# Patient Record
Sex: FEMALE | Race: WHITE | Employment: OTHER | URBAN - METROPOLITAN AREA
[De-identification: names, ages, dates, MRNs, and addresses within clinical notes are randomized per-mention and may not be internally consistent; named-entity substitution may affect disease eponyms.]

---

## 2021-08-30 ENCOUNTER — APPOINTMENT (OUTPATIENT)
Dept: GENERAL RADIOLOGY | Age: 81
End: 2021-08-30
Attending: EMERGENCY MEDICINE
Payer: MEDICARE

## 2021-08-30 ENCOUNTER — HOSPITAL ENCOUNTER (EMERGENCY)
Age: 81
Discharge: HOME | End: 2021-08-30
Attending: EMERGENCY MEDICINE
Payer: MEDICARE

## 2021-08-30 VITALS
TEMPERATURE: 98.3 F | HEIGHT: 63 IN | OXYGEN SATURATION: 94 % | DIASTOLIC BLOOD PRESSURE: 64 MMHG | RESPIRATION RATE: 18 BRPM | WEIGHT: 250 LBS | SYSTOLIC BLOOD PRESSURE: 145 MMHG | BODY MASS INDEX: 44.3 KG/M2 | HEART RATE: 70 BPM

## 2021-08-30 DIAGNOSIS — U07.1 COVID-19: Primary | ICD-10-CM

## 2021-08-30 LAB
ALBUMIN SERPL-MCNC: 3.3 G/DL (ref 3.2–4.6)
ALBUMIN/GLOB SERPL: 1 {RATIO} (ref 1.2–3.5)
ALP SERPL-CCNC: 132 U/L (ref 50–136)
ALT SERPL-CCNC: 21 U/L (ref 12–65)
ANION GAP SERPL CALC-SCNC: 6 MMOL/L (ref 7–16)
AST SERPL-CCNC: 15 U/L (ref 15–37)
BASOPHILS # BLD: 0 K/UL (ref 0–0.2)
BASOPHILS NFR BLD: 1 % (ref 0–2)
BILIRUB SERPL-MCNC: 0.2 MG/DL (ref 0.2–1.1)
BUN SERPL-MCNC: 17 MG/DL (ref 8–23)
CALCIUM SERPL-MCNC: 8.8 MG/DL (ref 8.3–10.4)
CHLORIDE SERPL-SCNC: 109 MMOL/L (ref 98–107)
CO2 SERPL-SCNC: 27 MMOL/L (ref 21–32)
CREAT SERPL-MCNC: 0.65 MG/DL (ref 0.6–1)
DIFFERENTIAL METHOD BLD: ABNORMAL
EOSINOPHIL # BLD: 0.3 K/UL (ref 0–0.8)
EOSINOPHIL NFR BLD: 5 % (ref 0.5–7.8)
ERYTHROCYTE [DISTWIDTH] IN BLOOD BY AUTOMATED COUNT: 15.9 % (ref 11.9–14.6)
GLOBULIN SER CALC-MCNC: 3.3 G/DL (ref 2.3–3.5)
GLUCOSE SERPL-MCNC: 131 MG/DL (ref 65–100)
HCT VFR BLD AUTO: 35.2 % (ref 35.8–46.3)
HGB BLD-MCNC: 10.8 G/DL (ref 11.7–15.4)
IMM GRANULOCYTES # BLD AUTO: 0 K/UL (ref 0–0.5)
IMM GRANULOCYTES NFR BLD AUTO: 0 % (ref 0–5)
LYMPHOCYTES # BLD: 1.6 K/UL (ref 0.5–4.6)
LYMPHOCYTES NFR BLD: 30 % (ref 13–44)
MCH RBC QN AUTO: 25 PG (ref 26.1–32.9)
MCHC RBC AUTO-ENTMCNC: 30.7 G/DL (ref 31.4–35)
MCV RBC AUTO: 81.5 FL (ref 79.6–97.8)
MONOCYTES # BLD: 0.6 K/UL (ref 0.1–1.3)
MONOCYTES NFR BLD: 11 % (ref 4–12)
NEUTS SEG # BLD: 2.8 K/UL (ref 1.7–8.2)
NEUTS SEG NFR BLD: 52 % (ref 43–78)
NRBC # BLD: 0 K/UL (ref 0–0.2)
PLATELET # BLD AUTO: 233 K/UL (ref 150–450)
PMV BLD AUTO: 11.2 FL (ref 9.4–12.3)
POTASSIUM SERPL-SCNC: 3.4 MMOL/L (ref 3.5–5.1)
PROT SERPL-MCNC: 6.6 G/DL (ref 6.3–8.2)
RBC # BLD AUTO: 4.32 M/UL (ref 4.05–5.2)
SODIUM SERPL-SCNC: 142 MMOL/L (ref 136–145)
WBC # BLD AUTO: 5.3 K/UL (ref 4.3–11.1)

## 2021-08-30 PROCEDURE — 74011000636 HC RX REV CODE- 636: Performed by: EMERGENCY MEDICINE

## 2021-08-30 PROCEDURE — 74011250636 HC RX REV CODE- 250/636: Performed by: EMERGENCY MEDICINE

## 2021-08-30 PROCEDURE — 80053 COMPREHEN METABOLIC PANEL: CPT

## 2021-08-30 PROCEDURE — 99283 EMERGENCY DEPT VISIT LOW MDM: CPT

## 2021-08-30 PROCEDURE — 74011000258 HC RX REV CODE- 258: Performed by: EMERGENCY MEDICINE

## 2021-08-30 PROCEDURE — 85025 COMPLETE CBC W/AUTO DIFF WBC: CPT

## 2021-08-30 PROCEDURE — 71046 X-RAY EXAM CHEST 2 VIEWS: CPT

## 2021-08-30 PROCEDURE — M0243 CASIRIVI AND IMDEVI INFUSION: HCPCS

## 2021-08-30 PROCEDURE — 96374 THER/PROPH/DIAG INJ IV PUSH: CPT

## 2021-08-30 RX ORDER — DEXAMETHASONE SODIUM PHOSPHATE 100 MG/10ML
10 INJECTION INTRAMUSCULAR; INTRAVENOUS
Status: COMPLETED | OUTPATIENT
Start: 2021-08-30 | End: 2021-08-30

## 2021-08-30 RX ADMIN — DEXAMETHASONE SODIUM PHOSPHATE 10 MG: 10 INJECTION INTRAMUSCULAR; INTRAVENOUS at 19:19

## 2021-08-30 RX ADMIN — CASIRIVIMAB AND IMDEVIMAB: 600; 600 INJECTION, SOLUTION, CONCENTRATE INTRAVENOUS at 17:26

## 2021-08-30 NOTE — ED NOTES
I have reviewed discharge instructions with the patient. The patient verbalized understanding. Patient left ED via Discharge Method: ambulatory to Home with self. Opportunity for questions and clarification provided. Patient given 0 scripts. To continue your aftercare when you leave the hospital, you may receive an automated call from our care team to check in on how you are doing. This is a free service and part of our promise to provide the best care and service to meet your aftercare needs.  If you have questions, or wish to unsubscribe from this service please call [phone]. Thank you for Choosing our [facility] Emergency Department.

## 2021-08-30 NOTE — ED TRIAGE NOTES
Pt arrives via walker to triage. Pt reports she is covid positive. Pt is here to receive monoclonal antibodies. Pt reports she is vaccinated, moderna. NAD. Masked.

## 2021-11-26 NOTE — ED PROVIDER NOTES
Mask was worn during the entire patient examination. Efren Sy is a 80 y.o. female who presents to the ED with a chief complaint of Covid positive. She tested this morning. She did receive both doses of the Moderna vaccine. Patient states she just started feeling bad and has had some cough and fatigue. She saw her doctor this morning who wanted her to get monoclonal antibodies given her age I feel this is appropriate as well. Patient looks well otherwise states she still hydrating and has had no fever or chills. She does have a history of COPD. No past medical history on file. No past surgical history on file. No family history on file. Social History     Socioeconomic History    Marital status:      Spouse name: Not on file    Number of children: Not on file    Years of education: Not on file    Highest education level: Not on file   Occupational History    Not on file   Tobacco Use    Smoking status: Not on file   Substance and Sexual Activity    Alcohol use: Not on file    Drug use: Not on file    Sexual activity: Not on file   Other Topics Concern    Not on file   Social History Narrative    Not on file     Social Determinants of Health     Financial Resource Strain:     Difficulty of Paying Living Expenses:    Food Insecurity:     Worried About Running Out of Food in the Last Year:     [address] in the Last Year:    Transportation Needs:     Lack of Transportation (Medical):      Lack of Transportation (Non-Medical):    Physical Activity:     Days of Exercise per Week:     Minutes of Exercise per Session:    Stress:     Feeling of Stress :    Social Connections:     Frequency of Communication with Friends and Family:     Frequency of Social Gatherings with Friends and Family:     Attends Yazdanism Services:     Active Member of Clubs or Organizations:     Attends Club or Organization Meetings:     Marital Status:    Intimate Partner Violence:     Fear of Current or Ex-Partner:     Emotionally Abused:     Physically Abused:     Sexually Abused: ALLERGIES: Benzalkonium    Review of Systems   Constitutional: Positive for fatigue. Negative for activity change, chills and diaphoresis. Respiratory: Positive for cough. Negative for apnea, chest tightness, shortness of breath, wheezing and stridor. Cardiovascular: Negative for chest pain and palpitations. Gastrointestinal: Negative for abdominal distention, abdominal pain, diarrhea, nausea and vomiting. Musculoskeletal: Negative for arthralgias, neck pain and neck stiffness. Skin: Negative for color change, pallor and rash. All other systems reviewed and are negative. Vitals:    08/30/21 1402   BP: (!) 162/61   Pulse: 63   Resp: 16   Temp: 98.3 °F (36.8 °C)   SpO2: 92%   Weight: 113.4 kg (250 lb)   Height: 5' 3\" (1.6 m)            Physical Exam  Vitals and nursing note reviewed. Constitutional:       General: She is not in acute distress. Appearance: Normal appearance. She is well-developed. She is not ill-appearing, toxic-appearing or diaphoretic. HENT:      Head: Normocephalic and atraumatic. Nose: Congestion present. Eyes:      General: No scleral icterus. Conjunctiva/sclera: Conjunctivae normal.   Cardiovascular:      Rate and Rhythm: Normal rate and regular rhythm. Pulmonary:      Effort: Pulmonary effort is normal. No respiratory distress. Breath sounds: No stridor. No wheezing, rhonchi or rales. Chest:      Chest wall: No tenderness. Abdominal:      General: Abdomen is flat. Tenderness: There is no abdominal tenderness. There is no guarding or rebound. Hernia: No hernia is present. Musculoskeletal:         General: No swelling, tenderness, deformity or signs of injury. Normal range of motion. Cervical back: Normal range of motion. No rigidity or tenderness. Skin:     General: Skin is warm.       Capillary Refill: Capillary refill takes less than 2 seconds. Coloration: Skin is not jaundiced or pale. Findings: No bruising, erythema or rash. Neurological:      General: No focal deficit present. Mental Status: She is alert and oriented to person, place, and time. Mental status is at baseline. Psychiatric:         Mood and Affect: Mood normal.         Behavior: Behavior normal.         Thought Content: Thought content normal.          MDM  Number of Diagnoses or Management Options  Diagnosis management comments: Patient has normal vitals her chest x-ray is normal labs appear okay she does have positive COVID-19 test from this morning and is 80years old. So we will give her the Regeneron thankfully she is vaccinated. I spoke with her and daughter on the phone and she is comfortable with plan we will teach her how to monitor her home oxygen level with pulse oximetry. Fatmata Martinez MD; 8/30/2021 @4:29 PM Voice dictation software was used during the making of this note. This software is not perfect and grammatical and other typographical errors may be present.   This note has not been proofread for errors.  ===================================================================          Amount and/or Complexity of Data Reviewed  Clinical lab tests: ordered and reviewed (Results for orders placed or performed during the hospital encounter of 08/30/21  -CBC WITH AUTOMATED DIFF       Result                      Value             Ref Range           WBC                         5.3               4.3 - 11.1 K*       RBC                         4.32              4.05 - 5.2 M*       HGB                         10.8 (L)          11.7 - 15.4 *       HCT                         35.2 (L)          35.8 - 46.3 %       MCV                         81.5              79.6 - 97.8 *       MCH                         25.0 (L)          26.1 - 32.9 *       MCHC                        30.7 (L)          31.4 - 35.0 *       RDW 15.9 (H)          11.9 - 14.6 %       PLATELET                    233               150 - 450 K/*       MPV                         11.2              9.4 - 12.3 FL       ABSOLUTE NRBC               0.00              0.0 - 0.2 K/*       DF                          AUTOMATED                             NEUTROPHILS                 52                43 - 78 %           LYMPHOCYTES                 30                13 - 44 %           MONOCYTES                   11                4.0 - 12.0 %        EOSINOPHILS                 5                 0.5 - 7.8 %         BASOPHILS                   1                 0.0 - 2.0 %         IMMATURE GRANULOCYTES       0                 0.0 - 5.0 %         ABS. NEUTROPHILS            2.8               1.7 - 8.2 K/*       ABS. LYMPHOCYTES            1.6               0.5 - 4.6 K/*       ABS. MONOCYTES              0.6               0.1 - 1.3 K/*       ABS. EOSINOPHILS            0.3               0.0 - 0.8 K/*       ABS. BASOPHILS              0.0               0.0 - 0.2 K/*       ABS. IMM.  GRANS.            0.0               0.0 - 0.5 K/*  -METABOLIC PANEL, COMPREHENSIVE       Result                      Value             Ref Range           Sodium                      142               136 - 145 mm*       Potassium                   3.4 (L)           3.5 - 5.1 mm*       Chloride                    109 (H)           98 - 107 mmo*       CO2                         27                21 - 32 mmol*       Anion gap                   6 (L)             7 - 16 mmol/L       Glucose                     131 (H)           65 - 100 mg/*       BUN                         17                8 - 23 MG/DL        Creatinine                  0.65              0.6 - 1.0 MG*       GFR est AA                  >60               >60 ml/min/1*       GFR est non-AA              >60               >60 ml/min/1*       Calcium                     8.8               8.3 - 10.4 M*       Bilirubin, total            0.2               0.2 - 1.1 MG*       ALT (SGPT)                  21                12 - 65 U/L         AST (SGOT)                  15                15 - 37 U/L         Alk. phosphatase            132               50 - 136 U/L        Protein, total              6.6               6.3 - 8.2 g/*       Albumin                     3.3               3.2 - 4.6 g/*       Globulin                    3.3               2.3 - 3.5 g/*       A-G Ratio                   1.0 (L)           1.2 - 3.5     )  Tests in the radiology section of CPT®: ordered and reviewed (XR CHEST PA LAT    Result Date: 8/30/2021  PA AND LATERAL CHEST X-RAY. Clinical Indication: Cough, shortness of breath, Covid 19 positive Comparison: No prior Findings: 2 views of the chest submitted demonstrate cardiomegaly. There is no pleural effusion. The lungs are expanded and clear. There is no pneumothorax. Degenerative arthrosis noted at the right shoulder.      Cardiomegaly, otherwise no acute abnormality.   )           Procedures normal...

## 2022-03-08 ENCOUNTER — APPOINTMENT (OUTPATIENT)
Dept: GENERAL RADIOLOGY | Age: 82
DRG: 291 | End: 2022-03-08
Attending: EMERGENCY MEDICINE
Payer: MEDICARE

## 2022-03-08 ENCOUNTER — HOSPITAL ENCOUNTER (INPATIENT)
Age: 82
LOS: 2 days | Discharge: HOME | DRG: 291 | End: 2022-03-10
Attending: EMERGENCY MEDICINE | Admitting: FAMILY MEDICINE
Payer: MEDICARE

## 2022-03-08 DIAGNOSIS — R53.83 FATIGUE, UNSPECIFIED TYPE: Primary | ICD-10-CM

## 2022-03-08 DIAGNOSIS — I50.32 CHRONIC DIASTOLIC CONGESTIVE HEART FAILURE (HCC): Chronic | ICD-10-CM

## 2022-03-08 DIAGNOSIS — N17.9 ACUTE KIDNEY INJURY (HCC): ICD-10-CM

## 2022-03-08 DIAGNOSIS — I10 ESSENTIAL HYPERTENSION: Chronic | ICD-10-CM

## 2022-03-08 DIAGNOSIS — N17.9 AKI (ACUTE KIDNEY INJURY) (HCC): ICD-10-CM

## 2022-03-08 DIAGNOSIS — R06.02 SOB (SHORTNESS OF BREATH): ICD-10-CM

## 2022-03-08 DIAGNOSIS — E87.6 HYPOKALEMIA: ICD-10-CM

## 2022-03-08 DIAGNOSIS — I89.0 LYMPHEDEMA OF BOTH LOWER EXTREMITIES: ICD-10-CM

## 2022-03-08 LAB
ALBUMIN SERPL-MCNC: 3.7 G/DL (ref 3.2–4.6)
ALBUMIN/GLOB SERPL: 0.9 {RATIO} (ref 1.2–3.5)
ALP SERPL-CCNC: 111 U/L (ref 50–130)
ALT SERPL-CCNC: 29 U/L (ref 12–65)
ANION GAP SERPL CALC-SCNC: 9 MMOL/L (ref 7–16)
AST SERPL-CCNC: 37 U/L (ref 15–37)
BASOPHILS # BLD: 0.1 K/UL (ref 0–0.2)
BASOPHILS NFR BLD: 1 % (ref 0–2)
BILIRUB SERPL-MCNC: 0.4 MG/DL (ref 0.2–1.1)
BNP SERPL-MCNC: 915 PG/ML
BUN SERPL-MCNC: 51 MG/DL (ref 8–23)
CALCIUM SERPL-MCNC: 10 MG/DL (ref 8.3–10.4)
CHLORIDE SERPL-SCNC: 91 MMOL/L (ref 98–107)
CO2 SERPL-SCNC: 34 MMOL/L (ref 21–32)
COVID-19 RAPID TEST, COVR: NOT DETECTED
CREAT SERPL-MCNC: 1.17 MG/DL (ref 0.6–1)
DIFFERENTIAL METHOD BLD: ABNORMAL
EOSINOPHIL # BLD: 0.7 K/UL (ref 0–0.8)
EOSINOPHIL NFR BLD: 8 % (ref 0.5–7.8)
ERYTHROCYTE [DISTWIDTH] IN BLOOD BY AUTOMATED COUNT: 17.3 % (ref 11.9–14.6)
GLOBULIN SER CALC-MCNC: 4.1 G/DL (ref 2.3–3.5)
GLUCOSE SERPL-MCNC: 132 MG/DL (ref 65–100)
HCT VFR BLD AUTO: 37.6 % (ref 35.8–46.3)
HGB BLD-MCNC: 11.7 G/DL (ref 11.7–15.4)
IMM GRANULOCYTES # BLD AUTO: 0 K/UL (ref 0–0.5)
IMM GRANULOCYTES NFR BLD AUTO: 0 % (ref 0–5)
LYMPHOCYTES # BLD: 2.9 K/UL (ref 0.5–4.6)
LYMPHOCYTES NFR BLD: 33 % (ref 13–44)
MAGNESIUM SERPL-MCNC: 2.7 MG/DL (ref 1.8–2.4)
MCH RBC QN AUTO: 24.2 PG (ref 26.1–32.9)
MCHC RBC AUTO-ENTMCNC: 31.1 G/DL (ref 31.4–35)
MCV RBC AUTO: 77.8 FL (ref 79.6–97.8)
MONOCYTES # BLD: 1.1 K/UL (ref 0.1–1.3)
MONOCYTES NFR BLD: 12 % (ref 4–12)
NEUTS SEG # BLD: 4 K/UL (ref 1.7–8.2)
NEUTS SEG NFR BLD: 46 % (ref 43–78)
NRBC # BLD: 0 K/UL (ref 0–0.2)
PLATELET # BLD AUTO: 383 K/UL (ref 150–450)
PMV BLD AUTO: 11 FL (ref 9.4–12.3)
POTASSIUM SERPL-SCNC: 2.8 MMOL/L (ref 3.5–5.1)
PROT SERPL-MCNC: 7.8 G/DL (ref 6.3–8.2)
RBC # BLD AUTO: 4.83 M/UL (ref 4.05–5.2)
SODIUM SERPL-SCNC: 134 MMOL/L (ref 136–145)
SOURCE, COVRS: NORMAL
WBC # BLD AUTO: 8.9 K/UL (ref 4.3–11.1)

## 2022-03-08 PROCEDURE — 74011250636 HC RX REV CODE- 250/636: Performed by: EMERGENCY MEDICINE

## 2022-03-08 PROCEDURE — 65270000029 HC RM PRIVATE

## 2022-03-08 PROCEDURE — 77010033678 HC OXYGEN DAILY

## 2022-03-08 PROCEDURE — 99285 EMERGENCY DEPT VISIT HI MDM: CPT

## 2022-03-08 PROCEDURE — 74011250637 HC RX REV CODE- 250/637: Performed by: EMERGENCY MEDICINE

## 2022-03-08 PROCEDURE — 96374 THER/PROPH/DIAG INJ IV PUSH: CPT

## 2022-03-08 PROCEDURE — 74011250637 HC RX REV CODE- 250/637: Performed by: FAMILY MEDICINE

## 2022-03-08 PROCEDURE — 85025 COMPLETE CBC W/AUTO DIFF WBC: CPT

## 2022-03-08 PROCEDURE — 94762 N-INVAS EAR/PLS OXIMTRY CONT: CPT

## 2022-03-08 PROCEDURE — 74011000250 HC RX REV CODE- 250: Performed by: EMERGENCY MEDICINE

## 2022-03-08 PROCEDURE — 80053 COMPREHEN METABOLIC PANEL: CPT

## 2022-03-08 PROCEDURE — 87635 SARS-COV-2 COVID-19 AMP PRB: CPT

## 2022-03-08 PROCEDURE — 74011250636 HC RX REV CODE- 250/636: Performed by: FAMILY MEDICINE

## 2022-03-08 PROCEDURE — 71045 X-RAY EXAM CHEST 1 VIEW: CPT

## 2022-03-08 PROCEDURE — 83735 ASSAY OF MAGNESIUM: CPT

## 2022-03-08 PROCEDURE — 93005 ELECTROCARDIOGRAM TRACING: CPT | Performed by: EMERGENCY MEDICINE

## 2022-03-08 PROCEDURE — 83880 ASSAY OF NATRIURETIC PEPTIDE: CPT

## 2022-03-08 RX ORDER — POLYETHYLENE GLYCOL 3350 17 G/17G
17 POWDER, FOR SOLUTION ORAL AS NEEDED
COMMUNITY

## 2022-03-08 RX ORDER — SERTRALINE HYDROCHLORIDE 50 MG/1
TABLET, FILM COATED ORAL AS NEEDED
COMMUNITY

## 2022-03-08 RX ORDER — RABEPRAZOLE SODIUM 20 MG/1
20 TABLET, DELAYED RELEASE ORAL DAILY
COMMUNITY

## 2022-03-08 RX ORDER — FLUTICASONE PROPIONATE 50 MCG
2 SPRAY, SUSPENSION (ML) NASAL AS NEEDED
COMMUNITY

## 2022-03-08 RX ORDER — HYDROCODONE BITARTRATE AND ACETAMINOPHEN 10; 325 MG/1; MG/1
1 TABLET ORAL
COMMUNITY
End: 2022-03-15

## 2022-03-08 RX ORDER — POLYETHYLENE GLYCOL 3350 17 G/17G
17 POWDER, FOR SOLUTION ORAL DAILY
Status: DISCONTINUED | OUTPATIENT
Start: 2022-03-09 | End: 2022-03-10 | Stop reason: HOSPADM

## 2022-03-08 RX ORDER — PRAVASTATIN SODIUM 40 MG/1
40 TABLET ORAL
COMMUNITY

## 2022-03-08 RX ORDER — ENOXAPARIN SODIUM 100 MG/ML
40 INJECTION SUBCUTANEOUS EVERY 12 HOURS
Status: DISCONTINUED | OUTPATIENT
Start: 2022-03-08 | End: 2022-03-10 | Stop reason: HOSPADM

## 2022-03-08 RX ORDER — GUAIFENESIN 100 MG/5ML
81 LIQUID (ML) ORAL DAILY
COMMUNITY

## 2022-03-08 RX ORDER — FUROSEMIDE 10 MG/ML
80 INJECTION INTRAMUSCULAR; INTRAVENOUS
Status: COMPLETED | OUTPATIENT
Start: 2022-03-08 | End: 2022-03-08

## 2022-03-08 RX ORDER — POTASSIUM CHLORIDE 20 MEQ/1
40 TABLET, EXTENDED RELEASE ORAL
Status: COMPLETED | OUTPATIENT
Start: 2022-03-08 | End: 2022-03-08

## 2022-03-08 RX ORDER — CELECOXIB 200 MG/1
CAPSULE ORAL 2 TIMES DAILY
COMMUNITY

## 2022-03-08 RX ORDER — ONDANSETRON 2 MG/ML
4 INJECTION INTRAMUSCULAR; INTRAVENOUS
Status: DISCONTINUED | OUTPATIENT
Start: 2022-03-08 | End: 2022-03-10 | Stop reason: HOSPADM

## 2022-03-08 RX ORDER — AMLODIPINE BESYLATE AND ATORVASTATIN CALCIUM 10; 10 MG/1; MG/1
1 TABLET, FILM COATED ORAL DAILY
COMMUNITY
End: 2022-03-10

## 2022-03-08 RX ORDER — SODIUM CHLORIDE 0.9 % (FLUSH) 0.9 %
5-10 SYRINGE (ML) INJECTION EVERY 8 HOURS
Status: DISCONTINUED | OUTPATIENT
Start: 2022-03-08 | End: 2022-03-10 | Stop reason: HOSPADM

## 2022-03-08 RX ORDER — GUAIFENESIN 600 MG/1
600 TABLET, EXTENDED RELEASE ORAL 2 TIMES DAILY
COMMUNITY

## 2022-03-08 RX ORDER — FLUTICASONE PROPIONATE AND SALMETEROL XINAFOATE 115; 21 UG/1; UG/1
2 AEROSOL, METERED RESPIRATORY (INHALATION) 2 TIMES DAILY
COMMUNITY

## 2022-03-08 RX ORDER — CALCIUM CARBONATE 200(500)MG
1 TABLET,CHEWABLE ORAL DAILY
COMMUNITY

## 2022-03-08 RX ORDER — FUROSEMIDE 40 MG/1
TABLET ORAL DAILY
COMMUNITY
End: 2022-03-10

## 2022-03-08 RX ORDER — GUAIFENESIN/DEXTROMETHORPHAN 100-10MG/5
5 SYRUP ORAL
COMMUNITY
End: 2022-03-15

## 2022-03-08 RX ORDER — TRIAMCINOLONE ACETONIDE 1 MG/G
OINTMENT TOPICAL AS NEEDED
COMMUNITY

## 2022-03-08 RX ORDER — SODIUM CHLORIDE 0.9 % (FLUSH) 0.9 %
5-10 SYRINGE (ML) INJECTION AS NEEDED
Status: DISCONTINUED | OUTPATIENT
Start: 2022-03-08 | End: 2022-03-10 | Stop reason: HOSPADM

## 2022-03-08 RX ORDER — ACETAMINOPHEN 325 MG/1
650 TABLET ORAL
Status: DISCONTINUED | OUTPATIENT
Start: 2022-03-08 | End: 2022-03-10 | Stop reason: HOSPADM

## 2022-03-08 RX ORDER — SODIUM CHLORIDE 0.9 % (FLUSH) 0.9 %
5-40 SYRINGE (ML) INJECTION AS NEEDED
Status: DISCONTINUED | OUTPATIENT
Start: 2022-03-08 | End: 2022-03-08

## 2022-03-08 RX ORDER — CLOTRIMAZOLE AND BETAMETHASONE DIPROPIONATE 10; .64 MG/G; MG/G
CREAM TOPICAL AS NEEDED
COMMUNITY

## 2022-03-08 RX ORDER — ALPRAZOLAM 0.5 MG/1
TABLET ORAL AS NEEDED
Status: ON HOLD | COMMUNITY
End: 2022-03-15 | Stop reason: SDUPTHER

## 2022-03-08 RX ORDER — POTASSIUM CHLORIDE 14.9 MG/ML
20 INJECTION INTRAVENOUS
Status: COMPLETED | OUTPATIENT
Start: 2022-03-08 | End: 2022-03-09

## 2022-03-08 RX ORDER — METAXALONE 800 MG/1
TABLET ORAL 2 TIMES DAILY
COMMUNITY

## 2022-03-08 RX ORDER — POTASSIUM CHLORIDE 20 MEQ/1
40 TABLET, EXTENDED RELEASE ORAL 2 TIMES DAILY
Status: COMPLETED | OUTPATIENT
Start: 2022-03-08 | End: 2022-03-10

## 2022-03-08 RX ORDER — ACETAMINOPHEN 650 MG/1
650 SUPPOSITORY RECTAL
Status: DISCONTINUED | OUTPATIENT
Start: 2022-03-08 | End: 2022-03-10 | Stop reason: HOSPADM

## 2022-03-08 RX ORDER — PROMETHAZINE HYDROCHLORIDE 25 MG/1
12.5 TABLET ORAL
Status: DISCONTINUED | OUTPATIENT
Start: 2022-03-08 | End: 2022-03-10 | Stop reason: HOSPADM

## 2022-03-08 RX ORDER — SODIUM CHLORIDE 0.9 % (FLUSH) 0.9 %
5-40 SYRINGE (ML) INJECTION EVERY 8 HOURS
Status: DISCONTINUED | OUTPATIENT
Start: 2022-03-08 | End: 2022-03-08

## 2022-03-08 RX ORDER — CALCIUM CARBONATE/VITAMIN D3 600 MG-125
TABLET ORAL
COMMUNITY

## 2022-03-08 RX ADMIN — POTASSIUM CHLORIDE 40 MEQ: 20 TABLET, EXTENDED RELEASE ORAL at 22:48

## 2022-03-08 RX ADMIN — POTASSIUM CHLORIDE 40 MEQ: 20 TABLET, EXTENDED RELEASE ORAL at 20:32

## 2022-03-08 RX ADMIN — ENOXAPARIN SODIUM 40 MG: 100 INJECTION SUBCUTANEOUS at 22:48

## 2022-03-08 RX ADMIN — POTASSIUM CHLORIDE 20 MEQ: 14.9 INJECTION, SOLUTION INTRAVENOUS at 22:48

## 2022-03-08 RX ADMIN — SODIUM CHLORIDE, PRESERVATIVE FREE 10 ML: 5 INJECTION INTRAVENOUS at 22:00

## 2022-03-08 RX ADMIN — FUROSEMIDE 80 MG: 10 INJECTION, SOLUTION INTRAVENOUS at 18:58

## 2022-03-08 NOTE — ED TRIAGE NOTES
Pt to ER with daughter and states pt has been having swelling in bilateral legs for about one week. States pt has been taking Lasix 80mg in the morning and 40 mg at night but still has the swelling. States pt has not been able to urinate much. States she has been been more weak and SOB for about one week also. Masked for triage.

## 2022-03-08 NOTE — ED PROVIDER NOTES
77-year-old female brought in by daughter. Review of records the patient has a history of reflux, congestive heart failure, hypertension. Patient complains of some troubles with fatigue some shortness of breath diffuse weakness and occasional leg cramping. Patient has a history congestive heart failure and takes 40 mg Lasix a day. This was recently graded as to 80 mg a day by her primary care doctor. She is to have troubles with edema and shortness of breath and last week, patient was added to 40 mg dose of Lasix at night as well. States going to bathroom frequently but little each time. No chest pain or fever chills. Occasional cough. She does have some orthopnea. Has oximeter at home and states O2 saturations been running between 8494% but usually average around 88%. Legs are more swollen compared to a week ago. The history is provided by the patient and a relative. Leg Swelling   This is a new problem. The current episode started more than 1 week ago. The problem has been gradually worsening. The patient is experiencing no pain. Pertinent negatives include no back pain and no neck pain. There has been no history of extremity trauma. No past medical history on file. No past surgical history on file. No family history on file.     Social History     Socioeconomic History    Marital status:      Spouse name: Not on file    Number of children: Not on file    Years of education: Not on file    Highest education level: Not on file   Occupational History    Not on file   Tobacco Use    Smoking status: Not on file    Smokeless tobacco: Not on file   Substance and Sexual Activity    Alcohol use: Not on file    Drug use: Not on file    Sexual activity: Not on file   Other Topics Concern    Not on file   Social History Narrative    Not on file     Social Determinants of Health     Financial Resource Strain:     Difficulty of Paying Living Expenses: Not on file   Food Insecurity:     Worried About Running Out of Food in the Last Year: Not on file    Edilberto of Food in the Last Year: Not on file   Transportation Needs:     Lack of Transportation (Medical): Not on file    Lack of Transportation (Non-Medical): Not on file   Physical Activity:     Days of Exercise per Week: Not on file    Minutes of Exercise per Session: Not on file   Stress:     Feeling of Stress : Not on file   Social Connections:     Frequency of Communication with Friends and Family: Not on file    Frequency of Social Gatherings with Friends and Family: Not on file    Attends Anabaptist Services: Not on file    Active Member of [address] [place] [address] [employer] or Organizations: Not on file    Attends Club or Organization Meetings: Not on file    Marital Status: Not on file   Intimate Partner Violence:     Fear of Current or Ex-Partner: Not on file    Emotionally Abused: Not on file    Physically Abused: Not on file    Sexually Abused: Not on file   Housing Stability:     Unable to Pay for Housing in the Last Year: Not on file    Number of [place] in the Last Year: Not on file    Unstable Housing in the Last Year: Not on file         ALLERGIES: Benzalkonium    Review of Systems   Constitutional: Positive for fatigue. Negative for chills, diaphoresis and fever. Respiratory: Negative for cough and shortness of breath. Cardiovascular: Positive for leg swelling. Negative for chest pain and palpitations. Gastrointestinal: Negative for abdominal pain, diarrhea and vomiting. Genitourinary: Negative for dysuria and flank pain. Musculoskeletal: Negative for back pain and neck pain. Skin: Negative for color change and rash. Neurological: Positive for weakness (Generalized). Negative for syncope, light-headedness and headaches. All other systems reviewed and are negative.       Vitals:    03/08/22 1822 03/08/22 1843   BP: 115/61    Pulse: 64    Resp: 20    Temp: 97.7 °F (36.5 °C)    SpO2: 90% 96%   Weight: 117.9 kg (260 lb)    Height: 5' 4\" (1.626 m)             Physical Exam  Vitals and nursing note reviewed. Constitutional:       General: She is not in acute distress. Appearance: She is well-developed. HENT:      Head: Normocephalic and atraumatic. Right Ear: External ear normal.      Left Ear: External ear normal.      Mouth/Throat:      Pharynx: No oropharyngeal exudate. Eyes:      General: No scleral icterus. Conjunctiva/sclera: Conjunctivae normal.      Pupils: Pupils are equal, round, and reactive to light. Cardiovascular:      Rate and Rhythm: Normal rate and regular rhythm. Heart sounds: No murmur heard. Pulmonary:      Effort: No respiratory distress. Breath sounds: Rales present. Abdominal:      Palpations: Abdomen is soft. Tenderness: There is no abdominal tenderness. There is no guarding. Musculoskeletal:         General: Swelling and tenderness (No erythema no focal tenderness.) present. Cervical back: Normal range of motion and neck supple. Right lower leg: Edema present. Left lower leg: Edema present. Skin:     General: Skin is warm and dry. Neurological:      Mental Status: She is alert and oriented to person, place, and time. Gait: Gait normal.      Comments: Nl speech   Psychiatric:         Speech: Speech normal.          MDM  Number of Diagnoses or Management Options  Acute kidney injury (HCC)  Fatigue, unspecified type  Hypokalemia  SOB (shortness of breath)  Diagnosis management comments: Increasing shortness of breath orthopnea and dyspnea on exertion in a patient with history of congestive failure. Chest x-ray to check for pneumonia, effusion or congestive failure. Screening lab work to check for worse renal function electrolyte abnormalities or anemia. Check EKG. IV Lasix.        Amount and/or Complexity of Data Reviewed  Clinical lab tests: ordered and reviewed  Tests in the radiology section of CPT®: ordered and reviewed  Tests in the medicine section of CPT®: ordered and reviewed  Review and summarize past medical records: yes  Independent visualization of images, tracings, or specimens: yes (My interpretation of EKG shows rhythm at 67. First-degree AV block. Nonspecific ST changes.   Normal QT interval.  No ectopy.)    Risk of Complications, Morbidity, and/or Mortality  Presenting problems: moderate  Diagnostic procedures: minimal  Management options: low    Patient Progress  Patient progress: stable         Procedures

## 2022-03-09 ENCOUNTER — APPOINTMENT (OUTPATIENT)
Dept: NON INVASIVE DIAGNOSTICS | Age: 82
DRG: 291 | End: 2022-03-09
Attending: FAMILY MEDICINE
Payer: MEDICARE

## 2022-03-09 PROBLEM — I50.32 CHRONIC DIASTOLIC CONGESTIVE HEART FAILURE (HCC): Chronic | Status: ACTIVE | Noted: 2021-06-29

## 2022-03-09 PROBLEM — I10 ESSENTIAL HYPERTENSION: Chronic | Status: ACTIVE | Noted: 2022-03-08

## 2022-03-09 LAB
ANION GAP SERPL CALC-SCNC: 9 MMOL/L (ref 7–16)
ATRIAL RATE: 67 BPM
BASOPHILS # BLD: 0.1 K/UL (ref 0–0.2)
BASOPHILS NFR BLD: 1 % (ref 0–2)
BUN SERPL-MCNC: 45 MG/DL (ref 8–23)
CALCIUM SERPL-MCNC: 9.7 MG/DL (ref 8.3–10.4)
CALCULATED P AXIS, ECG09: 55 DEGREES
CALCULATED R AXIS, ECG10: 4 DEGREES
CALCULATED T AXIS, ECG11: 55 DEGREES
CHLORIDE SERPL-SCNC: 95 MMOL/L (ref 98–107)
CO2 SERPL-SCNC: 33 MMOL/L (ref 21–32)
CREAT SERPL-MCNC: 0.94 MG/DL (ref 0.6–1)
DIAGNOSIS, 93000: NORMAL
DIFFERENTIAL METHOD BLD: ABNORMAL
ECHO AO ASC DIAM: 3.3 CM
ECHO AO ASCENDING AORTA INDEX: 1.51 CM/M2
ECHO AO ROOT DIAM: 3.4 CM
ECHO AO ROOT INDEX: 1.55 CM/M2
ECHO AV AREA PEAK VELOCITY: 2.6 CM2
ECHO AV AREA VTI: 2.5 CM2
ECHO AV AREA/BSA PEAK VELOCITY: 1.2 CM2/M2
ECHO AV AREA/BSA VTI: 1.1 CM2/M2
ECHO AV MEAN GRADIENT: 9 MMHG
ECHO AV MEAN VELOCITY: 1.4 M/S
ECHO AV PEAK GRADIENT: 15 MMHG
ECHO AV PEAK VELOCITY: 1.9 M/S
ECHO AV VELOCITY RATIO: 0.95
ECHO AV VTI: 45.9 CM
ECHO EST RA PRESSURE: 3 MMHG
ECHO IVC EXP: 1.7 CM
ECHO LA AREA 2C: 19.3 CM2
ECHO LA AREA 4C: 19.5 CM2
ECHO LA DIAMETER INDEX: 1.51 CM/M2
ECHO LA DIAMETER: 3.3 CM
ECHO LA MAJOR AXIS: 5.5 CM
ECHO LA MINOR AXIS: 5.3 CM
ECHO LA TO AORTIC ROOT RATIO: 0.97
ECHO LA VOL BP: 56 ML (ref 22–52)
ECHO LA VOL/BSA BIPLANE: 26 ML/M2 (ref 16–34)
ECHO LV E' LATERAL VELOCITY: 8 CM/S
ECHO LV E' SEPTAL VELOCITY: 6 CM/S
ECHO LV EDV A2C: 118 ML
ECHO LV EDV A4C: 116 ML
ECHO LV EDV INDEX A4C: 53 ML/M2
ECHO LV EDV NDEX A2C: 54 ML/M2
ECHO LV EJECTION FRACTION A2C: 55 %
ECHO LV EJECTION FRACTION A4C: 51 %
ECHO LV EJECTION FRACTION BIPLANE: 53 % (ref 55–100)
ECHO LV ESV A2C: 54 ML
ECHO LV ESV A4C: 57 ML
ECHO LV ESV INDEX A2C: 25 ML/M2
ECHO LV ESV INDEX A4C: 26 ML/M2
ECHO LV FRACTIONAL SHORTENING: 33 % (ref 28–44)
ECHO LV INTERNAL DIMENSION DIASTOLE INDEX: 1.96 CM/M2
ECHO LV INTERNAL DIMENSION DIASTOLIC: 4.3 CM (ref 3.9–5.3)
ECHO LV INTERNAL DIMENSION SYSTOLIC INDEX: 1.32 CM/M2
ECHO LV INTERNAL DIMENSION SYSTOLIC: 2.9 CM
ECHO LV IVSD: 1.8 CM (ref 0.6–0.9)
ECHO LV MASS 2D: 329.3 G (ref 67–162)
ECHO LV MASS INDEX 2D: 150.4 G/M2 (ref 43–95)
ECHO LV POSTERIOR WALL DIASTOLIC: 1.7 CM (ref 0.6–0.9)
ECHO LV RELATIVE WALL THICKNESS RATIO: 0.79
ECHO LVOT AREA: 2.8 CM2
ECHO LVOT AV VTI INDEX: 0.87
ECHO LVOT DIAM: 1.9 CM
ECHO LVOT MEAN GRADIENT: 6 MMHG
ECHO LVOT PEAK GRADIENT: 13 MMHG
ECHO LVOT PEAK VELOCITY: 1.8 M/S
ECHO LVOT STROKE VOLUME INDEX: 51.6 ML/M2
ECHO LVOT SV: 113.1 ML
ECHO LVOT VTI: 39.9 CM
ECHO MV A VELOCITY: 1.11 M/S
ECHO MV E DECELERATION TIME (DT): 206 MS
ECHO MV E VELOCITY: 0.85 M/S
ECHO MV E/A RATIO: 0.77
ECHO MV E/E' LATERAL: 10.63
ECHO MV E/E' RATIO (AVERAGED): 12.4
ECHO MV E/E' SEPTAL: 14.17
ECHO PV ACCELERATION TIME (AT): 153 MS
ECHO PV MAX VELOCITY: 1.6 M/S
ECHO PV PEAK GRADIENT: 10 MMHG
ECHO RIGHT VENTRICULAR SYSTOLIC PRESSURE (RVSP): 47 MMHG
ECHO RV BASAL DIMENSION: 3.8 CM
ECHO RV INTERNAL DIMENSION: 3.7 CM
ECHO TV REGURGITANT MAX VELOCITY: 3.33 M/S
ECHO TV REGURGITANT PEAK GRADIENT: 44 MMHG
EOSINOPHIL # BLD: 0.5 K/UL (ref 0–0.8)
EOSINOPHIL NFR BLD: 8 % (ref 0.5–7.8)
ERYTHROCYTE [DISTWIDTH] IN BLOOD BY AUTOMATED COUNT: 17.2 % (ref 11.9–14.6)
GLUCOSE SERPL-MCNC: 115 MG/DL (ref 65–100)
HCT VFR BLD AUTO: 32.5 % (ref 35.8–46.3)
HGB BLD-MCNC: 10 G/DL (ref 11.7–15.4)
IMM GRANULOCYTES # BLD AUTO: 0 K/UL (ref 0–0.5)
IMM GRANULOCYTES NFR BLD AUTO: 0 % (ref 0–5)
LYMPHOCYTES # BLD: 2 K/UL (ref 0.5–4.6)
LYMPHOCYTES NFR BLD: 32 % (ref 13–44)
MAGNESIUM SERPL-MCNC: 2.5 MG/DL (ref 1.8–2.4)
MCH RBC QN AUTO: 24 PG (ref 26.1–32.9)
MCHC RBC AUTO-ENTMCNC: 30.8 G/DL (ref 31.4–35)
MCV RBC AUTO: 78.1 FL (ref 79.6–97.8)
MONOCYTES # BLD: 1 K/UL (ref 0.1–1.3)
MONOCYTES NFR BLD: 15 % (ref 4–12)
NEUTS SEG # BLD: 2.8 K/UL (ref 1.7–8.2)
NEUTS SEG NFR BLD: 44 % (ref 43–78)
NRBC # BLD: 0 K/UL (ref 0–0.2)
P-R INTERVAL, ECG05: 216 MS
PLATELET # BLD AUTO: 298 K/UL (ref 150–450)
PMV BLD AUTO: 11.4 FL (ref 9.4–12.3)
POTASSIUM SERPL-SCNC: 3.1 MMOL/L (ref 3.5–5.1)
Q-T INTERVAL, ECG07: 446 MS
QRS DURATION, ECG06: 108 MS
QTC CALCULATION (BEZET), ECG08: 471 MS
RBC # BLD AUTO: 4.16 M/UL (ref 4.05–5.2)
SODIUM SERPL-SCNC: 137 MMOL/L (ref 136–145)
VENTRICULAR RATE, ECG03: 67 BPM
WBC # BLD AUTO: 6.4 K/UL (ref 4.3–11.1)

## 2022-03-09 PROCEDURE — 85025 COMPLETE CBC W/AUTO DIFF WBC: CPT

## 2022-03-09 PROCEDURE — 74011250636 HC RX REV CODE- 250/636: Performed by: FAMILY MEDICINE

## 2022-03-09 PROCEDURE — 77030040361 HC SLV COMPR DVT MDII -B

## 2022-03-09 PROCEDURE — 94640 AIRWAY INHALATION TREATMENT: CPT

## 2022-03-09 PROCEDURE — 74011250637 HC RX REV CODE- 250/637: Performed by: INTERNAL MEDICINE

## 2022-03-09 PROCEDURE — 36415 COLL VENOUS BLD VENIPUNCTURE: CPT

## 2022-03-09 PROCEDURE — 97161 PT EVAL LOW COMPLEX 20 MIN: CPT

## 2022-03-09 PROCEDURE — 97530 THERAPEUTIC ACTIVITIES: CPT

## 2022-03-09 PROCEDURE — 97165 OT EVAL LOW COMPLEX 30 MIN: CPT

## 2022-03-09 PROCEDURE — 65270000029 HC RM PRIVATE

## 2022-03-09 PROCEDURE — 77010033678 HC OXYGEN DAILY

## 2022-03-09 PROCEDURE — 83735 ASSAY OF MAGNESIUM: CPT

## 2022-03-09 PROCEDURE — 93306 TTE W/DOPPLER COMPLETE: CPT

## 2022-03-09 PROCEDURE — 74011250637 HC RX REV CODE- 250/637: Performed by: FAMILY MEDICINE

## 2022-03-09 PROCEDURE — 74011000250 HC RX REV CODE- 250: Performed by: EMERGENCY MEDICINE

## 2022-03-09 PROCEDURE — 97535 SELF CARE MNGMENT TRAINING: CPT

## 2022-03-09 PROCEDURE — 94760 N-INVAS EAR/PLS OXIMETRY 1: CPT

## 2022-03-09 PROCEDURE — 80048 BASIC METABOLIC PNL TOTAL CA: CPT

## 2022-03-09 PROCEDURE — 81003 URINALYSIS AUTO W/O SCOPE: CPT

## 2022-03-09 PROCEDURE — 77030012341 HC CHMB SPCR OPTC MDI VYRM -A

## 2022-03-09 PROCEDURE — 99222 1ST HOSP IP/OBS MODERATE 55: CPT | Performed by: INTERNAL MEDICINE

## 2022-03-09 RX ORDER — FUROSEMIDE 40 MG/1
40 TABLET ORAL DAILY
Status: DISCONTINUED | OUTPATIENT
Start: 2022-03-09 | End: 2022-03-09

## 2022-03-09 RX ORDER — AMLODIPINE BESYLATE 10 MG/1
10 TABLET ORAL DAILY
Status: DISCONTINUED | OUTPATIENT
Start: 2022-03-09 | End: 2022-03-10 | Stop reason: HOSPADM

## 2022-03-09 RX ORDER — PANTOPRAZOLE SODIUM 40 MG/1
40 TABLET, DELAYED RELEASE ORAL
Status: DISCONTINUED | OUTPATIENT
Start: 2022-03-09 | End: 2022-03-10 | Stop reason: HOSPADM

## 2022-03-09 RX ORDER — SERTRALINE HYDROCHLORIDE 50 MG/1
50 TABLET, FILM COATED ORAL DAILY
Status: DISCONTINUED | OUTPATIENT
Start: 2022-03-09 | End: 2022-03-10 | Stop reason: HOSPADM

## 2022-03-09 RX ORDER — PRAVASTATIN SODIUM 20 MG/1
40 TABLET ORAL
Status: DISCONTINUED | OUTPATIENT
Start: 2022-03-09 | End: 2022-03-10 | Stop reason: HOSPADM

## 2022-03-09 RX ORDER — GUAIFENESIN 100 MG/5ML
81 LIQUID (ML) ORAL DAILY
Status: DISCONTINUED | OUTPATIENT
Start: 2022-03-09 | End: 2022-03-10 | Stop reason: HOSPADM

## 2022-03-09 RX ORDER — BUMETANIDE 1 MG/1
1 TABLET ORAL DAILY
Status: DISCONTINUED | OUTPATIENT
Start: 2022-03-09 | End: 2022-03-10 | Stop reason: HOSPADM

## 2022-03-09 RX ORDER — GUAIFENESIN 600 MG/1
600 TABLET, EXTENDED RELEASE ORAL EVERY 12 HOURS
Status: DISCONTINUED | OUTPATIENT
Start: 2022-03-09 | End: 2022-03-10 | Stop reason: HOSPADM

## 2022-03-09 RX ORDER — POTASSIUM CHLORIDE 20 MEQ/1
40 TABLET, EXTENDED RELEASE ORAL ONCE
Status: COMPLETED | OUTPATIENT
Start: 2022-03-09 | End: 2022-03-09

## 2022-03-09 RX ORDER — BUDESONIDE AND FORMOTEROL FUMARATE DIHYDRATE 160; 4.5 UG/1; UG/1
2 AEROSOL RESPIRATORY (INHALATION)
Status: DISCONTINUED | OUTPATIENT
Start: 2022-03-09 | End: 2022-03-10 | Stop reason: HOSPADM

## 2022-03-09 RX ORDER — CALCIUM CARBONATE 200(500)MG
200 TABLET,CHEWABLE ORAL DAILY
Status: DISCONTINUED | OUTPATIENT
Start: 2022-03-09 | End: 2022-03-10 | Stop reason: HOSPADM

## 2022-03-09 RX ORDER — ALPRAZOLAM 0.5 MG/1
0.5 TABLET ORAL
Status: DISCONTINUED | OUTPATIENT
Start: 2022-03-09 | End: 2022-03-10 | Stop reason: HOSPADM

## 2022-03-09 RX ADMIN — PRAVASTATIN SODIUM 40 MG: 20 TABLET ORAL at 20:36

## 2022-03-09 RX ADMIN — POLYETHYLENE GLYCOL 3350 17 G: 17 POWDER, FOR SOLUTION ORAL at 08:57

## 2022-03-09 RX ADMIN — POTASSIUM CHLORIDE 20 MEQ: 14.9 INJECTION, SOLUTION INTRAVENOUS at 00:00

## 2022-03-09 RX ADMIN — ANTACID TABLETS 200 MG: 500 TABLET, CHEWABLE ORAL at 08:56

## 2022-03-09 RX ADMIN — GUAIFENESIN 600 MG: 600 TABLET, EXTENDED RELEASE ORAL at 08:56

## 2022-03-09 RX ADMIN — AMLODIPINE BESYLATE 10 MG: 10 TABLET ORAL at 08:57

## 2022-03-09 RX ADMIN — ASPIRIN 81 MG: 81 TABLET, CHEWABLE ORAL at 08:56

## 2022-03-09 RX ADMIN — BUMETANIDE 1 MG: 1 TABLET ORAL at 08:56

## 2022-03-09 RX ADMIN — SODIUM CHLORIDE, PRESERVATIVE FREE 10 ML: 5 INJECTION INTRAVENOUS at 06:00

## 2022-03-09 RX ADMIN — PRAVASTATIN SODIUM 40 MG: 20 TABLET ORAL at 22:00

## 2022-03-09 RX ADMIN — GUAIFENESIN 600 MG: 600 TABLET, EXTENDED RELEASE ORAL at 20:36

## 2022-03-09 RX ADMIN — POTASSIUM CHLORIDE 40 MEQ: 20 TABLET, EXTENDED RELEASE ORAL at 08:54

## 2022-03-09 RX ADMIN — POTASSIUM CHLORIDE 40 MEQ: 20 TABLET, EXTENDED RELEASE ORAL at 17:58

## 2022-03-09 RX ADMIN — PANTOPRAZOLE SODIUM 40 MG: 40 TABLET, DELAYED RELEASE ORAL at 06:39

## 2022-03-09 RX ADMIN — ENOXAPARIN SODIUM 40 MG: 100 INJECTION SUBCUTANEOUS at 08:58

## 2022-03-09 RX ADMIN — ENOXAPARIN SODIUM 40 MG: 100 INJECTION SUBCUTANEOUS at 20:36

## 2022-03-09 RX ADMIN — SODIUM CHLORIDE, PRESERVATIVE FREE 10 ML: 5 INJECTION INTRAVENOUS at 14:40

## 2022-03-09 RX ADMIN — SERTRALINE 50 MG: 50 TABLET, FILM COATED ORAL at 08:56

## 2022-03-09 RX ADMIN — POTASSIUM CHLORIDE 40 MEQ: 20 TABLET, EXTENDED RELEASE ORAL at 08:55

## 2022-03-09 RX ADMIN — SODIUM CHLORIDE, PRESERVATIVE FREE 10 ML: 5 INJECTION INTRAVENOUS at 22:00

## 2022-03-09 RX ADMIN — BUDESONIDE AND FORMOTEROL FUMARATE DIHYDRATE 2 PUFF: 160; 4.5 AEROSOL RESPIRATORY (INHALATION) at 21:35

## 2022-03-09 RX ADMIN — BUDESONIDE AND FORMOTEROL FUMARATE DIHYDRATE 2 PUFF: 160; 4.5 AEROSOL RESPIRATORY (INHALATION) at 08:22

## 2022-03-09 NOTE — ED NOTES
Ambulating O2 sat of 93-94%. Brief drop to 88% upon sitting back down on the bed, but back up to 92% on room air after a few seconds.

## 2022-03-09 NOTE — PROGRESS NOTES
Oxygen Qualifier       Room air: SpO2 with O2 and liter flow   Resting SpO2  88%  96% on 3L   Ambulating SpO2  83%  95 on 3l     Pt. Taken to bathroom with walker and back to chair without any complications.   Completed by:    Steve Nova

## 2022-03-09 NOTE — PROGRESS NOTES
Progress Note    Patient: Madina Canela MRN: [id number]  SSN: [id number]    YOB: 1940  Age: 80 y.o. Sex: female      Admit Date: 3/8/2022    LOS: 1 day     Assessment and Plan:   59-year-old female with past medical history of hypertension, hyperlipidemia, GERD, asthma, COPD, CAREN, heart failure with preserved ejection fraction, presented in the setting of worsening bilateral lower extremity swelling associated with shortness of breath    1. Acute hypoxic respiratory failure likely in the setting of acute decompensated heart failure with preserved ejection fraction  -Oxygen therapy to maintain oxygen saturation more than 92%  -Discontinue Lasix  -Start Bumex  -Strict input and output  -Daily weight  -Echocardiogram pending  -Cardiology consulted    2. Acute kidney injury likely prerenal  -Avoid nephrotoxic agents  -Monitor renal function    3. Hypokalemia  -Replete  -Monitor daily    4. Hypertension  -Continue amlodipine    5. Hyperlipidemia  -Continue statin    6. GERD  -Continue PPI    7. Asthma/COPD  -Currently not on exacerbation  -Continue home inhalers  -Albuterol as needed    DVT prophylaxis with Lovenox        Subjective:   59-year-old female with past medical history of hypertension, hyperlipidemia, GERD, asthma, COPD, CAREN, heart failure with preserved ejection fraction, presented in the setting of worsening bilateral lower extremity swelling associated with shortness of breath. Patient seen and examined at bedside. This morning still having lower extremity swelling and mild shortness of breath. Otherwise denies any chest pain, no abdominal pain, no nausea vomiting or diarrhea.     Objective:     Vitals:    03/09/22 0346 03/09/22 0756 03/09/22 0829 03/09/22 1152   BP: (!) 128/53 (!) 108/50  (!) 116/51   Pulse: 65 71  71   Resp: 19 20  20   Temp: 99.1 °F (37.3 °C) 98.7 °F (37.1 °C)  97.7 °F (36.5 °C)   SpO2: 92% 95% 96% 97%   Weight:       Height:            Intake and Output:  Current Shift: 03/09 0701 - 03/09 1900  In: 240 [P.O.:240]  Out: -   Last three shifts: 03/07 1901 - 03/09 0700  In: -   Out: 500 [Urine:500]    ROS  10 ROS negative except from stated on subjective    Physical Exam:   General: Alert, oriented, NAD  HEENT: NC/AT, EOM are intact  Neck: supple, no JVD  Cardiovascular: RRR, S1, S2, no murmurs  Respiratory: Lungs are clear, no wheezes or rales  Abdomen: Soft, NT, ND  Back: No CVA tenderness, no paraspinal tenderness  Extremities: Lateral lower extremity edema, mild erythema  Neuro: A&O, CN are intact, no focal deficits  Skin: no rash or ulcers  Psych: good mood and affect    Lab/Data Review:  I have personally reviewed patients laboratory data showing  Recent Results (from the past 24 hour(s))   EKG, 12 LEAD, INITIAL    Collection Time: 03/08/22  6:42 PM   Result Value Ref Range    Ventricular Rate 67 BPM    Atrial Rate 67 BPM    P-R Interval 216 ms    QRS Duration 108 ms    Q-T Interval 446 ms    QTC Calculation (Bezet) 471 ms    Calculated P Axis 55 degrees    Calculated R Axis 4 degrees    Calculated T Axis 55 degrees    Diagnosis         Sinus rhythm with 1st degree A-V block  Minimal voltage criteria for LVH, may be normal variant  Nonspecific ST and T wave abnormality  Prolonged QT  Abnormal ECG  No previous ECGs available  Confirmed by Yvonne Pineda ([id number]) on 3/9/2022 6:53:15 AM     CBC WITH AUTOMATED DIFF    Collection Time: 03/08/22  6:46 PM   Result Value Ref Range    WBC 8.9 4.3 - 11.1 K/uL    RBC 4.83 4.05 - 5.2 M/uL    HGB 11.7 11.7 - 15.4 g/dL    HCT 37.6 35.8 - 46.3 %    MCV 77.8 (L) 79.6 - 97.8 FL    MCH 24.2 (L) 26.1 - 32.9 PG    MCHC 31.1 (L) 31.4 - 35.0 g/dL    RDW 17.3 (H) 11.9 - 14.6 %    PLATELET 769 943 - 745 K/uL    MPV 11.0 9.4 - 12.3 FL    ABSOLUTE NRBC 0.00 0.0 - 0.2 K/uL    NEUTROPHILS 46 43 - 78 %    LYMPHOCYTES 33 13 - 44 %    MONOCYTES 12 4.0 - 12.0 %    EOSINOPHILS 8 (H) 0.5 - 7.8 %    BASOPHILS 1 0.0 - 2.0 %    IMMATURE GRANULOCYTES 0 0.0 - 5.0 %    ABS. NEUTROPHILS 4.0 1.7 - 8.2 K/UL    ABS. LYMPHOCYTES 2.9 0.5 - 4.6 K/UL    ABS. MONOCYTES 1.1 0.1 - 1.3 K/UL    ABS. EOSINOPHILS 0.7 0.0 - 0.8 K/UL    ABS. BASOPHILS 0.1 0.0 - 0.2 K/UL    ABS. IMM. GRANS. 0.0 0.0 - 0.5 K/UL    DF AUTOMATED     METABOLIC PANEL, COMPREHENSIVE    Collection Time: 03/08/22  6:46 PM   Result Value Ref Range    Sodium 134 (L) 136 - 145 mmol/L    Potassium 2.8 (L) 3.5 - 5.1 mmol/L    Chloride 91 (L) 98 - 107 mmol/L    CO2 34 (H) 21 - 32 mmol/L    Anion gap 9 7 - 16 mmol/L    Glucose 132 (H) 65 - 100 mg/dL    BUN 51 (H) 8 - 23 MG/DL    Creatinine 1.17 (H) 0.6 - 1.0 MG/DL    GFR est AA 57 (L) >60 ml/min/1.73m2    GFR est non-AA 47 (L) >60 ml/min/1.73m2    Calcium 10.0 8.3 - 10.4 MG/DL    Bilirubin, total 0.4 0.2 - 1.1 MG/DL    ALT (SGPT) 29 12 - 65 U/L    AST (SGOT) 37 15 - 37 U/L    Alk.  phosphatase 111 50 - 130 U/L    Protein, total 7.8 6.3 - 8.2 g/dL    Albumin 3.7 3.2 - 4.6 g/dL    Globulin 4.1 (H) 2.3 - 3.5 g/dL    A-G Ratio 0.9 (L) 1.2 - 3.5     MAGNESIUM    Collection Time: 03/08/22  6:46 PM   Result Value Ref Range    Magnesium 2.7 (H) 1.8 - 2.4 mg/dL   NT-PRO BNP    Collection Time: 03/08/22  6:46 PM   Result Value Ref Range    NT pro- (H) <450 PG/ML   COVID-19 RAPID TEST    Collection Time: 03/08/22  8:07 PM   Result Value Ref Range    Specimen source NASAL SWAB      COVID-19 rapid test Not detected NOTD     METABOLIC PANEL, BASIC    Collection Time: 03/09/22  5:56 AM   Result Value Ref Range    Sodium 137 136 - 145 mmol/L    Potassium 3.1 (L) 3.5 - 5.1 mmol/L    Chloride 95 (L) 98 - 107 mmol/L    CO2 33 (H) 21 - 32 mmol/L    Anion gap 9 7 - 16 mmol/L    Glucose 115 (H) 65 - 100 mg/dL    BUN 45 (H) 8 - 23 MG/DL    Creatinine 0.94 0.6 - 1.0 MG/DL    GFR est AA >60 >60 ml/min/1.73m2    GFR est non-AA >60 >60 ml/min/1.73m2    Calcium 9.7 8.3 - 10.4 MG/DL   MAGNESIUM    Collection Time: 03/09/22  5:56 AM   Result Value Ref Range    Magnesium 2.5 (H) 1.8 - 2.4 mg/dL   CBC WITH AUTOMATED DIFF    Collection Time: 03/09/22  5:56 AM   Result Value Ref Range    WBC 6.4 4.3 - 11.1 K/uL    RBC 4.16 4.05 - 5.2 M/uL    HGB 10.0 (L) 11.7 - 15.4 g/dL    HCT 32.5 (L) 35.8 - 46.3 %    MCV 78.1 (L) 79.6 - 97.8 FL    MCH 24.0 (L) 26.1 - 32.9 PG    MCHC 30.8 (L) 31.4 - 35.0 g/dL    RDW 17.2 (H) 11.9 - 14.6 %    PLATELET 648 389 - 257 K/uL    MPV 11.4 9.4 - 12.3 FL    ABSOLUTE NRBC 0.00 0.0 - 0.2 K/uL    DF AUTOMATED      NEUTROPHILS 44 43 - 78 %    LYMPHOCYTES 32 13 - 44 %    MONOCYTES 15 (H) 4.0 - 12.0 %    EOSINOPHILS 8 (H) 0.5 - 7.8 %    BASOPHILS 1 0.0 - 2.0 %    IMMATURE GRANULOCYTES 0 0.0 - 5.0 %    ABS. NEUTROPHILS 2.8 1.7 - 8.2 K/UL    ABS. LYMPHOCYTES 2.0 0.5 - 4.6 K/UL    ABS. MONOCYTES 1.0 0.1 - 1.3 K/UL    ABS. EOSINOPHILS 0.5 0.0 - 0.8 K/UL    ABS. BASOPHILS 0.1 0.0 - 0.2 K/UL    ABS. IMM.  GRANS. 0.0 0.0 - 0.5 K/UL   ECHO ADULT COMPLETE    Collection Time: 03/09/22 12:01 PM   Result Value Ref Range    LV EDV A2C 118 mL    LV EDV A4C 116 mL    LV ESV A2C 54 mL    LV ESV A4C 57 mL    IVSd 1.8 (A) 0.6 - 0.9 cm    LVIDd 4.3 3.9 - 5.3 cm    LVIDs 2.9 cm    LVOT Diameter 1.9 cm    LVOT Mean Gradient 6 mmHg    LVOT VTI 39.9 cm    LVOT Peak Velocity 1.8 m/s    LVOT Peak Gradient 13 mmHg    LVPWd 1.7 (A) 0.6 - 0.9 cm    LV E' Lateral Velocity 8 cm/s    LV E' Septal Velocity 6 cm/s    LV Ejection Fraction A2C 55 %    LV Ejection Fraction A4C 51 %    EF BP 53 (A) 55 - 100 %    LVOT Area 2.8 cm2    LVOT .1 ml    LA Minor Axis 5.3 cm    LA Major [place] 5.5 cm    LA Area 2C 19.3 cm2    LA Area 4C 19.5 cm2    LA Volume BP 56 (A) 22 - 52 mL    LA Diameter 3.3 cm    AV Mean Gradient 9 mmHg    AV VTI 45.9 cm    AV Mean Velocity 1.4 m/s    AV Peak Velocity 1.9 m/s    AV Peak Gradient 15 mmHg    AV Area by VTI 2.5 cm2    AV Area by Peak Velocity 2.6 cm2    Aortic Root 3.4 cm    Ascending Aorta 3.3 cm    IVC Expiration 1.7 cm    MV E Wave Deceleration Time 206.0 ms    MV A Velocity 1.11 m/s    MV E Velocity 0.85 m/s    PV .0 ms    PV Max Velocity 1.6 m/s    PV Peak Gradient 10 mmHg    RVIDd 3.7 cm    RV Basal Dimension 3.8 cm    TR Max Velocity 3.33 m/s    TR Peak Gradient 44 mmHg    Fractional Shortening 2D 33 28 - 44 %    LV ESV Index A4C 26 mL/m2    LV EDV Index A4C 53 mL/m2    LV ESV Index A2C 25 mL/m2    LV EDV Index A2C 54 mL/m2    LVIDd Index 1.96 cm/m2    LVIDs Index 1.32 cm/m2    LV RWT Ratio 0.79     LV Mass 2D 329.3 (A) 67 - 162 g    LV Mass 2D Index 150.4 (A) 43 - 95 g/m2    MV E/A 0.77     E/E' Ratio (Averaged) 12.40     E/E' Lateral 10.63     E/E' Septal 14.17     LA Volume Index BP 26 16 - 34 ml/m2    LVOT Stroke Volume Index 51.6 mL/m2    LA Size Index 1.51 cm/m2    LA/AO Root Ratio 0.97     Ao Root Index 1.55 cm/m2    Ascending Aorta Index 1.51 cm/m2    AV Velocity Ratio 0.95     LVOT:AV VTI Index 0.87     LUCERO/BSA VTI 1.1 cm2/m2    LUCERO/BSA Peak Velocity 1.2 cm2/m2    Est. RA Pressure 3 mmHg    RVSP 47 mmHg      All Micro Results     Procedure Component Value Units Date/Time    COVID-19 RAPID TEST [[id number]] Collected: 03/08/22 2007    Order Status: Completed Specimen: Nasopharyngeal Updated: 03/08/22 2032     Specimen source NASAL SWAB        COVID-19 rapid test Not detected        Comment:      The specimen is NEGATIVE for SARS-CoV-2, the novel coronavirus associated with COVID-19. A negative result does not rule out COVID-19. This test has been authorized by the FDA under an Emergency Use Authorization (EUA) for use by authorized laboratories.         Fact sheet for Healthcare Providers: ConventionUpdate.co.nz  Fact sheet for Patients: ConventionUpdate.co.nz       Methodology: Isothermal Nucleic Acid Amplification                Image:  I have personally reviewed patients imaging showing  XR CHEST PORT   Final Result           Hospital problems     Patient Active Problem List   Diagnosis Code    JOHN (acute kidney injury) ([facility] [address] N17.9    Chronic diastolic congestive heart failure (HCC) I50.32    Essential hypertension I10    Lymphedema of both lower extremities I89.0        I have reviewed, updated, and verified this note's content and spent 38 minutes of my 42 minutes visit performing counseling and coordination of care regarding medical management.        Signed By: Shalini Watts MD     March 9, 2022

## 2022-03-09 NOTE — H&P
[facility] CARDIOLOGY History &Physical                   Subjective:     HPI:  Tulio Rob is a 80 y.o. female with PMH of chronic diastolic heart failure, lower extremity lymphedema, HTN, HLD, fibromuscular dysplasia of the arteries, bilateral renal artery stenosis s/p bilateral artery angioplasty 2/28/2013, DVT in 2013, GERD, Asthma, COPD and CAREN on CPAP who presented to [facility] ED on 3/8 with complaint of bilateral lower extremity swelling, SOB, and decreased urine output. Patient reports that symptoms have been going on for few weeks, but over the last 7 to 10 days have been getting acutely worse. Main symptom that she noticed with shortness of breath as well as lower extremity swelling. Denies PND orthopnea. Had been on Lasix at home and been on increasing doses, however, states that despite increasing dose of Lasix she did not have improvement in her lower extremity swelling. For this reason she presented to the emergency room [address]  On arrival found to have elevated serum creatinine 1.17, reduced potassium level 2.8, she was given Lasix and potassium replacement. Has been admitted to the internal medicine service. Since arrival she states that her symptoms have improved. No clear aggravating factor for her presentation. Has improved with inpatient treatment. Denies chest pain, chest pressure, racing heart, syncope, near syncope, dizziness, lightheadedness. Reports good urine output since arrival to the hospital and treatment with IV diuretics.     Lab Results   Component Value Date     03/09/2022    K 3.1 (L) 03/09/2022    MG 2.5 (H) 03/09/2022    BUN 45 (H) 03/09/2022    CREA 0.94 03/09/2022    WBC 6.4 03/09/2022    HGB 10.0 (L) 03/09/2022     03/09/2022        Past Medical History:   Diagnosis Date    COPD (chronic obstructive pulmonary disease) (HCC)     Diastolic heart failure (HCC)     DVT (deep vein thrombosis) in pregnancy     Fibromuscular dysplasia of bilateral renal arteries (HCC)     GERD (gastroesophageal reflux disease)     HLD (hyperlipidemia)     HTN (hypertension)     CAREN on CPAP     Osteoarthritis     Renal artery stenosis (HCC)     s/p bilateral angioplasty 2/28/2013      History reviewed. No pertinent surgical history. Family History : HTN      History reviewed. No pertinent family history. Social History     Tobacco Use    Smoking status: Not on file    Smokeless tobacco: Not on file   Substance Use Topics    Alcohol use: Not on file      Allergies   Allergen Reactions    Benzalkonium Other (comments)     Caused pink eye         Review of Systems   Constitutional: Negative for chills and fever. HENT: Positive for congestion. Cardiovascular: Positive for dyspnea on exertion and leg swelling. Negative for chest pain, irregular heartbeat and palpitations. Respiratory: Positive for cough and shortness of breath. Hematologic/Lymphatic: Negative. Musculoskeletal: Positive for joint pain. Gastrointestinal: Negative. Neurological: Negative. All other systems reviewed and are negative. Objective:       Visit Vitals  BP (!) 116/51 (BP 1 Location: Right upper arm, BP Patient Position: At rest)   Pulse 71   Temp 97.7 °F (36.5 °C)   Resp 20   Ht 5' 4\" (1.626 m)   Wt 260 lb (117.9 kg)   SpO2 97%   BMI 44.63 kg/m²       03/09 0701 - 03/09 1900  In: 240 [P.O.:240]  Out: -   03/07 1901 - 03/09 0700  In: -   Out: 500 [Urine:500]    Physical Exam  Vitals reviewed. Constitutional:       Appearance: She is well-developed. She is obese. She is not ill-appearing, toxic-appearing or diaphoretic. HENT:      Head: Normocephalic and atraumatic. Nose: Nose normal.   Eyes:      General: No scleral icterus. Right eye: No discharge. Left eye: No discharge. Conjunctiva/sclera: Conjunctivae normal.   Neck:      Vascular: No JVD. Trachea: No tracheal deviation.    Cardiovascular:      Rate and Rhythm: Normal rate and regular rhythm. Pulses:           Radial pulses are 2+ on the right side and 2+ on the left side. Heart sounds: Normal heart sounds, S1 normal and S2 normal. No murmur heard. No gallop. Pulmonary:      Effort: Pulmonary effort is normal. No respiratory distress. Breath sounds: No stridor. Rales (mild bases) present. No wheezing or rhonchi. Abdominal:      General: Bowel sounds are normal. There is no distension. Palpations: Abdomen is soft. Tenderness: There is no abdominal tenderness. Musculoskeletal:         General: Normal range of motion. Cervical back: Normal range of motion. Right lower leg: Edema (legs) present. Left lower leg: Edema (legs) present. Skin:     General: Skin is warm and dry. Neurological:      Mental Status: She is alert and oriented to person, place, and time. Psychiatric:         Mood and Affect: Mood normal.         Speech: Speech normal.         Behavior: Behavior normal.         Cognition and Memory: Cognition normal.       Data Review:   Recent Labs     03/09/22  0556 03/08/22  1846    134*   K 3.1* 2.8*   MG 2.5* 2.7*   BUN 45* 51*   CREA 0.94 1.17*   * 132*   WBC 6.4 8.9   HGB 10.0* 11.7   HCT 32.5* 37.6    383         Echo Results  (Last 48 hours)    None          CXR Results  (Last 48 hours)               03/08/22 1851  XR CHEST PORT Final result    Narrative:  History: Swelling in the legs, one-week duration. Exam: portable chest       Comparison: 8/30/2021       Findings: The lungs are clear. The mediastinal contour and osseous structures   are normal.       IMPRESSIONs: No acute findings. Echocardiogram 3/9/2022:    Left Ventricle Left ventricle size is normal. Normal wall thickness. Normal wall motion. Normal left ventricular systolic function with a visually estimated EF of 50 - 65%. Abnormal diastolic function. Left Atrium Left atrium is mildly dilated. Right Ventricle Right ventricle size is normal. Normal systolic function. Right Atrium Right atrium size is normal.   Aortic Valve Valve structure is normal. No transvalvular regurgitation. No stenosis. Mitral Valve Valve structure is normal. Physiologically normal transvalvular regurgitation. No stenosis noted. Tricuspid Valve Valve structure is normal. Physiologically normal transvalvular regurgitation. No stenosis noted. Pulmonic Valve Valve structure is normal. Physiologically normal transvalvular regurgitation. No stenosis noted. Aorta Normal sized ascending aorta. Pericardium No pericardial effusion. Assessment/Plan:       JOHN (acute kidney injury) ([address] (3/8/2022)   - Cr 1.17 on arrival  - improved with diuresis, closely monitor while inpatient  - per primary     Volume overload  HFpEF   -Reasonable to continue p.o. Bumex for volume control, hemodynamically stable.  -Echocardiogram as above normal LVEF, abnormal diastolic function. - strict I&O's  - daily weights   -Creatinine improved with diuresis, monitor closely. Correct electrolytes as needed. Essential hypertension (3/8/2022)  - stable   - continue amlodipine,       Hypokalemia  -Monitor closely, replace as needed to normal levels. -If hypokalemia remains a problem, consider addition of spironolactone to the patient's medical regimen. CAREN on CPAP  - per primary     Acute hypoxic respiratory failure  -Wean oxygen as tolerated, on diuretics. Patient on p.o. therapy, improving clinically. Please have her follow-up with Jose Guillen MD who is her regular cardiologist at [address] [place], [place] [address]e E 1 to 2 weeks after discharge. We will be on standby. Please call with questions.

## 2022-03-09 NOTE — PROGRESS NOTES
Problem: Self Care Deficits Care Plan (Adult)  Goal: *Acute Goals and Plan of Care (Insert Text)  Outcome: Progressing Towards Goal  Note: 1. Patient will perform grooming with supervision. 2. Patient will perform Upper body dressing with supervision  3. Patient will perform lower body dressing with supervision  4. Patient will perform upper and lower body bathing with supervision. 5. Patient will perform toilet transfers with CGA. 6. Patient will perform shower transfer with CGA. 7. Patient will participate in 30 + minutes of ADL/ therapeutic exercise/therapeutic activity with min rest breaks to increase activity tolerance for self care. 8. Patient will perform ADL functional mobility in room with CGA. Goals to be achieved in 7 days. OCCUPATIONAL THERAPY: Initial Assessment, Daily Note, and AM 3/9/2022  INPATIENT:    [place] / [place] / Product Type: Medicare /      NAME/AGE/GENDER: Ashia Valenzuela is a 80 y.o. female   PRIMARY DIAGNOSIS:  JOHN (acute kidney injury) ([facility] [address] [N17.9] <principal problem not specified> <principal problem not specified>        ICD-10: Treatment Diagnosis:    Generalized Muscle Weakness (M62.81)  Difficulty in walking, Not elsewhere classified (R26.2)   Precautions/Allergies:     Benzalkonium      ASSESSMENT:     Ms. Mayte Barton admitted with above diagnosis. Pt presents with decreased self care, functional mobility, endurance and strength. Pt would benefit from skilled OT to increase independence. Pt ambulated to bathroom with min assist. Pt completed toileting, hygiene and washing hands at sink with setup. Pt ambulated back to recliner and left with needs in reach.      This section established at most recent assessment   PROBLEM LIST (Impairments causing functional limitations):  Decreased Strength  Decreased ADL/Functional Activities  Decreased Transfer Abilities  Decreased Ambulation Ability/Technique  Decreased Balance  Decreased Activity Tolerance   INTERVENTIONS PLANNED: (Benefits and precautions of occupational therapy have been discussed with the patient.)  Activities of daily living training  Adaptive equipment training  Balance training  Clothing management  Therapeutic activity  Therapeutic exercise     TREATMENT PLAN: Frequency/Duration: Follow patient 2x/week to address above goals. Rehabilitation Potential For Stated Goals: Good     REHAB RECOMMENDATIONS (at time of discharge pending progress):    Placement: It is my opinion, based on this patient's performance to date, that Ms. Mana Blanchard may benefit from participating in 1-2 additional therapy sessions in order to continue to assess for rehab potential and then make recommendation for disposition at discharge. Equipment:   None at this time              OCCUPATIONAL PROFILE AND HISTORY:   History of Present Injury/Illness (Reason for Referral): Fanny Genao is a 80 y.o. female with medical history of CHF with lower extremity edema, and hypertension, who presented with worsening edema. Patient comes in with her daughter and they report that she has been having increasing swelling for at least a week or so. Her Lasix has been increased to Lasix 80 mg in the morning and 40 mg at night, but still has swelling. She reports feeling weaker than usual, not being able to urinate very much, and having shortness of breath. She denies fever/chills, NVD, abdominal pain, chest pain. She has a chronic cough that has not changed. She does report some orthopnea. She does have a pulse oximeter at home and reports her O2 saturations have been in the mid to high 80s. She states her legs are more swollen than a week ago despite the extra Lasix. ER evaluation shows her to have JOHN with an elevated BUN and creatinine.   We are asked to admit her for further evaluation and management  Past Medical History/Comorbidities:   Ms. Mana Blanchard  has a past medical history of COPD (chronic obstructive pulmonary disease) ([facility] [address] Di[facility] ([facility] DVT (deep vein thrombosis) in pregnancy, Fibromuscular dysplasia of bilateral renal arteries ([facility] [address] GERD (gastroesophageal reflux disease), HLD (hyperlipidemia), HTN (hypertension), CAREN on CPAP, Osteoarthritis, and Renal artery stenosis ([facility] [address] Ms. Sourav Alexander  has no past surgical history on file.   Social History/Living Environment:   Home Environment: Private residence  # Steps to Enter: 0  Wheelchair Ramp: Yes  One/Two Story Residence: One story  Living Alone: No  Support Systems: Spouse/Significant Other  Patient Expects to be Discharged to[de-identified] Home with home health  Current DME Used/Available at Home: Talha Jeannette, rollator  Tub or Shower Type: Shower  Prior Level of Function/Work/Activity:  Independent; uses rollator     Number of Personal Factors/Comorbidities that affect the Plan of Care: Brief history (0):  LOW COMPLEXITY   ASSESSMENT OF OCCUPATIONAL PERFORMANCE[de-identified]   Activities of Daily Living:   Basic ADLs (From Assessment) Complex ADLs (From Assessment)   Feeding: Setup  Oral Facial Hygiene/Grooming: Setup  Bathing: Minimum assistance  Upper Body Dressing: Setup  Lower Body Dressing: Minimum assistance  Toileting: Setup     Grooming/Bathing/Dressing Activities of Daily Living     Cognitive Retraining  Safety/Judgement: Awareness of environment                 Functional Transfers  Bathroom Mobility: Minimum assistance  Toilet Transfer : Minimum assistance  Shower Transfer: Minimum assistance     Bed/Mat Mobility  Supine to Sit: Minimum assistance  Sit to Stand: Minimum assistance  Stand to Sit: Minimum assistance  Bed to Chair: Minimum assistance  Scooting: Minimum assistance     Most Recent Physical Functioning:   Gross Assessment:  AROM: Generally decreased, functional (BUE)  Strength: Generally decreased, functional (BUE)  Coordination: Generally decreased, functional (BUE)  Tone: Normal  Sensation: Intact               Posture:     Balance:  Sitting: Intact  Standing: With support;Pull to stand Bed Mobility:  Supine to Sit: Minimum assistance  Scooting: Minimum assistance  Wheelchair Mobility:     Transfers:  Sit to Stand: Minimum assistance  Stand to Sit: Minimum assistance  Stand Pivot Transfers: Minimal assistance  Bed to Chair: Minimum assistance  Toilet Transfer : Minimum assistance  Duration: 15 Minutes            Patient Vitals for the past 6 hrs:   BP BP Patient Position SpO2 O2 Flow Rate (L/min) Pulse   03/09/22 0756 (!) 108/50 At rest 95 % -- 71   03/09/22 0829 -- -- 96 % 3 l/min --       Mental Status  Neurologic State: Alert  Orientation Level: Oriented X4  Cognition: Follows commands  Perception: Appears intact  Perseveration: No perseveration noted  Safety/Judgement: Awareness of environment                          Physical Skills Involved:  Balance  Strength  Activity Tolerance Cognitive Skills Affected (resulting in the inability to perform in a timely and safe manner):  none Psychosocial Skills Affected:  none   Number of elements that affect the Plan of Care: 1-3:  LOW COMPLEXITY   CLINICAL DECISION MAKING:   [facility] 6 Clicks   Daily Activity Inpatient Short Form  How much help from another person does the patient currently need. .. Total A Lot A Little None   1. Putting on and taking off regular lower body clothing? [] 1   [x] 2   [] 3   [] 4   2. Bathing (including washing, rinsing, drying)? [] 1   [] 2   [x] 3   [] 4   3. Toileting, which includes using toilet, bedpan or urinal?   [] 1   [] 2   [x] 3   [] 4   4. Putting on and taking off regular upper body clothing? [] 1   [] 2   [] 3   [x] 4   5. Taking care of personal grooming such as brushing teeth? [] 1   [] 2   [] 3   [x] 4   6. Eating meals? [] 1   [] 2   [] 3   [x] 4   © 2007, Trustees of [employer], under license to [employer].  All rights reserved      Score:  Initial:20 Most Recent: X (Date: -- )    Interpretation of Tool:  Represents activities that are increasingly more difficult (i.e. Bed mobility, Transfers, Gait). Medical Necessity:     Patient is expected to demonstrate progress in   strength, balance, coordination, and functional technique   to   increase independence with self care and functional mobility  . Reason for Services/Other Comments:  Patient continues to require skilled intervention due to   Decrease self care and functional mobility  . Use of outcome tool(s) and clinical judgement create a POC that gives a: LOW COMPLEXITY         TREATMENT:   (In addition to Assessment/Re-Assessment sessions the following treatments were rendered)     Pre-treatment Symptoms/Complaints:  tolerated sitting up  in recliner  Pain: Initial:   Pain Intensity 1: 0  Post Session:  0     Self Care: (15): Procedure(s) (per grid) utilized to improve and/or restore self-care/home management as related to toileting, grooming, and functional transfers . Required minimal verbal and   cueing to facilitate activities of daily living skills and compensatory activities. Assessment    Braces/Orthotics/Lines/Etc:   O2 Device: Nasal cannula  Treatment/Session Assessment:    Response to Treatment:  tolerated well  Interdisciplinary Collaboration:   Physical Therapist  Occupational Therapist  Registered Nurse  After treatment position/precautions:   Up in chair  Bed/Chair-wheels locked  Call light within reach  RN notified  LEs reclined    Compliance with Program/Exercises: Compliant all of the time. Recommendations/Intent for next treatment session: \"Next visit will focus on advancements to more challenging activities and reduction in assistance provided\".   Total Treatment Duration:  OT Patient Time In/Time Out  Time In: 0715  Time Out: 10 [facility]

## 2022-03-09 NOTE — PROGRESS NOTES
Problem: Mobility Impaired (Adult and Pediatric)  Goal: *Acute Goals and Plan of Care (Insert Text)  Note: STG:  (1.)Ms. Mayte Barton will move from supine to sit and sit to supine  with CONTACT GUARD ASSIST within 7 treatment day(s). (2.)Ms. Mayte Barton will transfer from bed to chair and chair to bed with CONTACT GUARD ASSIST using the least restrictive device within 7 treatment day(s). (3.)Ms. Mayte Barton will ambulate with CONTACT GUARD ASSIST for 150 feet with the least restrictive device and stable O2 sat >90% within 7 treatment day(s). (4.)Pt. will increase B LE strength 1/2 grade within 7 days. (5.)Pt. will increase endurance to tolerate 15  minutes of LE exs with 1 rest break and stable O2 sat >90%      ________________________________________________________________________________________________      PHYSICAL THERAPY: Initial Assessment and AM 3/9/2022  INPATIENT: PT Visit Days : 1  [place] / Product Type: Medicare /       NAME/AGE/GENDER: Ashia Valenzuela is a 80 y.o. female   PRIMARY DIAGNOSIS: JOHN [facility]acute kidney [facility] [N17.9] <principal problem not specified> <principal problem not specified>        ICD-10: Treatment Diagnosis:    · Generalized Muscle Weakness (M62.81)  · Other lack of cordination (R27.8)  · Other abnormalities of gait and mobility (R26.89)   Precaution/Allergies:  Benzalkonium      ASSESSMENT:     Ms. Mayte Barton presents with JOHN and is experiencing a decrease in her premorbid level of function. She would benefit from further PT while here to address these deficits: decreased general strength and endurance, functional mobility, and standing balance. She plans on returning home with her spouse's assistance. [facility]. She has a rollator that she uses in the home. Today, she is willing to perform LE exs.  She then moves supine to sit with minimal assist. Sit to stand with minimal assist, then amb 25' x 2 with rollator and close CGA. She performs toilet transfer with min assist. Stands at sink to wash hands with CGA. This section established at most recent assessment   PROBLEM LIST (Impairments causing functional limitations):  1. Decreased Strength  2. Decreased ADL/Functional Activities  3. Decreased Transfer Abilities  4. Decreased Ambulation Ability/Technique  5. Decreased Balance  6. Increased Pain  7. Decreased Activity Tolerance  8. Increased Fatigue  9. Increased Shortness of Breath  10. Decreased Flexibility/Joint Mobility  11. Decreased [place] with Home Exercise Program   INTERVENTIONS PLANNED: (Benefits and precautions of physical therapy have been discussed with the patient.)  1. Balance Exercise  2. Bed Mobility  3. Gait Training  4. Therapeutic Activites  5. Therapeutic Exercise/Strengthening  6. Transfer Training     TREATMENT PLAN: Frequency/Duration: daily for duration of hospital stay  Rehabilitation Potential For Stated Goals: [facility] (at time of discharge pending progress):    Placement: It is my opinion, based on this patient's performance to date, that Ms. Conchis Lanier may benefit from [address] after discharge due to the functional deficits listed above that are likely to improve with skilled rehabilitation because he/she has multiple medical issues that affect his/her functional mobility in the community. Equipment:    has a rollator              HISTORY:   History of Present Injury/Illness (Reason for Referral): JOHN  Past Medical History/Comorbidities:   Ms. Conchis Lanier  has a past medical history of COPD (chronic obstructive pulmonary disease) ([address] Diastolic heart failure ([address] DVT (deep vein thrombosis) in pregnancy, Fibromuscular dysplasia of bilateral renal arteries ([address] GERD (gastroesophageal reflux disease), HLD (hyperlipidemia), HTN (hypertension), CAREN on CPAP, Osteoarthritis, and Renal artery stenosis ([address] Ms. Conchis Lanier  has no past surgical history on file.   Social History/Living Environment:   Home Environment: Private residence  # Steps to Enter: 0  Wheelchair Ramp: Yes  One/Two Story Residence: One story  Living Alone: No  Support Systems: Spouse/Significant Other  Patient Expects to be Discharged to[de-identified] Home with home health  Current DME Used/Available at Home: Maebelle Cramp, rollator  Tub or Shower Type: Shower  Prior Level of Function/Work/Activity:  Uses a rollator for amb. Able to perform bathing and dressing on her own. Dominant Side:         RIGHT   Number of Personal Factors/Comorbidities that affect the Plan of Care: 3+: HIGH COMPLEXITY   EXAMINATION:   Most Recent Physical Functioning:   Gross Assessment:  AROM: Generally decreased, functional (B LEs)  Strength: Generally decreased, functional (B LEs grossly 3+)  Coordination: Generally decreased, functional (B LEs)  Sensation: Intact (B LEs)               Posture:     Balance:  Sitting: Intact  Standing: With support;Pull to stand Bed Mobility:  Supine to Sit: Minimum assistance  Scooting: Minimum assistance  Wheelchair Mobility:     Transfers:  Sit to Stand: Minimum assistance  Stand to Sit: Minimum assistance  Stand Pivot Transfers: Minimal assistance  Bed to Chair: Minimum assistance  Toilet Transfer : Minimum assistance  Duration: 15 Minutes  Gait:     Speed/Anne Marie: Slow;Shuffled  Gait Abnormalities: Decreased step clearance;Shuffling gait  Distance (ft): 25 Feet (ft) (x2)  Assistive Device: Walker, rollator  Ambulation - Level of Assistance:  (close CGA)  Interventions: Safety awareness training;Verbal cues; Visual/Demos  Home Environment: Private residence  [address]: Private residence  # Steps to Enter: 0  Wheelchair Ramp: Yes  One/Two Story Residence: One story  Living Alone: No  Support Systems: Spouse/Significant Other  Patient Expects to be Discharged to[de-identified] Home with home health  Current DME Used/Available at Home: Maebelle Cramp, rollator  Tub or Shower Type: Shower      Body Structures Involved:  1. Lungs  2. Joints  3. Muscles Body Functions Affected:  1. Respiratory  2. Genitourinary  3. Movement Related Activities and Participation Affected:  1. General Tasks and Demands  2. Mobility  3. Self Care   Number of elements that affect the Plan of Care: 4+: HIGH COMPLEXITY   CLINICAL PRESENTATION:   Presentation: Stable and uncomplicated: LOW COMPLEXITY   CLINICAL DECISION MAKING:   [facility] 6 Clicks   Basic Mobility Inpatient Short Form  How much difficulty does the patient currently have. .. Unable A Lot A Little None   1. Turning over in bed (including adjusting bedclothes, sheets and blankets)? [] 1   [] 2   [x] 3   [] 4   2. Sitting down on and standing up from a chair with arms ( e.g., wheelchair, bedside commode, etc.)   [] 1   [] 2   [x] 3   [] 4   3. Moving from lying on back to sitting on the side of the bed? [] 1   [] 2   [x] 3   [] 4   How much help from another person does the patient currently need. .. Total A Lot A Little None   4. Moving to and from a bed to a chair (including a wheelchair)? [] 1   [] 2   [x] 3   [] 4   5. Need to walk in hospital room? [] 1   [] 2   [x] 3   [] 4   6. Climbing 3-5 steps with a railing? [] 1   [] 2   [x] 3   [] 4   © 2007, Trustees of [facility], under license to [employer]. All rights reserved      Score:  Initial: 18 Most Recent: X (Date: -- )    Interpretation of Tool:  Represents activities that are increasingly more difficult (i.e. Bed mobility, Transfers, Gait). Medical Necessity:     · Patient demonstrates fair rehab potential due to higher previous functional level. Reason for Services/Other Comments:  · Patient continues to require present interventions due to patient's inability to perform functional mobility at a CGA level.    Use of outcome tool(s) and clinical judgement create a POC that gives a: Clear prediction of patient's progress: LOW COMPLEXITY            TREATMENT:   (In addition to Assessment/Re-Assessment sessions the following treatments were rendered)   Pre-treatment Symptoms/Complaints:  SOB with exertion  Pain: Initial:   Pain Intensity 1: 0  Post Session:  0     Therapeutic Activity: (  15 Minutes ):  Therapeutic activities including Bed transfers, Chair transfers, Toilet transfers, Ambulation on level ground and LE exs to improve mobility, strength, balance, coordination and endurance. Required minimal Safety awareness training;Verbal cues; Visual/Demos to insure safe technique. Date:  3/9/22 Date:   Date:     Activity/Exercise Parameters Parameters Parameters   SUPINE: ankle pumps 10          Hip AB/AD 10          Heel slides 10          SLR 10          SAQ 10     SITTING: LAQ 10               Assessment  provided today    Braces/Orthotics/Lines/Etc:   · O2 Device: Nasal cannula  Treatment/Session Assessment:    · Response to Treatment:  Did well. Amb in room and stayed up in recliner  · Interdisciplinary Collaboration:   o Physical Therapist  o Occupational Therapist  o Registered Nurse  · After treatment position/precautions:   o Up in chair  o Bed/Chair-wheels locked  o Call light within reach  o RN notified   · Compliance with Program/Exercises: Compliant most of the time  · Recommendations/Intent for next treatment session: \"Next visit will focus on advancements to more challenging activities and reduction in assistance provided\".   Total Treatment Duration:  PT Patient Time In/Time Out  Time In: 0700  Time Out: [address], PT

## 2022-03-09 NOTE — PROGRESS NOTES
Physician Progress Note      PATIENT:               Yelena Shoulder  CSN #:                  [id number]  :                       1940  ADMIT DATE:       3/8/2022 6:26 PM  [address] [place] DATE:  RESPONDING  PROVIDER #:        Eriberto Goff MD          QUERY TEXT:    Patient admitted with BMI 44.63. If possible, please document in progress notes and discharge summary if you are evaluating and /or treating any of the following: The medical record reflects the following:  Risk Factors: leg swelling/sob  Clinical Indicators: BMI 44.63  Treatment: daily weights, regular diet  Options provided:  -- Obesity  -- Morbid obesity  -- Severe obesity  -- Overweight  -- Other - I will add my own diagnosis  -- Disagree - Not applicable / Not valid  -- Disagree - Clinically unable to determine / Unknown  -- Refer to Clinical Documentation Reviewer    PROVIDER RESPONSE TEXT:    This patient has severe obesity.     Query created by: Yissel Rojo on 3/9/2022 7:46 AM      Electronically signed by:  Eriberto Goff MD 3/9/2022 3:04 PM

## 2022-03-09 NOTE — H&P
Hospitalist History and Physical   Admit Date:  3/8/2022  6:26 PM   Name:  Jake Varela   Age:  80 y.o. Sex:  female  :  1940   MRN:  [id number]   Room:  [facility]    Presenting Complaint: Leg Swelling    Reason(s) for Admission: JOHN (acute kidney injury) ([facility] [N17.9]     History of Present Illness: This patient was discussed with the ER provider prior to seeing the patient. Pertinent history, physical, diagnostics,   initial treatments, and further plans reviewed. Jake Varela is a 80 y.o. female with medical history of CHF with lower extremity edema, and hypertension, who presented with worsening edema. Patient comes in with her daughter and they report that she has been having increasing swelling for at least a week or so. Her Lasix has been increased to Lasix 80 mg in the morning and 40 mg at night, but still has swelling. She reports feeling weaker than usual, not being able to urinate very much, and having shortness of breath. She denies fever/chills, NVD, abdominal pain, chest pain. She has a chronic cough that has not changed. She does report some orthopnea. She does have a pulse oximeter at home and reports her O2 saturations have been in the mid to high 80s. She states her legs are more swollen than a week ago despite the extra Lasix. ER evaluation shows her to have JOHN with an elevated BUN and creatinine. We are asked to admit her for further evaluation and management    Review of Systems:  10 systems reviewed and negative except as noted in HPI. Assessment & Plan: Active Problems:    JOHN (acute kidney injury) ([facility] (3/8/2022)    avoid nephrotoxic medications; monitor labs  Will monitor closely. Complicated due to patient needing diuretics for her heart disease.       Chronic diastolic congestive heart failure ([facility] (2021)      Essential hypertension (3/8/2022)  Her blood pressure is currently in the normal range without any listed medications  Continue her home medications of Lasix 40 mg daily  Reevaluate in the morning to see if she needs additional IV Lasix versus possibly needing IV fluids. Echocardiogram  Consult cardiology    Of note, she carries a diagnosis of chronic lymphedema as well. It is difficult to know if the swelling is lymphedema versus diastolic CHF related. Will consult wound care to see if they can do compression wrappings of her legs. Dispo/Discharge Planning:   Inpatient, anticipate 2 midnights    Diet: Regular  VTE ppx:  Lovenox  Code status: Full    Hospital Problems as of 3/9/2022 Never Reviewed          Codes Class Noted - Resolved POA    JOHN (acute kidney injury) ([facility] ICD-10-CM: N17.9  ICD-9-CM: 584.9  3/8/2022 - Present Yes        Essential hypertension (Chronic) ICD-10-CM: I10  ICD-9-CM: 401.9  3/8/2022 - Present Yes        Chronic diastolic congestive heart failure (HCC) (Chronic) ICD-10-CM: I50.32  ICD-9-CM: 428.32, 428.0  6/29/2021 - Present Yes              Past History:  No past medical history on file. No past surgical history on file. Allergies   Allergen Reactions    Benzalkonium Other (comments)     Caused pink eye      Social History     Tobacco Use    Smoking status: Not on file    Smokeless tobacco: Not on file   Substance Use Topics    Alcohol use: Not on file        Family Hx:  Does report family history of hypertension, heart disease, and CHF    Family history reviewed and negative except as otherwise noted. There is no immunization history on file for this patient.   Prior to Admit Medications:  Current Outpatient Medications   Medication Instructions    ALPRAZolam (XANAX) 0.5 mg tablet Oral, AS NEEDED    amLODIPine-atorvastatin (CADUET) 10-10 mg per tablet 1 Tablet, Oral, DAILY    aspirin 81 mg, Oral, DAILY    calcium carbonate (TUMS) 200 mg calcium (500 mg) chew 1 Tablet, Oral, DAILY    calcium-cholecalciferol, d3, (CALCIUM 600 + D) 600-125 mg-unit tab Oral    celecoxib (CeleBREX) 200 mg capsule Oral, 2 TIMES DAILY    clotrimazole-betamethasone (LOTRISONE) topical cream Topical, AS NEEDED    fluticasone propion-salmeteroL (Advair HFA) 115-21 mcg/actuation inhaler 2 Puffs, Inhalation, 2 TIMES DAILY    fluticasone propionate (FLONASE) 50 mcg/actuation nasal spray 2 Sprays, Both Nostrils, AS NEEDED    furosemide (LASIX) 40 mg tablet Oral, DAILY    guaiFENesin ER (MUCINEX) 600 mg, Oral, 2 TIMES DAILY    guaiFENesin-dextromethorphan (ROBITUSSIN DM) 100-10 mg/5 mL syrup 5 mL, Oral, 3 TIMES DAILY AS NEEDED    HYDROcodone-acetaminophen (NORCO)  mg tablet 1 Tablet, Oral, EVERY BEDTIME    metaxalone (Skelaxin) 800 mg tablet Oral, 2 TIMES DAILY    polyethylene glycol (MIRALAX) 17 g, Oral, AS NEEDED    potassium 99 mg, Oral, DAILY    pravastatin (PRAVACHOL) 40 mg, Oral, EVERY BEDTIME    RABEprazole (ACIPHEX) 20 mg, Oral, DAILY    sertraline (ZOLOFT) 50 mg tablet Oral, AS NEEDED    triamcinolone acetonide (KENALOG) 0.1 % ointment Topical, AS NEEDED, use thin layer     white pet-mineral oil-lanolin (AKWA TEARS) ophthalmic ointment Ophthalmic, AS NEEDED       Objective:     Patient Vitals for the past 24 hrs:   Temp Pulse Resp BP SpO2   03/09/22 0346 99.1 °F (37.3 °C) 65 -- (!) 128/53 92 %   03/08/22 2200 97.9 °F (36.6 °C) 65 20 135/65 97 %   03/08/22 1847 -- 68 -- 137/64 96 %   03/08/22 1843 -- -- -- -- 96 %   03/08/22 1822 97.7 °F (36.5 °C) 64 20 115/61 90 %     Oxygen Therapy  O2 Sat (%): 92 % (03/09/22 0346)  Pulse via Oximetry: 68 beats per minute (03/08/22 1847)  O2 Device: Nasal cannula (03/08/22 2200)  O2 Flow Rate (L/min): 2 l/min (03/08/22 2200)    Estimated body mass index is 44.63 kg/m² as calculated from the following:    Height as of this encounter: 5' 4\" (1.626 m). Weight as of this encounter: 117.9 kg (260 lb).     Intake/Output Summary (Last 24 hours) at 3/9/2022 0354  Last data filed at 3/8/2022 1798  Gross per 24 hour   Intake --   Output 300 ml   Net -300 ml Physical Exam:    Blood pressure (!) 128/53, pulse 65, temperature 99.1 °F (37.3 °C), resp. rate 20, height 5' 4\" (1.626 m), weight 117.9 kg (260 lb), SpO2 92 %. General:    Well nourished. No apparent distress  HENT:  Normocephalic, atraumatic. Nares appear normal, no drainage. Oropharynx is clear with tacky mucous membranes  Eyes:  Sclerae appear normal.  PERRL; EOMI  Neck:  No restricted ROM. Trachea midline   CV:   RRR. No jugular venous distension. 2-3+ LE edema  Lungs:   CTAB. No wheezing, rhonchi, or rales. Respirations even, unlabored  Abdomen: Bowel sounds present. Soft, nontender, nondistended. Musc/Sk: No cyanosis or clubbing. Skin:     No obvious rashes and normal coloration. Warm and dry. Neuro:  CN II-XII grossly intact. Eye exam as noted above; Moves extremities equally and appropriately  Psych:   Alert and oriented x 4; Judgement and insight are normal;     I have reviewed ordered lab tests and reports of imaging below:    Last 24hr Labs:  Recent Results (from the past 24 hour(s))   EKG, 12 LEAD, INITIAL    Collection Time: 03/08/22  6:42 PM   Result Value Ref Range    Ventricular Rate 67 BPM    Atrial Rate 67 BPM    P-R Interval 216 ms    QRS Duration 108 ms    Q-T Interval 446 ms    QTC Calculation (Bezet) 471 ms    Calculated P Axis 55 degrees    Calculated R Axis 4 degrees    Calculated T Axis 55 degrees    Diagnosis       !! AGE AND GENDER SPECIFIC ECG ANALYSIS !!   Sinus rhythm with 1st degree A-V block  Minimal voltage criteria for LVH, may be normal variant  Nonspecific ST and T wave abnormality  Prolonged QT  Abnormal ECG  No previous ECGs available     CBC WITH AUTOMATED DIFF    Collection Time: 03/08/22  6:46 PM   Result Value Ref Range    WBC 8.9 4.3 - 11.1 K/uL    RBC 4.83 4.05 - 5.2 M/uL    HGB 11.7 11.7 - 15.4 g/dL    HCT 37.6 35.8 - 46.3 %    MCV 77.8 (L) 79.6 - 97.8 FL    MCH 24.2 (L) 26.1 - 32.9 PG    MCHC 31.1 (L) 31.4 - 35.0 g/dL    RDW 17.3 (H) 11.9 - 14.6 %    PLATELET 947 938 - 159 K/uL    MPV 11.0 9.4 - 12.3 FL    ABSOLUTE NRBC 0.00 0.0 - 0.2 K/uL    NEUTROPHILS 46 43 - 78 %    LYMPHOCYTES 33 13 - 44 %    MONOCYTES 12 4.0 - 12.0 %    EOSINOPHILS 8 (H) 0.5 - 7.8 %    BASOPHILS 1 0.0 - 2.0 %    IMMATURE GRANULOCYTES 0 0.0 - 5.0 %    ABS. NEUTROPHILS 4.0 1.7 - 8.2 K/UL    ABS. LYMPHOCYTES 2.9 0.5 - 4.6 K/UL    ABS. MONOCYTES 1.1 0.1 - 1.3 K/UL    ABS. EOSINOPHILS 0.7 0.0 - 0.8 K/UL    ABS. BASOPHILS 0.1 0.0 - 0.2 K/UL    ABS. IMM. GRANS. 0.0 0.0 - 0.5 K/UL    DF AUTOMATED     METABOLIC PANEL, COMPREHENSIVE    Collection Time: 03/08/22  6:46 PM   Result Value Ref Range    Sodium 134 (L) 136 - 145 mmol/L    Potassium 2.8 (L) 3.5 - 5.1 mmol/L    Chloride 91 (L) 98 - 107 mmol/L    CO2 34 (H) 21 - 32 mmol/L    Anion gap 9 7 - 16 mmol/L    Glucose 132 (H) 65 - 100 mg/dL    BUN 51 (H) 8 - 23 MG/DL    Creatinine 1.17 (H) 0.6 - 1.0 MG/DL    GFR est AA 57 (L) >60 ml/min/1.73m2    GFR est non-AA 47 (L) >60 ml/min/1.73m2    Calcium 10.0 8.3 - 10.4 MG/DL    Bilirubin, total 0.4 0.2 - 1.1 MG/DL    ALT (SGPT) 29 12 - 65 U/L    AST (SGOT) 37 15 - 37 U/L    Alk.  phosphatase 111 50 - 130 U/L    Protein, total 7.8 6.3 - 8.2 g/dL    Albumin 3.7 3.2 - 4.6 g/dL    Globulin 4.1 (H) 2.3 - 3.5 g/dL    A-G Ratio 0.9 (L) 1.2 - 3.5     MAGNESIUM    Collection Time: 03/08/22  6:46 PM   Result Value Ref Range    Magnesium 2.7 (H) 1.8 - 2.4 mg/dL   NT-PRO BNP    Collection Time: 03/08/22  6:46 PM   Result Value Ref Range    NT pro- (H) <450 PG/ML   COVID-19 RAPID TEST    Collection Time: 03/08/22  8:07 PM   Result Value Ref Range    Specimen source NASAL SWAB      COVID-19 rapid test Not detected NOTD         All Micro Results     Procedure Component Value Units Date/Time    COVID-19 RAPID TEST [[id number]] Collected: 03/08/22 2007    Order Status: Completed Specimen: Nasopharyngeal Updated: 03/08/22 2032     Specimen source NASAL SWAB        COVID-19 rapid test Not detected Comment:      The specimen is NEGATIVE for SARS-CoV-2, the novel coronavirus associated with COVID-19. A negative result does not rule out COVID-19. This test has been authorized by the FDA under an Emergency Use Authorization (EUA) for use by authorized laboratories. Fact sheet for Healthcare Providers: iTendency.uy  Fact sheet for Patients: iTendency.uy       Methodology: Isothermal Nucleic Acid Amplification               Other Studies:  XR CHEST PORT    Result Date: 3/8/2022  History: Swelling in the legs, one-week duration. Exam: portable chest Comparison: 8/30/2021 Findings: The lungs are clear. The mediastinal contour and osseous structures are normal. IMPRESSIONs: No acute findings.        Medications Administered     enoxaparin (LOVENOX) injection 40 mg     Admin Date  03/08/2022 Action  Given Dose  40 mg Route  SubCUTAneous Administered By  Elana Gandhi RN          furosemide (LASIX) injection 80 mg     Admin Date  03/08/2022 Action  Given Dose  80 mg Route  IntraVENous Administered By  Jose Huerta RN          potassium chloride (K-DUR, KLOR-CON M20) SR tablet 40 mEq     Admin Date  03/08/2022 Action  Given Dose  40 mEq Route  Oral Administered By  Mel Cope RN           Admin Date  03/08/2022 Action  Given Dose  40 mEq Route  Oral Administered By  Elana Gandhi RN          potassium chloride 20 mEq in 100 ml IVPB     Admin Date  03/08/2022 Action  New Bag Dose  20 mEq Rate  50 mL/hr Route  IntraVENous Administered By  Ealna Gandhi RN           Admin Date  03/09/2022 Action  New Bag Dose  20 mEq Rate  50 mL/hr Route  IntraVENous Administered By  Elana Gandhi RN          sodium chloride (NS) flush 5-10 mL     Admin Date  03/08/2022 Action  Given Dose  10 mL Route  IntraVENous Administered By  Elana Gandhi RN                Signed:  Terry Crisostomo MD    Part of this note may have been written by using a voice dictation software. The note has been proof read but may still contain some grammatical/other typographical errors.

## 2022-03-10 ENCOUNTER — HOME HEALTH ADMISSION (OUTPATIENT)
Dept: HOME HEALTH SERVICES | Facility: HOME HEALTH | Age: 82
End: 2022-03-10

## 2022-03-10 VITALS
DIASTOLIC BLOOD PRESSURE: 69 MMHG | HEIGHT: 64 IN | OXYGEN SATURATION: 96 % | SYSTOLIC BLOOD PRESSURE: 130 MMHG | HEART RATE: 85 BPM | RESPIRATION RATE: 18 BRPM | WEIGHT: 260 LBS | TEMPERATURE: 98.1 F | BODY MASS INDEX: 44.39 KG/M2

## 2022-03-10 PROBLEM — E66.01 CLASS 3 SEVERE OBESITY DUE TO EXCESS CALORIES WITH SERIOUS COMORBIDITY AND BODY MASS INDEX (BMI) OF 40.0 TO 44.9 IN ADULT (HCC): Chronic | Status: ACTIVE | Noted: 2022-03-10

## 2022-03-10 PROBLEM — K21.9 GASTROESOPHAGEAL REFLUX DISEASE WITHOUT ESOPHAGITIS: Status: ACTIVE | Noted: 2020-01-16

## 2022-03-10 PROBLEM — E78.5 DYSLIPIDEMIA: Status: ACTIVE | Noted: 2022-03-10

## 2022-03-10 PROBLEM — M12.811 ROTATOR CUFF TEAR ARTHROPATHY OF RIGHT SHOULDER: Status: ACTIVE | Noted: 2018-09-06

## 2022-03-10 PROBLEM — I77.3 FIBROMUSCULAR DYSPLASIA (HCC): Status: ACTIVE | Noted: 2022-03-10

## 2022-03-10 PROBLEM — J45.30 MILD PERSISTENT ASTHMA WITHOUT COMPLICATION: Chronic | Status: ACTIVE | Noted: 2018-03-14

## 2022-03-10 PROBLEM — J45.30 MILD PERSISTENT ASTHMA WITHOUT COMPLICATION: Status: ACTIVE | Noted: 2018-03-14

## 2022-03-10 PROBLEM — I50.33 ACUTE ON CHRONIC DIASTOLIC CONGESTIVE HEART FAILURE (HCC): Status: ACTIVE | Noted: 2021-06-29

## 2022-03-10 PROBLEM — I50.33 ACUTE ON CHRONIC DIASTOLIC CONGESTIVE HEART FAILURE (HCC): Status: RESOLVED | Noted: 2021-06-29 | Resolved: 2022-03-10

## 2022-03-10 PROBLEM — M75.101 ROTATOR CUFF TEAR ARTHROPATHY OF RIGHT SHOULDER: Status: ACTIVE | Noted: 2018-09-06

## 2022-03-10 PROBLEM — M17.0 PRIMARY OSTEOARTHRITIS OF BOTH KNEES: Status: ACTIVE | Noted: 2017-05-26

## 2022-03-10 PROBLEM — N17.9 AKI (ACUTE KIDNEY INJURY) (HCC): Status: RESOLVED | Noted: 2022-03-08 | Resolved: 2022-03-10

## 2022-03-10 PROBLEM — J32.8 OTHER CHRONIC SINUSITIS: Status: ACTIVE | Noted: 2019-01-25

## 2022-03-10 PROBLEM — M17.0 PRIMARY OSTEOARTHRITIS OF BOTH KNEES: Chronic | Status: ACTIVE | Noted: 2017-05-26

## 2022-03-10 LAB
ANION GAP SERPL CALC-SCNC: 5 MMOL/L (ref 7–16)
BASOPHILS # BLD: 0.1 K/UL (ref 0–0.2)
BASOPHILS NFR BLD: 1 % (ref 0–2)
BUN SERPL-MCNC: 38 MG/DL (ref 8–23)
CALCIUM SERPL-MCNC: 10.2 MG/DL (ref 8.3–10.4)
CHLORIDE SERPL-SCNC: 98 MMOL/L (ref 98–107)
CO2 SERPL-SCNC: 33 MMOL/L (ref 21–32)
CREAT SERPL-MCNC: 0.85 MG/DL (ref 0.6–1)
DIFFERENTIAL METHOD BLD: ABNORMAL
EOSINOPHIL # BLD: 0.4 K/UL (ref 0–0.8)
EOSINOPHIL NFR BLD: 6 % (ref 0.5–7.8)
ERYTHROCYTE [DISTWIDTH] IN BLOOD BY AUTOMATED COUNT: 17.4 % (ref 11.9–14.6)
GLUCOSE SERPL-MCNC: 136 MG/DL (ref 65–100)
HCT VFR BLD AUTO: 34.4 % (ref 35.8–46.3)
HGB BLD-MCNC: 10.5 G/DL (ref 11.7–15.4)
IMM GRANULOCYTES # BLD AUTO: 0 K/UL (ref 0–0.5)
IMM GRANULOCYTES NFR BLD AUTO: 0 % (ref 0–5)
LYMPHOCYTES # BLD: 2.2 K/UL (ref 0.5–4.6)
LYMPHOCYTES NFR BLD: 32 % (ref 13–44)
MCH RBC QN AUTO: 24.1 PG (ref 26.1–32.9)
MCHC RBC AUTO-ENTMCNC: 30.5 G/DL (ref 31.4–35)
MCV RBC AUTO: 78.9 FL (ref 79.6–97.8)
MONOCYTES # BLD: 1 K/UL (ref 0.1–1.3)
MONOCYTES NFR BLD: 14 % (ref 4–12)
NEUTS SEG # BLD: 3.2 K/UL (ref 1.7–8.2)
NEUTS SEG NFR BLD: 46 % (ref 43–78)
NRBC # BLD: 0 K/UL (ref 0–0.2)
PLATELET # BLD AUTO: 294 K/UL (ref 150–450)
PMV BLD AUTO: 11 FL (ref 9.4–12.3)
POTASSIUM SERPL-SCNC: 3.3 MMOL/L (ref 3.5–5.1)
RBC # BLD AUTO: 4.36 M/UL (ref 4.05–5.2)
SODIUM SERPL-SCNC: 136 MMOL/L (ref 136–145)
WBC # BLD AUTO: 6.9 K/UL (ref 4.3–11.1)

## 2022-03-10 PROCEDURE — 85025 COMPLETE CBC W/AUTO DIFF WBC: CPT

## 2022-03-10 PROCEDURE — 94640 AIRWAY INHALATION TREATMENT: CPT

## 2022-03-10 PROCEDURE — 80048 BASIC METABOLIC PNL TOTAL CA: CPT

## 2022-03-10 PROCEDURE — 77010033678 HC OXYGEN DAILY

## 2022-03-10 PROCEDURE — 36415 COLL VENOUS BLD VENIPUNCTURE: CPT

## 2022-03-10 PROCEDURE — 94760 N-INVAS EAR/PLS OXIMETRY 1: CPT

## 2022-03-10 PROCEDURE — 74011250637 HC RX REV CODE- 250/637: Performed by: FAMILY MEDICINE

## 2022-03-10 PROCEDURE — 74011000250 HC RX REV CODE- 250: Performed by: EMERGENCY MEDICINE

## 2022-03-10 PROCEDURE — 74011250637 HC RX REV CODE- 250/637: Performed by: INTERNAL MEDICINE

## 2022-03-10 PROCEDURE — 74011250636 HC RX REV CODE- 250/636: Performed by: FAMILY MEDICINE

## 2022-03-10 RX ORDER — BUMETANIDE 1 MG/1
1 TABLET ORAL DAILY
Qty: 30 TABLET | Refills: 0 | Status: SHIPPED | OUTPATIENT
Start: 2022-03-11

## 2022-03-10 RX ORDER — LISINOPRIL 2.5 MG/1
2.5 TABLET ORAL DAILY
Qty: 30 TABLET | Refills: 0 | Status: SHIPPED | OUTPATIENT
Start: 2022-03-10

## 2022-03-10 RX ORDER — METOPROLOL SUCCINATE 25 MG/1
12.5 TABLET, EXTENDED RELEASE ORAL DAILY
Qty: 15 TABLET | Refills: 0 | Status: SHIPPED | OUTPATIENT
Start: 2022-03-10

## 2022-03-10 RX ADMIN — BUMETANIDE 1 MG: 1 TABLET ORAL at 08:35

## 2022-03-10 RX ADMIN — ASPIRIN 81 MG: 81 TABLET, CHEWABLE ORAL at 08:35

## 2022-03-10 RX ADMIN — POTASSIUM CHLORIDE 40 MEQ: 20 TABLET, EXTENDED RELEASE ORAL at 08:36

## 2022-03-10 RX ADMIN — ENOXAPARIN SODIUM 40 MG: 100 INJECTION SUBCUTANEOUS at 08:40

## 2022-03-10 RX ADMIN — GUAIFENESIN 600 MG: 600 TABLET, EXTENDED RELEASE ORAL at 08:36

## 2022-03-10 RX ADMIN — PANTOPRAZOLE SODIUM 40 MG: 40 TABLET, DELAYED RELEASE ORAL at 06:02

## 2022-03-10 RX ADMIN — BUDESONIDE AND FORMOTEROL FUMARATE DIHYDRATE 2 PUFF: 160; 4.5 AEROSOL RESPIRATORY (INHALATION) at 10:29

## 2022-03-10 RX ADMIN — ANTACID TABLETS 200 MG: 500 TABLET, CHEWABLE ORAL at 08:34

## 2022-03-10 RX ADMIN — SERTRALINE 50 MG: 50 TABLET, FILM COATED ORAL at 08:36

## 2022-03-10 RX ADMIN — PROMETHAZINE HYDROCHLORIDE 12.5 MG: 25 TABLET ORAL at 11:30

## 2022-03-10 RX ADMIN — SODIUM CHLORIDE, PRESERVATIVE FREE 10 ML: 5 INJECTION INTRAVENOUS at 06:00

## 2022-03-10 RX ADMIN — AMLODIPINE BESYLATE 10 MG: 10 TABLET ORAL at 08:36

## 2022-03-10 NOTE — PROGRESS NOTES
END OF SHIFT NOTE:    Intake/Output  No intake/output data recorded. Voiding: YES  Catheter: NO  Drain:              Stool:  0 occurrences. Emesis:  0 occurrences. VITAL SIGNS  Patient Vitals for the past 12 hrs:   Temp Pulse Resp BP SpO2   03/09/22 1928 98.3 °F (36.8 °C) 69 20 (!) 122/46 97 %   03/09/22 1601 98.5 °F (36.9 °C) 73 20 (!) 107/44 92 %   03/09/22 1152 97.7 °F (36.5 °C) 71 20 (!) 116/51 97 %   03/09/22 0829 -- -- -- -- 96 %       Pain Assessment  Pain 1  Pain Scale 1: Numeric (0 - 10) (03/09/22 1048)  Pain Intensity 1: 0 (03/09/22 1048)  Patient Stated Pain Goal: 0 (03/09/22 0756)    Ambulating  Yes    Additional Information:     Shift report given to oncoming nurse at the bedside.     Luly Malik RN

## 2022-03-10 NOTE — PROGRESS NOTES
Discharge instructions reviewed with patient and daughter. Signed prescriptions sent with patient, and follow-up with cardiology and PCP discussed with patient. New medications reviewed with nurse and case management at bedside. Oxygen tank supplied and sent with roller to patients home. Long term oxygen required was discussed. Patient rolled out by staff to vehicle to ride home with daughter. No further questions at this time. Symptoms of hypokalemia reviewed with both patient and daughter along with discussion of returning to hospital with any questions or concerns. Patient stated that she is aware that follow-up labs are required to continue to monitor for potential electrolyte imbalances in the future.

## 2022-03-10 NOTE — DISCHARGE SUMMARY
Hospitalist Discharge Summary   Admit Date:  3/8/2022  6:26 PM   DC Note date: 3/10/2022  Name:  Bk Villegas   Age:  80 y.o. Sex:  female  :  1940   MRN:  [id number]   Room:  [facility]  PCP:  Roland Bueno MD    Presenting Complaint: Leg Swelling    Initial Admission Diagnosis: JOHN (acute kidney injury) ([facility] [N17.9]     Problem List for this Hospitalization:  Hospital Problems as of 3/10/2022 Date Reviewed: 3/10/2022          Codes Class Noted - Resolved POA    Class 3 severe obesity due to excess calories with serious comorbidity and body mass index (BMI) of 40.0 to 44.9 in [facility]) (Chronic) ICD-10-CM: E66.01, Z68.41  ICD-9-CM: 278.01, V85.41  3/10/2022 - Present Yes        Fibromuscular dysplasia ([facility] ICD-10-CM: I77.3  ICD-9-CM: 447.8  3/10/2022 - Present Yes    Overview Addendum 3/10/2022 10:49 AM by Donna Huerta MD     renal             Dyslipidemia ICD-10-CM: E78.5  ICD-9-CM: 272.4  3/10/2022 - Present Yes        Primary hypertension (Chronic) ICD-10-CM: I10  ICD-9-CM: 401.9  3/8/2022 - Present Yes        Gastroesophageal reflux disease without esophagitis ICD-10-CM: K21.9  ICD-9-CM: 530.81  2020 - Present Yes    Overview Addendum 3/10/2022 10:49 AM by Donna Huerta MD     currently on aciphex daily             Mild persistent asthma without complication (Chronic) [id number]: J45.30  ICD-9-CM: 493.90  3/14/2018 - Present Yes    Overview Addendum 3/10/2022 10:49 AM by Donna Huerta MD     Spirometry without obstruction, normal FVC and FEV1. DLCO is normal, not corrected for hgb.              Primary osteoarthritis of both knees (Chronic) ICD-10-CM: M17.0  ICD-9-CM: 715.16  2017 - Present Yes        Lymphedema of both lower extremities (Chronic) ICD-10-CM: I89.0  ICD-9-CM: 457.1  2016 - Present Yes        CAREN (obstructive sleep apnea) ICD-10-CM: G47.33  ICD-9-CM: 327.23  2016 - Present Yes    Overview Addendum 3/10/2022 10:50 AM by Donna Huerta MD May 22, 2007 but night study demonstrated mild CAREN with AHI 10/h, PLM index 10/h, titrated CPAP 8 cm H2O with residual RDI 6.6/h. Chronic diastolic heart failure (HCC) (Chronic) ICD-10-CM: I50.32  ICD-9-CM: 428.32  1/4/2016 - Present Yes        RESOLVED: JOHN (acute kidney injury) ([facility] ICD-10-CM: N17.9  ICD-9-CM: 584.9  3/8/2022 - 3/10/2022 Yes        * (Principal) RESOLVED: Acute on chronic diastolic congestive heart failure [facility]) ICD-10-CM: I50.33  ICD-9-CM: 428.33, 428.0  6/29/2021 - 3/10/2022 Yes            Did Patient have Sepsis (YES OR NO): No    Hospital Course:  26-year-old female with past medical history of hypertension, hyperlipidemia, GERD, asthma, COPD, CAREN, heart failure with preserved ejection fraction, presented in the setting of worsening bilateral lower extremity swelling associated with shortness of breath. Furosemide was discontinued and bumetanide was initiated. Cardiology was consulted. Echocardiogram was performed, showing diastolic dysfunction. Initially had increase in serum creatinine, which corrected with hemodynamic stability. Determined to be safe for discharge 3/10. Expressed a strong preference to go home. She should follow-up with cardiology within 2 weeks. She should follow-up with her primary care provider within 1 week. Primary care provider may consider the following:  -Medication changes as noted below  -Blood pressure stabilization of new medical regimen  -Planned follow-up with cardiology, at discharge was considering 2 options  -Weight management with diet and exercise  -Safety at home    Disposition: Home or Self Care  Diet: ADULT DIET Regular  Code Status: Full Code    Follow Up Orders: Follow-up Appointments   Procedures    FOLLOW UP VISIT Appointment in: Two Weeks Follow up appointment with Dr. Merlinda Furlough or anyone in the group. Sees a [facility] cardiologist, but wants to change. Follow up appointment with Dr. Merlinda Furlough or anyone in the group.  Sees a [facility] cardiologist, but wants to change. Standing Status:   Standing     Number of Occurrences:   1     Order Specific Question:   Appointment in     Answer: Two Weeks    FOLLOW UP VISIT Appointment in: One Week Follow-up with primary care provider within 1 week. Follow-up with primary care provider within 1 week. Standing Status:   Standing     Number of Occurrences:   1     Standing Expiration Date:   3/11/2022     Order Specific Question:   Appointment in     Answer: One Week       Follow-up Information     Follow up With Specialties Details Why MD [facility]           Time spent in patient discharge and coordination 37 minutes. Plan was discussed with patient, daughter, nurse, . All questions answered. Patient was stable at time of discharge. Instructions given to call a physician or return if any concerns. Discharge Info:   Current Discharge Medication List      START taking these medications    Details   bumetanide (BUMEX) 1 mg tablet Take 1 Tablet by mouth daily. Qty: 30 Tablet, Refills: 0  Start date: 3/11/2022      lisinopriL (PRINIVIL, ZESTRIL) 2.5 mg tablet Take 1 Tablet by mouth daily. Qty: 30 Tablet, Refills: 0  Start date: 3/10/2022      metoprolol succinate (TOPROL-XL) 25 mg XL tablet Take 0.5 Tablets by mouth daily. Qty: 15 Tablet, Refills: 0  Start date: 3/10/2022         CONTINUE these medications which have NOT CHANGED    Details   potassium 99 mg tablet Take 99 mg by mouth daily. RABEprazole (ACIPHEX) 20 mg TbEC Take 20 mg by mouth daily. pravastatin (PRAVACHOL) 40 mg tablet Take 40 mg by mouth nightly. calcium carbonate (TUMS) 200 mg calcium (500 mg) chew Take 1 Tablet by mouth daily. calcium-cholecalciferol, d3, (CALCIUM 600 + D) 600-125 mg-unit tab Take  by mouth. clotrimazole-betamethasone (LOTRISONE) topical cream Apply  to affected area as needed for Skin Irritation. triamcinolone acetonide (KENALOG) 0.1 % ointment Apply  to affected area as needed for Skin Irritation. use thin layer      aspirin 81 mg chewable tablet Take 81 mg by mouth daily. metaxalone (Skelaxin) 800 mg tablet Take  by mouth two (2) times a day. celecoxib (CeleBREX) 200 mg capsule Take  by mouth two (2) times a day. guaiFENesin ER (Mucinex) 600 mg ER tablet Take 600 mg by mouth two (2) times a day. fluticasone propionate (FLONASE) 50 mcg/actuation nasal spray 2 Sprays by Both Nostrils route as needed. fluticasone propion-salmeteroL (Advair HFA) 115-21 mcg/actuation inhaler Take 2 Puffs by inhalation two (2) times a day. ALPRAZolam (XANAX) 0.5 mg tablet Take  by mouth as needed for Anxiety. sertraline (ZOLOFT) 50 mg tablet Take  by mouth as needed. guaiFENesin-dextromethorphan (ROBITUSSIN DM) 100-10 mg/5 mL syrup Take 5 mL by mouth three (3) times daily as needed for Cough or Congestion. HYDROcodone-acetaminophen (NORCO)  mg tablet Take 1 Tablet by mouth nightly. white pet-mineral oil-lanolin (AKWA TEARS) ophthalmic ointment Apply  to eye as needed. polyethylene glycol (Miralax) 17 gram packet Take 17 g by mouth as needed for Constipation. STOP taking these medications       amLODIPine-atorvastatin (CADUET) 10-10 mg per tablet Comments:   Reason for Stopping:         furosemide (LASIX) 40 mg tablet Comments:   Reason for Stopping:               Procedures done this admission:  * No surgery found *    Consults this admission:  IP CONSULT TO CARDIOLOGY    Echocardiogram/EKG results:  Results from Hospital Encounter encounter on 03/08/22    ECHO ADULT COMPLETE    Interpretation Summary    Left Ventricle: Left ventricle size is normal. Normal wall thickness. Normal wall motion. Normal left ventricular systolic function with a visually estimated EF of 50 - 65%. Abnormal diastolic function.   Left Atrium: Left atrium is mildly dilated. EKG Results     Procedure 720 Value Units Date/Time    EKG [[id number]] Collected: 03/08/22 1842    Order Status: Completed Updated: 03/09/22 0653     Ventricular Rate 67 BPM      Atrial Rate 67 BPM      P-R Interval 216 ms      QRS Duration 108 ms      Q-T Interval 446 ms      QTC Calculation (Bezet) 471 ms      Calculated P Axis 55 degrees      Calculated R Axis 4 degrees      Calculated T Axis 55 degrees      Diagnosis --       Sinus rhythm with 1st degree A-V block  Minimal voltage criteria for LVH, may be normal variant  Nonspecific ST and T wave abnormality  Prolonged QT  Abnormal ECG  No previous ECGs available  Confirmed by Heather Faria ([id number]) on 3/9/2022 6:53:15 AM            Diagnostic Imaging/Tests:   XR CHEST PORT    Result Date: 3/8/2022  History: Swelling in the legs, one-week duration. Exam: portable chest Comparison: 8/30/2021 Findings: The lungs are clear. The mediastinal contour and osseous structures are normal. IMPRESSIONs: No acute findings. ECHO ADULT COMPLETE    Result Date: 3/9/2022    Left Ventricle: Left ventricle size is normal. Normal wall thickness. Normal wall motion. Normal left ventricular systolic function with a visually estimated EF of 50 - 65%. Abnormal diastolic function.   Left Atrium: Left atrium is mildly dilated. All Micro Results     Procedure Component Value Units Date/Time    COVID-19 RAPID TEST [[id number]] Collected: 03/08/22 2007    Order Status: Completed Specimen: Nasopharyngeal Updated: 03/08/22 2032     Specimen source NASAL SWAB        COVID-19 rapid test Not detected        Comment:      The specimen is NEGATIVE for SARS-CoV-2, the novel coronavirus associated with COVID-19. A negative result does not rule out COVID-19. This test has been authorized by the FDA under an Emergency Use Authorization (EUA) for use by authorized laboratories.         Fact sheet for Healthcare Providers: ConventionUpdate.co.nz  Fact sheet for Patients: [facility].co.nz       Methodology: Isothermal Nucleic Acid Amplification               Labs: Results:       BMP, Mg, Phos Recent Labs     03/10/22  0547 03/09/22  0556 03/08/22  1846    137 134*   K 3.3* 3.1* 2.8*   CL 98 95* 91*   CO2 33* 33* 34*   AGAP 5* 9 9   BUN 38* 45* 51*   CREA 0.85 0.94 1.17*   CA 10.2 9.7 10.0   * 115* 132*   MG  --  2.5* 2.7*      CBC Recent Labs     03/10/22  0547 03/09/22  0556 03/08/22  1846   WBC 6.9 6.4 8.9   RBC 4.36 4.16 4.83   HGB 10.5* 10.0* 11.7   HCT 34.4* 32.5* 37.6    298 383   GRANS 46 44 46   LYMPH 32 32 33   EOS 6 8* 8*   MONOS 14* 15* 12   BASOS 1 1 1   IG 0 0 0   ANEU 3.2 2.8 4.0   ABL 2.2 2.0 2.9   YUDI 0.4 0.5 0.7   ABM 1.0 1.0 1.1   ABB 0.1 0.1 0.1   AIG 0.0 0.0 0.0      LFT Recent Labs     03/08/22  1846   ALT 29      TP 7.8   ALB 3.7   GLOB 4.1*   AGRAT 0.9*      Cardiac Testing No results found for: BNPP, BNP, CPK, RCK1, RCK2, RCK3, RCK4, CKMB, CKNDX, CKND1, TROPT, TROIQ   Coagulation Tests No results found for: PTP, INR, APTT, INREXT   A1c No results found for: HBA1C, [id number]   Lipid Panel No results found for: CHOL, CHOLPOCT, CHOLX, CHLST, CHOLV, 347169, HDL, HDLP, LDL, LDLC, DLDLP, 993399, VLDLC, VLDL, TGLX, TRIGL, TRIGP, TGLPOCT, CHHD, CHHDX   Thyroid Panel No results found for: TSH, T4, FT4, TT3, T3U, TSHEXT     Most Recent UA No results found for: COLOR, APPRN, REFSG, LUIS, PROTU, GLUCU, KETU, BILU, BLDU, UROU, CRUZ, LEUKU, WBCU, RBCU, UEPI, BACTU, CASTS, UCRY, MUCUS, UCOM       All Labs from Last 24 Hrs:  Recent Results (from the past 24 hour(s))   ECHO ADULT COMPLETE    Collection Time: 03/09/22 12:01 PM   Result Value Ref Range    LV EDV A2C 118 mL    LV EDV A4C 116 mL    LV ESV A2C 54 mL    LV ESV A4C 57 mL    IVSd 1.8 (A) 0.6 - 0.9 cm    LVIDd 4.3 3.9 - 5.3 cm    LVIDs 2.9 cm    LVOT Diameter 1.9 cm    LVOT Mean Gradient 6 mmHg    LVOT VTI 39.9 cm    LVOT Peak Velocity 1.8 m/s    LVOT Peak Gradient 13 mmHg    LVPWd 1.7 (A) 0.6 - 0.9 cm    LV E' Lateral Velocity 8 cm/s    LV E' Septal Velocity 6 cm/s    LV Ejection Fraction A2C 55 %    LV Ejection Fraction A4C 51 %    EF BP 53 (A) 55 - 100 %    LVOT Area 2.8 cm2    LVOT .1 ml    LA Minor Axis 5.3 cm    LA Major [place] 5.5 cm    LA Area 2C 19.3 cm2    LA Area 4C 19.5 cm2    LA Volume BP 56 (A) 22 - 52 mL    LA Diameter 3.3 cm    AV Mean Gradient 9 mmHg    AV VTI 45.9 cm    AV Mean Velocity 1.4 m/s    AV Peak Velocity 1.9 m/s    AV Peak Gradient 15 mmHg    AV Area by VTI 2.5 cm2    AV Area by Peak Velocity 2.6 cm2    Aortic Root 3.4 cm    Ascending Aorta 3.3 cm    IVC Expiration 1.7 cm    MV E Wave Deceleration Time 206.0 ms    MV A Velocity 1.11 m/s    MV E Velocity 0.85 m/s    PV .0 ms    PV Max Velocity 1.6 m/s    PV Peak Gradient 10 mmHg    RVIDd 3.7 cm    RV Basal Dimension 3.8 cm    TR Max Velocity 3.33 m/s    TR Peak Gradient 44 mmHg    Fractional Shortening 2D 33 28 - 44 %    LV ESV Index A4C 26 mL/m2    LV EDV Index A4C 53 mL/m2    LV ESV Index A2C 25 mL/m2    LV EDV Index A2C 54 mL/m2    LVIDd Index 1.96 cm/m2    LVIDs Index 1.32 cm/m2    LV RWT Ratio 0.79     LV Mass 2D 329.3 (A) 67 - 162 g    LV Mass 2D Index 150.4 (A) 43 - 95 g/m2    MV E/A 0.77     E/E' Ratio (Averaged) 12.40     E/E' Lateral 10.63     E/E' Septal 14.17     LA Volume Index BP 26 16 - 34 ml/m2    LVOT Stroke Volume Index 51.6 mL/m2    LA Size Index 1.51 cm/m2    LA/AO Root Ratio 0.97     Ao Root Index 1.55 cm/m2    Ascending Aorta Index 1.51 cm/m2    AV Velocity Ratio 0.95     LVOT:AV VTI Index 0.87     LUCERO/BSA VTI 1.1 cm2/m2    LUCERO/BSA Peak Velocity 1.2 cm2/m2    Est. RA Pressure 3 mmHg    RVSP 47 mmHg   METABOLIC PANEL, BASIC    Collection Time: 03/10/22  5:47 AM   Result Value Ref Range    Sodium 136 136 - 145 mmol/L    Potassium 3.3 (L) 3.5 - 5.1 mmol/L    Chloride 98 98 - 107 mmol/L    CO2 33 (H) 21 - 32 mmol/L    Anion gap 5 (L) 7 - 16 mmol/L    Glucose 136 (H) 65 - 100 mg/dL    BUN 38 (H) 8 - 23 MG/DL    Creatinine 0.85 0.6 - 1.0 MG/DL    GFR est AA >60 >60 ml/min/1.73m2    GFR est non-AA >60 >60 ml/min/1.73m2    Calcium 10.2 8.3 - 10.4 MG/DL   CBC WITH AUTOMATED DIFF    Collection Time: 03/10/22  5:47 AM   Result Value Ref Range    WBC 6.9 4.3 - 11.1 K/uL    RBC 4.36 4.05 - 5.2 M/uL    HGB 10.5 (L) 11.7 - 15.4 g/dL    HCT 34.4 (L) 35.8 - 46.3 %    MCV 78.9 (L) 79.6 - 97.8 FL    MCH 24.1 (L) 26.1 - 32.9 PG    MCHC 30.5 (L) 31.4 - 35.0 g/dL    RDW 17.4 (H) 11.9 - 14.6 %    PLATELET 258 210 - 145 K/uL    MPV 11.0 9.4 - 12.3 FL    ABSOLUTE NRBC 0.00 0.0 - 0.2 K/uL    DF AUTOMATED      NEUTROPHILS 46 43 - 78 %    LYMPHOCYTES 32 13 - 44 %    MONOCYTES 14 (H) 4.0 - 12.0 %    EOSINOPHILS 6 0.5 - 7.8 %    BASOPHILS 1 0.0 - 2.0 %    IMMATURE GRANULOCYTES 0 0.0 - 5.0 %    ABS. NEUTROPHILS 3.2 1.7 - 8.2 K/UL    ABS. LYMPHOCYTES 2.2 0.5 - 4.6 K/UL    ABS. MONOCYTES 1.0 0.1 - 1.3 K/UL    ABS. EOSINOPHILS 0.4 0.0 - 0.8 K/UL    ABS. BASOPHILS 0.1 0.0 - 0.2 K/UL    ABS. IMM.  GRANS. 0.0 0.0 - 0.5 K/UL       Current Med List in Hospital:   Current Facility-Administered Medications   Medication Dose Route Frequency    ALPRAZolam (XANAX) tablet 0.5 mg  0.5 mg Oral TID PRN    amLODIPine (NORVASC) tablet 10 mg  10 mg Oral DAILY    aspirin chewable tablet 81 mg  81 mg Oral DAILY    calcium carbonate (TUMS) chewable tablet 200 mg [elemental]  200 mg Oral DAILY    budesonide-formoteroL (SYMBICORT) 160-4.5 mcg/actuation HFA inhaler 2 Puff  2 Puff Inhalation BID RT    guaiFENesin ER (MUCINEX) tablet 600 mg  600 mg Oral Q12H    pravastatin (PRAVACHOL) tablet 40 mg  40 mg Oral QHS    pantoprazole (PROTONIX) tablet 40 mg  40 mg Oral ACB    sertraline (ZOLOFT) tablet 50 mg  50 mg Oral DAILY    bumetanide (BUMEX) tablet 1 mg  1 mg Oral DAILY    sodium chloride (NS) flush 5-10 mL  5-10 mL IntraVENous Q8H    sodium chloride (NS) flush 5-10 mL  5-10 mL IntraVENous PRN    acetaminophen (TYLENOL) tablet 650 mg  650 mg Oral Q6H PRN    Or    acetaminophen (TYLENOL) suppository 650 mg  650 mg Rectal Q6H PRN    polyethylene glycol (MIRALAX) packet 17 g  17 g Oral DAILY    promethazine (PHENERGAN) tablet 12.5 mg  12.5 mg Oral Q6H PRN    Or    ondansetron (ZOFRAN) injection 4 mg  4 mg IntraVENous Q6H PRN    enoxaparin (LOVENOX) injection 40 mg  40 mg SubCUTAneous Q12H       Allergies   Allergen Reactions    Benzalkonium Chloride Other (comments)     Benzalkonium  Conjunctivitis sx    Gold Sodium Thiosulfate Itching    Nickel Itching    Benzalkonium Other (comments)     Caused pink eye       There is no immunization history on file for this patient. Recent Vital Data:  Patient Vitals for the past 24 hrs:   Temp Pulse Resp BP SpO2   03/10/22 1033 -- -- -- -- 95 %   03/10/22 1028 -- -- -- -- 95 %   03/10/22 0307 98.2 °F (36.8 °C) 71 20 115/60 97 %   03/09/22 2259 98.1 °F (36.7 °C) 75 20 (!) 127/55 94 %   03/09/22 2138 -- -- -- -- 96 %   03/09/22 1928 98.3 °F (36.8 °C) 69 20 (!) 122/46 97 %   03/09/22 1601 98.5 °F (36.9 °C) 73 20 (!) 107/44 92 %   03/09/22 1152 97.7 °F (36.5 °C) 71 20 (!) 116/51 97 %     Oxygen Therapy  O2 Sat (%): 95 % (03/10/22 1033)  Pulse via Oximetry: 81 beats per minute (03/10/22 1033)  O2 Device: None (Room air) (03/10/22 1033)  O2 Flow Rate (L/min): 0 l/min (03/10/22 1033)    Estimated body mass index is 44.63 kg/m² as calculated from the following:    Height as of this encounter: 5' 4\" (1.626 m). Weight as of this encounter: 117.9 kg (260 lb). Intake/Output Summary (Last 24 hours) at 3/10/2022 1058  Last data filed at 3/10/2022 1024  Gross per 24 hour   Intake 600 ml   Output 1150 ml   Net -550 ml   Physical Exam  Vitals and nursing note reviewed. Constitutional:       General: She is not in acute distress. Appearance: Normal appearance. She is obese. She is not ill-appearing. HENT:      Head: Normocephalic.    Eyes:      Extraocular Movements: Extraocular movements intact. Cardiovascular:      Rate and Rhythm: Normal rate and regular rhythm. Pulses:           Radial pulses are 2+ on the left side. Heart sounds: No murmur heard. Pulmonary:      Effort: Pulmonary effort is normal. No respiratory distress. Breath sounds: No wheezing. Musculoskeletal:         General: No deformity. Cervical back: No rigidity. Right lower leg: Edema present. Left lower leg: Edema present. Comments: Nonpitting lymphedema   Skin:     General: Skin is warm and dry. Comments: Mild hemosiderin deposition bilateral lower extremities   Neurological:      General: No focal deficit present. Mental Status: She is alert and oriented to person, place, and time.    Psychiatric:         Mood and Affect: Mood normal.         Behavior: Behavior normal.       Signed:  Abby Jones MD

## 2022-03-10 NOTE — PROGRESS NOTES
Mendel Haggis, patient daughter @ [phone] called this CM voicing her concerns over her mother going home today. States she is worried that she is dehydrated and that her potassium is low. Advised that I would let patient nurse, Vy Wade, know of her concerns. Agrees with this plan.

## 2022-03-10 NOTE — PROGRESS NOTES
Care Management Interventions  PCP Verified by CM: Yes  Mode of Transport at Discharge: Other (see comment)  Transition of Care Consult (CM Consult): Home Health  Physical Therapy Consult: Yes  Occupational Therapy Consult: Yes  Support Systems: Child(len)  Confirm Follow Up Transport: Family  The Patient and/or Patient Representative was Provided with a Choice of Provider and Agrees with the Discharge Plan?: Yes  Name of the Patient Representative Who was Provided with a Choice of Provider and Agrees with the Discharge Plan: Patient daughter  Freedom of Choice List was Provided with Basic Dialogue that Supports the Patient's Individualized Plan of Care/Goals, Treatment Preferences and Shares the Quality Data Associated with the Providers?: Yes  Discharge Location  Patient Expects to be Discharged to[de-identified] Home with home health    In to see patient. Patient lives with spouse but is basically his caregiver. Spouse has stage IV melanoma and patient has been caring for him for at least the last six months. Lives in [place] [place] with no steps to enter. Has daughter nearby and another that doesn't live in town, but is present in her life. States she uses [facility] for her meds and there was a mixup in getting her Lasix refilled so she had not taken it for approx a month. Also mentioned the costs of some of her meds being very expensive. Wrote down list of meds and looked up on the [facility] bruce and gave this info to her and her daughter along with a Good RX card. All meds were very reasonably price with the Norvasc being free at [facility]. Advised she can use card whether or not she has insurance. Symbicort noted being over $200 per inhaler so advised to check and see how much it is with her Medicare and . PT has recommended some PT at home upon discharge. Daughter deciding overnight what [facility] agency she wishes to provide care. Patient choice sheet given. Patient also wishing to change cardiologists.  Would like to go with the cardiologist she saw this morning. Perfectserved Dr. Valentina Canchola to see if he would accept. States he would and to schedule a followup appt with him or someone in the group in 2 weeks. Advised patient/daughter. Order placed for new cardiologist and for [facility] PT/OT. Questions answered. CM following.

## 2022-03-12 ENCOUNTER — HOME CARE VISIT (OUTPATIENT)
Dept: SCHEDULING | Facility: HOME HEALTH | Age: 82
End: 2022-03-12

## 2022-03-12 ENCOUNTER — HOSPITAL ENCOUNTER (INPATIENT)
Age: 82
LOS: 1 days | Discharge: NURSING, REHAB OR LONG-TERM CARE | DRG: 641 | End: 2022-03-15
Attending: EMERGENCY MEDICINE | Admitting: FAMILY MEDICINE
Payer: MEDICARE

## 2022-03-12 ENCOUNTER — APPOINTMENT (OUTPATIENT)
Dept: GENERAL RADIOLOGY | Age: 82
DRG: 641 | End: 2022-03-12
Attending: EMERGENCY MEDICINE
Payer: MEDICARE

## 2022-03-12 DIAGNOSIS — I50.30 DIASTOLIC CONGESTIVE HEART FAILURE WITH PRESERVED LEFT VENTRICULAR FUNCTION, NYHA CLASS 2 (HCC): ICD-10-CM

## 2022-03-12 DIAGNOSIS — F39 MOOD DISORDER (HCC): ICD-10-CM

## 2022-03-12 DIAGNOSIS — R06.02 SOB (SHORTNESS OF BREATH): Primary | ICD-10-CM

## 2022-03-12 PROBLEM — R53.1 WEAKNESS: Status: ACTIVE | Noted: 2022-03-12

## 2022-03-12 LAB
ALBUMIN SERPL-MCNC: 3.1 G/DL (ref 3.2–4.6)
ALBUMIN/GLOB SERPL: 0.7 {RATIO} (ref 1.2–3.5)
ALP SERPL-CCNC: 87 U/L (ref 50–130)
ALT SERPL-CCNC: 24 U/L (ref 12–65)
ANION GAP SERPL CALC-SCNC: 8 MMOL/L (ref 7–16)
AST SERPL-CCNC: 25 U/L (ref 15–37)
BASOPHILS # BLD: 0.1 K/UL (ref 0–0.2)
BASOPHILS NFR BLD: 1 % (ref 0–2)
BILIRUB SERPL-MCNC: 0.5 MG/DL (ref 0.2–1.1)
BNP SERPL-MCNC: 527 PG/ML
BUN SERPL-MCNC: 30 MG/DL (ref 8–23)
CALCIUM SERPL-MCNC: 8.1 MG/DL (ref 8.3–10.4)
CHLORIDE SERPL-SCNC: 93 MMOL/L (ref 98–107)
CO2 SERPL-SCNC: 33 MMOL/L (ref 21–32)
CREAT SERPL-MCNC: 0.9 MG/DL (ref 0.6–1)
DIFFERENTIAL METHOD BLD: ABNORMAL
EOSINOPHIL # BLD: 0.6 K/UL (ref 0–0.8)
EOSINOPHIL NFR BLD: 6 % (ref 0.5–7.8)
ERYTHROCYTE [DISTWIDTH] IN BLOOD BY AUTOMATED COUNT: 17.2 % (ref 11.9–14.6)
GLOBULIN SER CALC-MCNC: 4.4 G/DL (ref 2.3–3.5)
GLUCOSE SERPL-MCNC: 76 MG/DL (ref 65–100)
HCT VFR BLD AUTO: 34.9 % (ref 35.8–46.3)
HGB BLD-MCNC: 10.7 G/DL (ref 11.7–15.4)
IMM GRANULOCYTES # BLD AUTO: 0 K/UL (ref 0–0.5)
IMM GRANULOCYTES NFR BLD AUTO: 0 % (ref 0–5)
LIPASE SERPL-CCNC: 62 U/L (ref 73–393)
LYMPHOCYTES # BLD: 2.1 K/UL (ref 0.5–4.6)
LYMPHOCYTES NFR BLD: 22 % (ref 13–44)
MAGNESIUM SERPL-MCNC: 1.5 MG/DL (ref 1.8–2.4)
MCH RBC QN AUTO: 24 PG (ref 26.1–32.9)
MCHC RBC AUTO-ENTMCNC: 30.7 G/DL (ref 31.4–35)
MCV RBC AUTO: 78.3 FL (ref 79.6–97.8)
MONOCYTES # BLD: 1 K/UL (ref 0.1–1.3)
MONOCYTES NFR BLD: 10 % (ref 4–12)
NEUTS SEG # BLD: 5.7 K/UL (ref 1.7–8.2)
NEUTS SEG NFR BLD: 61 % (ref 43–78)
NRBC # BLD: 0 K/UL (ref 0–0.2)
PLATELET # BLD AUTO: 292 K/UL (ref 150–450)
PMV BLD AUTO: 10.9 FL (ref 9.4–12.3)
POTASSIUM SERPL-SCNC: 3.1 MMOL/L (ref 3.5–5.1)
PROT SERPL-MCNC: 7.5 G/DL (ref 6.3–8.2)
RBC # BLD AUTO: 4.46 M/UL (ref 4.05–5.2)
SODIUM SERPL-SCNC: 134 MMOL/L (ref 136–145)
WBC # BLD AUTO: 9.4 K/UL (ref 4.3–11.1)

## 2022-03-12 PROCEDURE — 74011000250 HC RX REV CODE- 250: Performed by: FAMILY MEDICINE

## 2022-03-12 PROCEDURE — 83690 ASSAY OF LIPASE: CPT

## 2022-03-12 PROCEDURE — 80053 COMPREHEN METABOLIC PANEL: CPT

## 2022-03-12 PROCEDURE — 93005 ELECTROCARDIOGRAM TRACING: CPT | Performed by: EMERGENCY MEDICINE

## 2022-03-12 PROCEDURE — 99285 EMERGENCY DEPT VISIT HI MDM: CPT

## 2022-03-12 PROCEDURE — 83880 ASSAY OF NATRIURETIC PEPTIDE: CPT

## 2022-03-12 PROCEDURE — G0378 HOSPITAL OBSERVATION PER HR: HCPCS

## 2022-03-12 PROCEDURE — 83735 ASSAY OF MAGNESIUM: CPT

## 2022-03-12 PROCEDURE — 74011250637 HC RX REV CODE- 250/637: Performed by: FAMILY MEDICINE

## 2022-03-12 PROCEDURE — 96374 THER/PROPH/DIAG INJ IV PUSH: CPT

## 2022-03-12 PROCEDURE — 86580 TB INTRADERMAL TEST: CPT | Performed by: FAMILY MEDICINE

## 2022-03-12 PROCEDURE — 73502 X-RAY EXAM HIP UNI 2-3 VIEWS: CPT

## 2022-03-12 PROCEDURE — 71045 X-RAY EXAM CHEST 1 VIEW: CPT

## 2022-03-12 PROCEDURE — 94762 N-INVAS EAR/PLS OXIMTRY CONT: CPT

## 2022-03-12 PROCEDURE — 96372 THER/PROPH/DIAG INJ SC/IM: CPT

## 2022-03-12 PROCEDURE — 85025 COMPLETE CBC W/AUTO DIFF WBC: CPT

## 2022-03-12 PROCEDURE — 74011250636 HC RX REV CODE- 250/636: Performed by: FAMILY MEDICINE

## 2022-03-12 PROCEDURE — 74011000250 HC RX REV CODE- 250: Performed by: EMERGENCY MEDICINE

## 2022-03-12 RX ORDER — ALPRAZOLAM 0.5 MG/1
0.5 TABLET ORAL
Status: DISCONTINUED | OUTPATIENT
Start: 2022-03-12 | End: 2022-03-15 | Stop reason: HOSPADM

## 2022-03-12 RX ORDER — BUMETANIDE 1 MG/1
1 TABLET ORAL DAILY
Status: DISCONTINUED | OUTPATIENT
Start: 2022-03-13 | End: 2022-03-15 | Stop reason: HOSPADM

## 2022-03-12 RX ORDER — AMLODIPINE BESYLATE 10 MG/1
10 TABLET ORAL DAILY
Status: DISCONTINUED | OUTPATIENT
Start: 2022-03-13 | End: 2022-03-15 | Stop reason: HOSPADM

## 2022-03-12 RX ORDER — BUMETANIDE 0.25 MG/ML
0.5 INJECTION INTRAMUSCULAR; INTRAVENOUS ONCE
Status: COMPLETED | OUTPATIENT
Start: 2022-03-12 | End: 2022-03-12

## 2022-03-12 RX ORDER — PROMETHAZINE HYDROCHLORIDE 25 MG/1
12.5 TABLET ORAL
Status: DISCONTINUED | OUTPATIENT
Start: 2022-03-12 | End: 2022-03-15 | Stop reason: HOSPADM

## 2022-03-12 RX ORDER — ACETAMINOPHEN 325 MG/1
650 TABLET ORAL
Status: DISCONTINUED | OUTPATIENT
Start: 2022-03-12 | End: 2022-03-15 | Stop reason: HOSPADM

## 2022-03-12 RX ORDER — PRAVASTATIN SODIUM 20 MG/1
40 TABLET ORAL
Status: DISCONTINUED | OUTPATIENT
Start: 2022-03-12 | End: 2022-03-15 | Stop reason: HOSPADM

## 2022-03-12 RX ORDER — SODIUM CHLORIDE 0.9 % (FLUSH) 0.9 %
5-10 SYRINGE (ML) INJECTION EVERY 8 HOURS
Status: DISCONTINUED | OUTPATIENT
Start: 2022-03-12 | End: 2022-03-15 | Stop reason: HOSPADM

## 2022-03-12 RX ORDER — GUAIFENESIN 600 MG/1
600 TABLET, EXTENDED RELEASE ORAL EVERY 12 HOURS
Status: DISCONTINUED | OUTPATIENT
Start: 2022-03-12 | End: 2022-03-15 | Stop reason: HOSPADM

## 2022-03-12 RX ORDER — ONDANSETRON 2 MG/ML
4 INJECTION INTRAMUSCULAR; INTRAVENOUS
Status: DISCONTINUED | OUTPATIENT
Start: 2022-03-12 | End: 2022-03-15 | Stop reason: HOSPADM

## 2022-03-12 RX ORDER — ACETAMINOPHEN 650 MG/1
650 SUPPOSITORY RECTAL
Status: DISCONTINUED | OUTPATIENT
Start: 2022-03-12 | End: 2022-03-15 | Stop reason: HOSPADM

## 2022-03-12 RX ORDER — BUDESONIDE AND FORMOTEROL FUMARATE DIHYDRATE 160; 4.5 UG/1; UG/1
2 AEROSOL RESPIRATORY (INHALATION)
Status: DISCONTINUED | OUTPATIENT
Start: 2022-03-12 | End: 2022-03-15 | Stop reason: HOSPADM

## 2022-03-12 RX ORDER — CALCIUM CARBONATE 200(500)MG
200 TABLET,CHEWABLE ORAL DAILY
Status: DISCONTINUED | OUTPATIENT
Start: 2022-03-13 | End: 2022-03-15 | Stop reason: HOSPADM

## 2022-03-12 RX ORDER — GUAIFENESIN 100 MG/5ML
81 LIQUID (ML) ORAL DAILY
Status: DISCONTINUED | OUTPATIENT
Start: 2022-03-13 | End: 2022-03-15 | Stop reason: HOSPADM

## 2022-03-12 RX ORDER — SODIUM CHLORIDE 0.9 % (FLUSH) 0.9 %
5-10 SYRINGE (ML) INJECTION AS NEEDED
Status: DISCONTINUED | OUTPATIENT
Start: 2022-03-12 | End: 2022-03-15 | Stop reason: HOSPADM

## 2022-03-12 RX ORDER — SERTRALINE HYDROCHLORIDE 50 MG/1
50 TABLET, FILM COATED ORAL DAILY
Status: DISCONTINUED | OUTPATIENT
Start: 2022-03-13 | End: 2022-03-15 | Stop reason: HOSPADM

## 2022-03-12 RX ORDER — POLYETHYLENE GLYCOL 3350 17 G/17G
17 POWDER, FOR SOLUTION ORAL DAILY
Status: DISCONTINUED | OUTPATIENT
Start: 2022-03-13 | End: 2022-03-15 | Stop reason: HOSPADM

## 2022-03-12 RX ORDER — PANTOPRAZOLE SODIUM 40 MG/1
40 TABLET, DELAYED RELEASE ORAL
Status: DISCONTINUED | OUTPATIENT
Start: 2022-03-13 | End: 2022-03-15 | Stop reason: HOSPADM

## 2022-03-12 RX ORDER — ENOXAPARIN SODIUM 100 MG/ML
40 INJECTION SUBCUTANEOUS EVERY 12 HOURS
Status: DISCONTINUED | OUTPATIENT
Start: 2022-03-12 | End: 2022-03-15 | Stop reason: HOSPADM

## 2022-03-12 RX ADMIN — GUAIFENESIN 600 MG: 600 TABLET, EXTENDED RELEASE ORAL at 23:06

## 2022-03-12 RX ADMIN — BUMETANIDE 0.5 MG: 0.25 INJECTION, SOLUTION INTRAMUSCULAR; INTRAVENOUS at 20:20

## 2022-03-12 RX ADMIN — PRAVASTATIN SODIUM 40 MG: 20 TABLET ORAL at 23:06

## 2022-03-12 RX ADMIN — TUBERCULIN PURIFIED PROTEIN DERIVATIVE 5 UNITS: 5 INJECTION, SOLUTION INTRADERMAL at 23:07

## 2022-03-12 RX ADMIN — ENOXAPARIN SODIUM 40 MG: 100 INJECTION SUBCUTANEOUS at 23:09

## 2022-03-12 RX ADMIN — PROMETHAZINE HYDROCHLORIDE 12.5 MG: 25 TABLET ORAL at 23:05

## 2022-03-12 RX ADMIN — ALPRAZOLAM 0.5 MG: 0.5 TABLET ORAL at 23:06

## 2022-03-12 RX ADMIN — SODIUM CHLORIDE, PRESERVATIVE FREE 10 ML: 5 INJECTION INTRAVENOUS at 22:00

## 2022-03-12 NOTE — ED PROVIDER NOTES
77-year-old female has a history of heart failure with preserved ejection fraction hypertension sleep apnea. Patient was hospitalized for some acute kidney injury and exacerbation of congestive failure on March 8. States 48 hours. She was on CPAP previously but went home on oxygen, 3 L around-the-clock. Review of the records for discharge summary also reveals the patient was changed to Bumex from Lasix. Her Norvasc was stopped. Patient states that shortness of breath seems to progress since she got home from the hospital.  She had a couple episodes of vomiting yesterday. Slight diarrhea. But worsening dyspnea on exertion. She gets around with a walker. No change in swelling in her legs. Brought in by EMS. States a lot of fatigue. Has not noticed any fever chills or dysuria. The history is provided by the patient. Shortness of Breath  The problem occurs continuously. The current episode started more than 1 week ago. The problem has been gradually worsening. Associated symptoms include cough, vomiting and leg swelling. Pertinent negatives include no fever, no sputum production, no hemoptysis, no wheezing, no chest pain, no syncope, no rash and no leg pain. She has had prior hospitalizations. Associated medical issues include chronic lung disease and heart failure. Associated medical issues do not include asthma, COPD, PE, DVT or recent surgery.         Past Medical History:   Diagnosis Date    Acute on chronic diastolic congestive heart failure ([address] 6/29/2021    JOHN (acute kidney injury) ([facility] [address] 3/8/2022    COPD (chronic obstructive pulmonary disease) (HCC)     Diastolic heart failure (HCC)     DVT (deep vein thrombosis) in pregnancy     Fibromuscular dysplasia of bilateral renal arteries (HCC)     GERD (gastroesophageal reflux disease)     HLD (hyperlipidemia)     HTN (hypertension)     CAREN on CPAP     Osteoarthritis     Renal artery stenosis (HCC)     s/p bilateral angioplasty 2/28/2013 No past surgical history on file. Family History:   Problem Relation Age of Onset    Hypertension Mother     Hypertension Father        Social History     Socioeconomic History    Marital status:      Spouse name: Not on file    Number of children: Not on file    Years of education: Not on file    Highest education level: Not on file   Occupational History    Not on file   Tobacco Use    Smoking status: Never Smoker    Smokeless tobacco: Never Used   Substance and Sexual Activity    Alcohol use: Not on file    Drug use: Not on file    Sexual activity: Not on file   Other Topics Concern     Service Not Asked    Blood Transfusions Not Asked    Caffeine Concern Not Asked    Occupational Exposure Not Asked    Hobby Hazards Not Asked    Sleep Concern Not Asked    Stress Concern Not Asked    Weight Concern Not Asked    Special Diet Not Asked    Back Care Not Asked    Exercise Not Asked    Bike Helmet Not Asked   [address] Not Asked    Self-Exams Not Asked   Social History Narrative    Not on file     Social Determinants of Health     Financial Resource Strain:     Difficulty of Paying Living Expenses: Not on file   Food Insecurity:     Worried About Running Out of Food in the Last Year: Not on file    Edilberto of Food in the Last Year: Not on file   Transportation Needs:     Lack of Transportation (Medical): Not on file    Lack of Transportation (Non-Medical):  Not on file   Physical Activity:     Days of Exercise per Week: Not on file    Minutes of Exercise per Session: Not on file   Stress:     Feeling of Stress : Not on file   Social Connections:     Frequency of Communication with Friends and Family: Not on file    Frequency of Social Gatherings with Friends and Family: Not on file    Attends Church Services: Not on file    Active Member of Clubs or Organizations: Not on file    Attends Club or Organization Meetings: Not on file    Marital Status: Not on file   Intimate Partner Violence:     Fear of Current or Ex-Partner: Not on file    Emotionally Abused: Not on file    Physically Abused: Not on file    Sexually Abused: Not on file   Housing Stability:     Unable to Pay for Housing in the Last Year: Not on file    Number of [place] in the Last Year: Not on file    Unstable Housing in the Last Year: Not on file         ALLERGIES: Benzalkonium chloride, Gold sodium thiosulfate, Nickel, and Benzalkonium    Review of Systems   Constitutional: Negative for chills and fever. Respiratory: Positive for cough and shortness of breath. Negative for hemoptysis, sputum production and wheezing. Cardiovascular: Positive for leg swelling. Negative for chest pain and syncope. Gastrointestinal: Positive for vomiting. Skin: Negative for rash. All other systems reviewed and are negative. Vitals:    03/12/22 1827   BP: (!) 147/65   Pulse: 74   Resp: 20   Temp: 98.1 °F (36.7 °C)   SpO2: 99%   Weight: 111.6 kg (246 lb)   Height: 5' 5\" (1.651 m)            Physical Exam  Vitals and nursing note reviewed. Constitutional:       General: She is not in acute distress. Appearance: She is well-developed. HENT:      Head: Normocephalic and atraumatic. Right Ear: External ear normal.      Left Ear: External ear normal.      Mouth/Throat:      Pharynx: No oropharyngeal exudate. Eyes:      Extraocular Movements: Extraocular movements intact. Conjunctiva/sclera: Conjunctivae normal.      Pupils: Pupils are equal, round, and reactive to light. Cardiovascular:      Rate and Rhythm: Normal rate and regular rhythm. Heart sounds: No murmur heard. Pulmonary:      Effort: No respiratory distress. Breath sounds: Normal breath sounds. Abdominal:      General: Bowel sounds are normal.      Palpations: Abdomen is soft. There is no mass. Tenderness: There is no abdominal tenderness. There is no guarding or rebound.       Hernia: No hernia is present. Musculoskeletal:      Right lower leg: Tenderness present. Edema present. Left lower leg: Tenderness present. Edema present. Comments: Mild tenderness and trace edema. Skin:     General: Skin is warm and dry. Neurological:      Mental Status: She is alert and oriented to person, place, and time. Gait: Gait normal.      Comments: Nl speech   Psychiatric:         Speech: Speech normal.          MDM  Number of Diagnoses or Management Options  Diagnosis management comments: Worsening shortness of breath. Assess for pneumonia, effusion. Chest x-ray and EKG. Check for worsening renal function and electrolyte abnormalities. Amount and/or Complexity of Data Reviewed  Clinical lab tests: ordered and reviewed  Tests in the radiology section of CPT®: ordered and reviewed  Tests in the medicine section of CPT®: ordered and reviewed  Review and summarize past medical records: yes  Independent visualization of images, tracings, or specimens: yes    Risk of Complications, Morbidity, and/or Mortality  Presenting problems: moderate  Diagnostic procedures: minimal  Management options: low    Patient Progress  Patient progress: stable         Procedures      Results Include:    Recent Results (from the past 24 hour(s))   CBC WITH AUTOMATED DIFF    Collection Time: 03/12/22  6:47 PM   Result Value Ref Range    WBC 9.4 4.3 - 11.1 K/uL    RBC 4.46 4.05 - 5.2 M/uL    HGB 10.7 (L) 11.7 - 15.4 g/dL    HCT 34.9 (L) 35.8 - 46.3 %    MCV 78.3 (L) 79.6 - 97.8 FL    MCH 24.0 (L) 26.1 - 32.9 PG    MCHC 30.7 (L) 31.4 - 35.0 g/dL    RDW 17.2 (H) 11.9 - 14.6 %    PLATELET 393 945 - 027 K/uL    MPV 10.9 9.4 - 12.3 FL    ABSOLUTE NRBC 0.00 0.0 - 0.2 K/uL    DF AUTOMATED      NEUTROPHILS 61 43 - 78 %    LYMPHOCYTES 22 13 - 44 %    MONOCYTES 10 4.0 - 12.0 %    EOSINOPHILS 6 0.5 - 7.8 %    BASOPHILS 1 0.0 - 2.0 %    IMMATURE GRANULOCYTES 0 0.0 - 5.0 %    ABS. NEUTROPHILS 5.7 1.7 - 8.2 K/UL    ABS. LYMPHOCYTES 2.1 0.5 - 4.6 K/UL    ABS. MONOCYTES 1.0 0.1 - 1.3 K/UL    ABS. EOSINOPHILS 0.6 0.0 - 0.8 K/UL    ABS. BASOPHILS 0.1 0.0 - 0.2 K/UL    ABS. IMM. GRANS. 0.0 0.0 - 0.5 K/UL   METABOLIC PANEL, COMPREHENSIVE    Collection Time: 03/12/22  6:47 PM   Result Value Ref Range    Sodium 134 (L) 136 - 145 mmol/L    Potassium 3.1 (L) 3.5 - 5.1 mmol/L    Chloride 93 (L) 98 - 107 mmol/L    CO2 33 (H) 21 - 32 mmol/L    Anion gap 8 7 - 16 mmol/L    Glucose 76 65 - 100 mg/dL    BUN 30 (H) 8 - 23 MG/DL    Creatinine 0.90 0.6 - 1.0 MG/DL    GFR est AA >60 >60 ml/min/1.73m2    GFR est non-AA >60 >60 ml/min/1.73m2    Calcium 8.1 (L) 8.3 - 10.4 MG/DL    Bilirubin, total 0.5 0.2 - 1.1 MG/DL    ALT (SGPT) 24 12 - 65 U/L    AST (SGOT) 25 15 - 37 U/L    Alk. phosphatase 87 50 - 130 U/L    Protein, total 7.5 6.3 - 8.2 g/dL    Albumin 3.1 (L) 3.2 - 4.6 g/dL    Globulin 4.4 (H) 2.3 - 3.5 g/dL    A-G Ratio 0.7 (L) 1.2 - 3.5     MAGNESIUM    Collection Time: 03/12/22  6:47 PM   Result Value Ref Range    Magnesium 1.5 (L) 1.8 - 2.4 mg/dL   NT-PRO BNP    Collection Time: 03/12/22  6:47 PM   Result Value Ref Range    NT pro- (H) <450 PG/ML   LIPASE    Collection Time: 03/12/22  6:47 PM   Result Value Ref Range    Lipase 62 (L) 73 - 393 U/L     XR CHEST PORT    Result Date: 3/12/2022  EXAMINATION: XR CHEST PORT 3/12/2022 7:03 PM ACCESSION NUMBER: [id number] COMPARISON: 3/8/2022 INDICATION: Shortness of Breath TECHNIQUE: A single view of the chest was obtained. FINDINGS: Support Devices: *  None Cardiac Silhouette: The axilla is enlarged, unchanged. Mediastinum: Aortic arch calcifications. Lungs: No airspace consolidation. No pneumothorax or sizable pleural effusion. Upper Abdomen: Normal Miscellaneous: No fracture or suspicious osseous lesion. Stable cardiomegaly without focal airspace consolidation. 8:26 PM  Patient ambulated with walker and did not drop O2 sat below 94%. Some urine output.      9:08 PM  Since daughter arrives. States patient too weak to take more than just a few steps at home. Family not able to take care of her. She cannot take care of herself. [facility] home health nurse was out for assessment and felt the patient was not able to care for self at home. Reexamination, some pain in the right upper quadrant with palpation. Given history of vomiting will check ultrasound for gallstones. Also patient has some slight right hip pain ever since a fall a few weeks ago. Will get hip films as well.

## 2022-03-12 NOTE — ED TRIAGE NOTES
Pt arrived to ED via EMS from home. Per EMS home health nurse was at the home, pt wears 3L O2 at all times. Pt c/o shortness pf breath x2 weeks with worsening over past two days.  Pt states she recently stopped taking Lasix and is now taking Bumex     18gauge left hand    112/74  80  97.8

## 2022-03-13 ENCOUNTER — APPOINTMENT (OUTPATIENT)
Dept: ULTRASOUND IMAGING | Age: 82
DRG: 641 | End: 2022-03-13
Attending: EMERGENCY MEDICINE
Payer: MEDICARE
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
## 2022-03-13 LAB
ANION GAP SERPL CALC-SCNC: 6 MMOL/L (ref 7–16)
ATRIAL RATE: 75 BPM
BASOPHILS # BLD: 0.1 K/UL (ref 0–0.2)
BASOPHILS NFR BLD: 1 % (ref 0–2)
BUN SERPL-MCNC: 30 MG/DL (ref 8–23)
CALCIUM SERPL-MCNC: 9.2 MG/DL (ref 8.3–10.4)
CALCULATED P AXIS, ECG09: 71 DEGREES
CALCULATED R AXIS, ECG10: 17 DEGREES
CALCULATED T AXIS, ECG11: 55 DEGREES
CHLORIDE SERPL-SCNC: 95 MMOL/L (ref 98–107)
CO2 SERPL-SCNC: 33 MMOL/L (ref 21–32)
CREAT SERPL-MCNC: 0.97 MG/DL (ref 0.6–1)
DIAGNOSIS, 93000: NORMAL
DIFFERENTIAL METHOD BLD: ABNORMAL
EOSINOPHIL # BLD: 0.6 K/UL (ref 0–0.8)
EOSINOPHIL NFR BLD: 8 % (ref 0.5–7.8)
ERYTHROCYTE [DISTWIDTH] IN BLOOD BY AUTOMATED COUNT: 17.1 % (ref 11.9–14.6)
GLUCOSE SERPL-MCNC: 139 MG/DL (ref 65–100)
HCT VFR BLD AUTO: 33.9 % (ref 35.8–46.3)
HGB BLD-MCNC: 10.3 G/DL (ref 11.7–15.4)
IMM GRANULOCYTES # BLD AUTO: 0 K/UL (ref 0–0.5)
IMM GRANULOCYTES NFR BLD AUTO: 0 % (ref 0–5)
LYMPHOCYTES # BLD: 2.2 K/UL (ref 0.5–4.6)
LYMPHOCYTES NFR BLD: 27 % (ref 13–44)
MAGNESIUM SERPL-MCNC: 0.8 MG/DL (ref 1.8–2.4)
MCH RBC QN AUTO: 23.9 PG (ref 26.1–32.9)
MCHC RBC AUTO-ENTMCNC: 30.4 G/DL (ref 31.4–35)
MCV RBC AUTO: 78.7 FL (ref 79.6–97.8)
MONOCYTES # BLD: 0.8 K/UL (ref 0.1–1.3)
MONOCYTES NFR BLD: 10 % (ref 4–12)
NEUTS SEG # BLD: 4.2 K/UL (ref 1.7–8.2)
NEUTS SEG NFR BLD: 53 % (ref 43–78)
NRBC # BLD: 0 K/UL (ref 0–0.2)
P-R INTERVAL, ECG05: 198 MS
PLATELET # BLD AUTO: 292 K/UL (ref 150–450)
PMV BLD AUTO: 11.1 FL (ref 9.4–12.3)
POTASSIUM SERPL-SCNC: 3 MMOL/L (ref 3.5–5.1)
Q-T INTERVAL, ECG07: 408 MS
QRS DURATION, ECG06: 100 MS
QTC CALCULATION (BEZET), ECG08: 455 MS
RBC # BLD AUTO: 4.31 M/UL (ref 4.05–5.2)
SODIUM SERPL-SCNC: 134 MMOL/L (ref 136–145)
VENTRICULAR RATE, ECG03: 75 BPM
WBC # BLD AUTO: 7.9 K/UL (ref 4.3–11.1)

## 2022-03-13 PROCEDURE — 36415 COLL VENOUS BLD VENIPUNCTURE: CPT

## 2022-03-13 PROCEDURE — 97161 PT EVAL LOW COMPLEX 20 MIN: CPT

## 2022-03-13 PROCEDURE — 92610 EVALUATE SWALLOWING FUNCTION: CPT

## 2022-03-13 PROCEDURE — 94640 AIRWAY INHALATION TREATMENT: CPT

## 2022-03-13 PROCEDURE — 76705 ECHO EXAM OF ABDOMEN: CPT

## 2022-03-13 PROCEDURE — 74011250636 HC RX REV CODE- 250/636: Performed by: FAMILY MEDICINE

## 2022-03-13 PROCEDURE — 77010033678 HC OXYGEN DAILY

## 2022-03-13 PROCEDURE — 83735 ASSAY OF MAGNESIUM: CPT

## 2022-03-13 PROCEDURE — 97530 THERAPEUTIC ACTIVITIES: CPT

## 2022-03-13 PROCEDURE — G0378 HOSPITAL OBSERVATION PER HR: HCPCS

## 2022-03-13 PROCEDURE — 94664 DEMO&/EVAL PT USE INHALER: CPT

## 2022-03-13 PROCEDURE — 97165 OT EVAL LOW COMPLEX 30 MIN: CPT

## 2022-03-13 PROCEDURE — 96372 THER/PROPH/DIAG INJ SC/IM: CPT

## 2022-03-13 PROCEDURE — 94760 N-INVAS EAR/PLS OXIMETRY 1: CPT

## 2022-03-13 PROCEDURE — 74011250637 HC RX REV CODE- 250/637: Performed by: FAMILY MEDICINE

## 2022-03-13 PROCEDURE — 80048 BASIC METABOLIC PNL TOTAL CA: CPT

## 2022-03-13 PROCEDURE — 85025 COMPLETE CBC W/AUTO DIFF WBC: CPT

## 2022-03-13 PROCEDURE — 97535 SELF CARE MNGMENT TRAINING: CPT

## 2022-03-13 RX ORDER — POTASSIUM CHLORIDE 20 MEQ/1
40 TABLET, EXTENDED RELEASE ORAL 2 TIMES DAILY
Status: COMPLETED | OUTPATIENT
Start: 2022-03-13 | End: 2022-03-13

## 2022-03-13 RX ORDER — MAGNESIUM SULFATE HEPTAHYDRATE 40 MG/ML
2 INJECTION, SOLUTION INTRAVENOUS ONCE
Status: ACTIVE | OUTPATIENT
Start: 2022-03-13 | End: 2022-03-14

## 2022-03-13 RX ORDER — TRAZODONE HYDROCHLORIDE 50 MG/1
50 TABLET ORAL
Status: DISCONTINUED | OUTPATIENT
Start: 2022-03-13 | End: 2022-03-14

## 2022-03-13 RX ADMIN — TRAZODONE HYDROCHLORIDE 50 MG: 50 TABLET ORAL at 22:10

## 2022-03-13 RX ADMIN — BUDESONIDE AND FORMOTEROL FUMARATE DIHYDRATE 2 PUFF: 160; 4.5 AEROSOL RESPIRATORY (INHALATION) at 08:35

## 2022-03-13 RX ADMIN — POTASSIUM CHLORIDE 40 MEQ: 20 TABLET, EXTENDED RELEASE ORAL at 18:08

## 2022-03-13 RX ADMIN — ALPRAZOLAM 0.5 MG: 0.5 TABLET ORAL at 22:09

## 2022-03-13 RX ADMIN — PRAVASTATIN SODIUM 40 MG: 20 TABLET ORAL at 22:09

## 2022-03-13 RX ADMIN — AMLODIPINE BESYLATE 10 MG: 10 TABLET ORAL at 08:46

## 2022-03-13 RX ADMIN — POTASSIUM CHLORIDE 40 MEQ: 20 TABLET, EXTENDED RELEASE ORAL at 08:46

## 2022-03-13 RX ADMIN — ENOXAPARIN SODIUM 40 MG: 100 INJECTION SUBCUTANEOUS at 22:10

## 2022-03-13 RX ADMIN — ASPIRIN 81 MG: 81 TABLET, CHEWABLE ORAL at 08:45

## 2022-03-13 RX ADMIN — CALCIUM CARBONATE 200 MG: 500 TABLET, CHEWABLE ORAL at 08:46

## 2022-03-13 RX ADMIN — SERTRALINE 50 MG: 50 TABLET, FILM COATED ORAL at 08:45

## 2022-03-13 RX ADMIN — GUAIFENESIN 600 MG: 600 TABLET, EXTENDED RELEASE ORAL at 22:09

## 2022-03-13 RX ADMIN — GUAIFENESIN 600 MG: 600 TABLET, EXTENDED RELEASE ORAL at 08:46

## 2022-03-13 RX ADMIN — ENOXAPARIN SODIUM 40 MG: 100 INJECTION SUBCUTANEOUS at 08:45

## 2022-03-13 RX ADMIN — BUMETANIDE 1 MG: 1 TABLET ORAL at 08:46

## 2022-03-13 RX ADMIN — BUDESONIDE AND FORMOTEROL FUMARATE DIHYDRATE 2 PUFF: 160; 4.5 AEROSOL RESPIRATORY (INHALATION) at 21:05

## 2022-03-13 NOTE — ASSESSMENT & PLAN NOTE
Easily fatigued, No focal deficits,  Likely somewhat deconditioned given recent hospitalized  - Patient had wished to go home 2 days ago upon discharge, but may benefit from [facility]  - PT/OT consulted  - PPD ordered  - Consult to Case Management

## 2022-03-13 NOTE — H&P
Hospitalist History and Physical   Admit Date:  3/12/2022  6:26 PM   Name:  Christ Hair   Age:  80 y.o. Sex:  female  :  1940   MRN:  [id number]     Presenting Complaint: weakness, PALACIOS  Reason(s) for Admission: Weakness [R53.1]     History of Present Illness:   Christ Hair is a 80 y.o. female with medical history of CHF, HTN, COPD, and recent hospitalization for CHF exacerbation who presented to ED with weakness. Patient was discharged to home on 3/10/22 with home health. [facility] RN told her and family that she appeared ill and needed to go back to the hospital.  She reports continued SOB, however she is not hypoxic, even on ambulation in the ER. No worsening edema. Lab work is stable. No fevers. Given weakness and potential need for STR, Hospitalist consulted for admission. Patient mentions a few episodes of vomiting at home, and continued R hip pain after a fall 3 weeks ago, so ER has pending XR and US. Review of Systems:  10 systems reviewed and negative except as noted in HPI. Assessment & Plan:   * Weakness  - Easily fatigued  - No focal deficits  - Likely somewhat deconditioned given recent hospitalized  - Patient had wished to go home 2 days ago upon discharge, but may benefit from STR  - PT/OT consulted  - PPD ordered  - Consult to Case Management    Chronic diastolic heart failure ([facility] [address]  - Not in exacerbation  - Continue home meds as prescribed upon discharge on 3/10  - Stable on home 3lpm    Mild persistent asthma without complication  - Home meds  - Stable on home O2    CAREN (obstructive sleep apnea)  - CPAP qhs    Lymphedema of both lower extremities  - Chronic, not worsened since discharge     Of note, patient's daughter also incidentally mentions that the patient feels like she has things \"stuck\" in her throat at times, possibly causing the vomiting. Will consult with Speech Therapy for evaluation.     Disposition: observation    Diet: prior  VTE ppx: SCDs  Code status: Prior      Past medical history reviewed. 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 Allergies   Allergen Reactions    Benzalkonium Chloride Other (comments)     Benzalkonium  Conjunctivitis sx    Gold Sodium Thiosulfate Itching    Nickel Itching    Benzalkonium Other (comments)     Caused pink eye      Social History     Tobacco Use    Smoking status: Never Smoker    Smokeless tobacco: Never Used   Substance Use Topics    Alcohol use: Not on file      Family History   Problem Relation Age of Onset    Hypertension Mother     Hypertension Father       Family history reviewed and noncontributory to patient's acute condition; no relevant family history unless otherwise noted above. There is no immunization history on file for this patient.   PTA Medications:  Current Outpatient Medications   Medication Instructions    ALPRAZolam (XANAX) 0.5 mg tablet Oral, AS NEEDED    aspirin 81 mg, Oral, DAILY    bumetanide (BUMEX) 1 mg, Oral, DAILY    calcium carbonate (TUMS) 200 mg calcium (500 mg) chew 1 Tablet, Oral, DAILY    calcium-cholecalciferol, d3, (CALCIUM 600 + D) 600-125 mg-unit tab Oral    celecoxib (CeleBREX) 200 mg capsule Oral, 2 TIMES DAILY    clotrimazole-betamethasone (LOTRISONE) topical cream Topical, AS NEEDED    fluticasone propion-salmeteroL (Advair HFA) 115-21 mcg/actuation inhaler 2 Puffs, Inhalation, 2 TIMES DAILY    fluticasone propionate (FLONASE) 50 mcg/actuation nasal spray 2 Sprays, Both Nostrils, AS NEEDED    guaiFENesin ER (MUCINEX) 600 mg, Oral, 2 TIMES DAILY    guaiFENesin-dextromethorphan (ROBITUSSIN DM) 100-10 mg/5 mL syrup 5 mL, Oral, 3 TIMES DAILY AS NEEDED    HYDROcodone-acetaminophen (NORCO)  mg tablet 1 Tablet, Oral, EVERY BEDTIME    lisinopriL (PRINIVIL, ZESTRIL) 2.5 mg, Oral, DAILY    metaxalone (Skelaxin) 800 mg tablet Oral, 2 TIMES DAILY    metoprolol succinate (TOPROL-XL) 12.5 mg, Oral, DAILY    polyethylene glycol (MIRALAX) 17 g, Oral, AS NEEDED    potassium 99 mg, Oral, DAILY    pravastatin (PRAVACHOL) 40 mg, Oral, EVERY BEDTIME    RABEprazole (ACIPHEX) 20 mg, Oral, DAILY    sertraline (ZOLOFT) 50 mg tablet Oral, AS NEEDED    triamcinolone acetonide (KENALOG) 0.1 % ointment Topical, AS NEEDED, use thin layer     white pet-mineral oil-lanolin (AKWA TEARS) ophthalmic ointment Ophthalmic, AS NEEDED       Objective:     Patient Vitals for the past 24 hrs:   Temp Pulse Resp BP SpO2   03/12/22 2059  76 18 (!) 141/63 98 %   03/12/22 2020  72  (!) 138/104    03/12/22 2019  74 20 (!) 138/104 97 %   03/12/22 2000  93 25  98 %   03/12/22 1959  93 24  97 %   03/12/22 1958  95 (!) 37  96 %   03/12/22 1957  94 (!) 33  96 %   03/12/22 1827 98.1 °F (36.7 °C) 74 20 (!) 147/65 99 %     Oxygen Therapy  O2 Sat (%): 98 % (03/12/22 2059)  Pulse via Oximetry: 75 beats per minute (03/12/22 2059)  O2 Device: Nasal cannula (03/12/22 1827)  O2 Flow Rate (L/min): 3 l/min (03/12/22 1827)    Estimated body mass index is 40.94 kg/m² as calculated from the following:    Height as of this encounter: 5' 5\" (1.651 m). Weight as of this encounter: 111.6 kg (246 lb). No intake or output data in the 24 hours ending 03/12/22 2152      Physical Exam:  General:    Well nourished. No overt distress  Head:  Normocephalic, atraumatic  Eyes:  Sclerae appear normal.  Pupils equally round.     HENT:  Nares appear normal, no drainage. Moist mucous membranes  Neck:  No restricted ROM. Trachea midline  CV:   RRR. S1/S2 auscultated  Lungs:   CTAB. No wheezing, rhonchi, or rales. Appears even, unlabored  Abdomen: Bowel sounds present. Soft, nontender, nondistended. Extremities: Warm and dry. No cyanosis or clubbing. No edema. Skin:     No rashes. Normal turgor. Normal coloration  Neuro:  Cranial nerves II-XII grossly intact. Sensation intact  Psych:  Normal mood and affect. Alert and oriented x3    Data Ordered and Personally Reviewed:    Last 24hr Labs:  Recent Results (from the past 24 hour(s))   EKG, 12 LEAD, INITIAL    Collection Time: 03/12/22  6:28 PM   Result Value Ref Range    Ventricular Rate 75 BPM    Atrial Rate 75 BPM    P-R Interval 198 ms    QRS Duration 100 ms    Q-T Interval 408 ms    QTC Calculation (Bezet) 455 ms    Calculated P Axis 71 degrees    Calculated R Axis 17 degrees    Calculated T Axis 55 degrees    Diagnosis       Normal sinus rhythm  ST abnormality, possible digitalis effect  Abnormal ECG  When compared with ECG of 12-MAR-2022 16:29,  Borderline criteria for Lateral infarct are no longer Present  Nonspecific T wave abnormality has replaced inverted T waves in Anterior   leads     CBC WITH AUTOMATED DIFF    Collection Time: 03/12/22  6:47 PM   Result Value Ref Range    WBC 9.4 4.3 - 11.1 K/uL    RBC 4.46 4.05 - 5.2 M/uL    HGB 10.7 (L) 11.7 - 15.4 g/dL    HCT 34.9 (L) 35.8 - 46.3 %    MCV 78.3 (L) 79.6 - 97.8 FL    MCH 24.0 (L) 26.1 - 32.9 PG    MCHC 30.7 (L) 31.4 - 35.0 g/dL    RDW 17.2 (H) 11.9 - 14.6 %    PLATELET 206 500 - 232 K/uL    MPV 10.9 9.4 - 12.3 FL    ABSOLUTE NRBC 0.00 0.0 - 0.2 K/uL    DF AUTOMATED      NEUTROPHILS 61 43 - 78 %    LYMPHOCYTES 22 13 - 44 %    MONOCYTES 10 4.0 - 12.0 %    EOSINOPHILS 6 0.5 - 7.8 %    BASOPHILS 1 0.0 - 2.0 %    IMMATURE GRANULOCYTES 0 0.0 - 5.0 %    ABS. NEUTROPHILS 5.7 1.7 - 8.2 K/UL    ABS. LYMPHOCYTES 2.1 0.5 - 4.6 K/UL    ABS. MONOCYTES 1.0 0.1 - 1.3 K/UL    ABS. EOSINOPHILS 0.6 0.0 - 0.8 K/UL    ABS. BASOPHILS 0.1 0.0 - 0.2 K/UL    ABS. IMM. GRANS. 0.0 0.0 - 0.5 K/UL   METABOLIC PANEL, COMPREHENSIVE    Collection Time: 03/12/22  6:47 PM   Result Value Ref Range    Sodium 134 (L) 136 - 145 mmol/L    Potassium 3.1 (L) 3.5 - 5.1 mmol/L    Chloride 93 (L) 98 - 107 mmol/L    CO2 33 (H) 21 - 32 mmol/L    Anion gap 8 7 - 16 mmol/L    Glucose 76 65 - 100 mg/dL    BUN 30 (H) 8 - 23 MG/DL    Creatinine 0.90 0.6 - 1.0 MG/DL    GFR est AA >60 >60 ml/min/1.73m2    GFR est non-AA >60 >60 ml/min/1.73m2    Calcium 8.1 (L) 8.3 - 10.4 MG/DL    Bilirubin, total 0.5 0.2 - 1.1 MG/DL    ALT (SGPT) 24 12 - 65 U/L    AST (SGOT) 25 15 - 37 U/L    Alk. phosphatase 87 50 - 130 U/L    Protein, total 7.5 6.3 - 8.2 g/dL    Albumin 3.1 (L) 3.2 - 4.6 g/dL    Globulin 4.4 (H) 2.3 - 3.5 g/dL    A-G Ratio 0.7 (L) 1.2 - 3.5     MAGNESIUM    Collection Time: 03/12/22  6:47 PM   Result Value Ref Range    Magnesium 1.5 (L) 1.8 - 2.4 mg/dL   NT-PRO BNP    Collection Time: 03/12/22  6:47 PM   Result Value Ref Range    NT pro- (H) <450 PG/ML   LIPASE    Collection Time: 03/12/22  6:47 PM   Result Value Ref Range    Lipase 62 (L) 73 - 393 U/L       All Micro Results     None          Other Studies:  XR HIP RT W OR WO PELV 2-3 VWS    Result Date: 3/12/2022  EXAM: Pelvis and right hip x-rays. INDICATION: Pain, fall injury several weeks ago. COMPARISON: None. TECHNIQUE: A frontal view of the pelvis was supplemented with 2 dedicated views of the right hip joint. FINDINGS: No acute fracture or dislocation is seen. The pelvic ring is intact. 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 If there is continued concern for an occult hip fracture, MRI is a more sensitive study. No acute process.      XR CHEST PORT    Result Date: 3/12/2022  EXAMINATION: XR CHEST PORT 3/12/2022 7:03 PM ACCESSION NUMBER: [id number] COMPARISON: 3/8/2022 INDICATION: Shortness of Breath TECHNIQUE: A single view of the chest was obtained. FINDINGS: Support Devices: *  None Cardiac Silhouette: The axilla is enlarged, unchanged. Mediastinum: Aortic arch calcifications. Lungs: No airspace consolidation. No pneumothorax or sizable pleural effusion. Upper Abdomen: Normal Miscellaneous: No fracture or suspicious osseous lesion. Stable cardiomegaly without focal airspace consolidation.          Medications Administered     bumetanide (BUMEX) injection 0.5 mg     Admin Date  03/12/2022 Action  Given Dose  0.5 mg Route  IntraVENous Administered By  LASHELL Holliday                  Signed:  Lala Asher MD

## 2022-03-13 NOTE — PROGRESS NOTES
Problem: Falls - Risk of  Goal: *Absence of Falls  Description: Document Sofia Fothergill Fall Risk and appropriate interventions in the flowsheet. Outcome: Progressing Towards Goal  Note: Fall Risk Interventions:  Mobility Interventions: Utilize walker, cane, or other assistive device              Elimination Interventions: Call light in reach              Problem: Patient Education: Go to Patient Education Activity  Goal: Patient/Family Education  Outcome: Progressing Towards Goal     Problem: Pressure Injury - Risk of  Goal: *Prevention of pressure injury  Description: Document [place] Scale and appropriate interventions in the flowsheet. Outcome: Progressing Towards Goal  Note: Pressure Injury Interventions:       Moisture Interventions: Absorbent underpads    Activity Interventions: Pressure redistribution bed/mattress(bed type)    Mobility Interventions: HOB 30 degrees or less    Nutrition Interventions: Offer support with meals,snacks and hydration,Discuss nutritional consult with provider                     Problem: Patient Education: Go to Patient Education Activity  Goal: Patient/Family Education  Outcome: Progressing Towards Goal     Problem:  Activity Intolerance  Goal: *Oxygen saturation during activity within specified parameters  Outcome: Progressing Towards Goal  Goal: *Able to remain out of bed as prescribed  Outcome: Progressing Towards Goal     Problem: Patient Education: Go to Patient Education Activity  Goal: Patient/Family Education  Outcome: Progressing Towards Goal     Problem: Airway Clearance - Ineffective  Goal: *Patent airway  Outcome: Progressing Towards Goal  Goal: *Absence of airway secretions  Outcome: Progressing Towards Goal  Goal: *Able to cough effectively  Outcome: Progressing Towards Goal  Goal: *PALLIATIVE CARE:  Alleviation of secretions, cough and/or nasal congestion  Outcome: Progressing Towards Goal     Problem: Patient Education: Go to Patient Education Activity  Goal: Patient/Family Education  Outcome: Progressing Towards Goal

## 2022-03-13 NOTE — PROGRESS NOTES
Problem: Self Care Deficits Care Plan (Adult)  Goal: *Acute Goals and Plan of Care (Insert Text)  Outcome: Progressing Towards Goal  Note: 1. Patient will complete lower body bathing and dressing with SPV and adaptive equipment as needed. 2. Patient will complete toileting with INDEPENDENCE. 3. Patient will tolerate 40 minutes of OT treatment with 1-2 rest breaks to increase activity tolerance for ADLs. 4. Patient will complete functional transfers with SPV and adaptive equipment as needed. Timeframe: 7 visits       OCCUPATIONAL THERAPY: Initial Assessment, Daily Note, and AM 3/13/2022  OBSERVATION: OT Visit Days: 1  [place] / [place] / Product Type: Medicare /      NAME/AGE/GENDER: Marco Antonio Rodriguez is a 80 y.o. female   PRIMARY DIAGNOSIS:  Weakness [R53.1] Weakness Weakness        ICD-10: Treatment Diagnosis:    Generalized Muscle Weakness (M62.81)  Other lack of cordination (R27.8)  Difficulty in walking, Not elsewhere classified (R26.2)   Precautions/Allergies:     Benzalkonium chloride, Gold sodium thiosulfate, Nickel, and Benzalkonium      ASSESSMENT:     Ms. Michael Limon presents with increased weakness. She was recently discharged 3/10 to home but returned 3/12 due to increased shortness of breath, weakness and inability to manage at home. 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 She is functioning well below her baseline in activity tolerance, ADL performance and functional household mobility. Today, she requires additional time to work through ADL tasks and mobility with RW. She will benefit from continued skilled OT during hospital stay and further rehab at [facility] upon discharge.     This section established at most recent assessment   PROBLEM LIST (Impairments causing functional limitations):  Decreased Strength  Decreased ADL/Functional Activities  Decreased Transfer Abilities  Decreased Ambulation Ability/Technique  Decreased Activity Tolerance  Increased Shortness of Breath   INTERVENTIONS PLANNED: (Benefits and precautions of occupational therapy have been discussed with the patient.)  Activities of daily living training  Adaptive equipment training  Therapeutic activity  Therapeutic exercise     TREATMENT PLAN: Frequency/Duration: Follow patient 3x/week to address above goals. Rehabilitation Potential For Stated Goals: Good     REHAB RECOMMENDATIONS (at time of discharge pending progress):    Placement: It is my opinion, based on this patient's performance to date, that Ms. Sarah Almeida may benefit from intensive therapy at a [address] [place] [address] after discharge due to the functional deficits listed above that are likely to improve with skilled rehabilitation and concerns that he/she may be unsafe to be unsupervised at home due to increased need for assistance during ADL/IADL tasks . Equipment:   None at this time              OCCUPATIONAL PROFILE AND HISTORY:   History of Present Injury/Illness (Reason for Referral):  See H&P  Past Medical History/Comorbidities:   Ms. Sarah Almeida  has a past medical history of Acute on chronic diastolic congestive heart failure ([address] (6/29/2021), JOHN (acute kidney injury) ([address] (3/8/2022), COPD (chronic obstructive pulmonary disease) ([address] Diastolic heart failure ([address] DVT (deep vein thrombosis) in pregnancy, Fibromuscular dysplasia of bilateral renal arteries ([address] GERD (gastroesophageal reflux disease), HLD (hyperlipidemia), HTN (hypertension), CAREN on CPAP, Osteoarthritis, and Renal artery stenosis ([address] Ms. Sarah Almeida  has no past surgical history on file.   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     Number of Personal Factors/Comorbidities that affect the Plan of Care: Brief history (0):  LOW COMPLEXITY   ASSESSMENT OF OCCUPATIONAL PERFORMANCE[de-identified]   Activities of Daily Living:   Basic ADLs (From Assessment) Complex ADLs (From Assessment)   Feeding: Independent  Oral Facial Hygiene/Grooming: Stand-by assistance  Bathing: Minimum assistance  Upper Body Dressing: Setup  Lower Body Dressing: Minimum assistance  Toileting: Contact guard assistance     Grooming/Bathing/Dressing Activities of Daily Living     Cognitive Retraining  Safety/Judgement: Awareness of environment                 Functional Transfers  Bathroom Mobility: Contact guard assistance  Toilet Transfer : Contact guard assistance (BSC over standard commode)  Shower Transfer: Contact guard assistance     Bed/Mat Mobility  Supine to Sit: Moderate assistance; Additional time  Sit to Stand: Contact guard assistance  Stand to Sit: Contact guard assistance  Bed to Chair: Contact guard assistance; Additional time  Scooting: Contact guard assistance; Additional time     Most Recent Physical Functioning:   Gross Assessment:                  Posture:     Balance:  Sitting: Intact  Standing: Pull to stand; With support Bed Mobility:  Supine to Sit: Moderate assistance; Additional time  Scooting: Contact guard assistance; Additional time  Wheelchair Mobility:     Transfers:  Sit to Stand: Contact guard assistance  Stand to Sit: Contact guard assistance  Bed to Chair: Contact guard assistance; Additional time            Patient Vitals for the past 6 hrs:   BP SpO2 Pulse   03/13/22 0835 -- 92 % --   03/13/22 0837 (!) 152/69 -- 82       Mental Status  Neurologic State: Alert  Orientation Level: Oriented X4  Cognition: Appropriate decision making  Perception: Appears intact  Perseveration: No perseveration noted  Safety/Judgement: Awareness of environment                          Physical Skills Involved:  Balance  Strength  Activity Tolerance Cognitive Skills Affected (resulting in the inability to perform in a timely and safe manner):  [facility] Psychosocial Skills Affected:  Environmental Adaptation   Number of elements that affect the Plan of Care: 3-5:  MODERATE COMPLEXITY   CLINICAL DECISION MAKIN[address] [place] [address] AM-PAC 6 Clicks   Daily Activity Inpatient Short Form  How much help from another person does the patient currently need. .. Total A Lot A Little None   1. Putting on and taking off regular lower body clothing? [] 1   [x] 2   [] 3   [] 4   2. Bathing (including washing, rinsing, drying)? [] 1   [x] 2   [] 3   [] 4   3. Toileting, which includes using toilet, bedpan or urinal?   [] 1   [] 2   [x] 3   [] 4   4. Putting on and taking off regular upper body clothing? [] 1   [] 2   [] 3   [x] 4   5. Taking care of personal grooming such as brushing teeth? [] 1   [] 2   [] 3   [x] 4   6. Eating meals? [] 1   [] 2   [] 3   [x] 4   © , Trustees of [address] [place] [address], under license to [employer]. All rights reserved      Score:  Initial: 19 Most Recent: X (Date: -- )    Interpretation of Tool:  Represents activities that are increasingly more difficult (i.e. Bed mobility, Transfers, Gait). Medical Necessity:     Skilled intervention continues to be required due to the above deficits. Reason for Services/Other Comments:  Patient continues to require skilled intervention due to   Increased need for assistance during ADL tasks and functional mobility  .    Use of outcome tool(s) and clinical judgement create a POC that gives a: LOW COMPLEXITY         TREATMENT:   (In addition to Assessment/Re-Assessment sessions the following treatments were rendered)     Pre-treatment Symptoms/Complaints:    Pain: Initial:   Pain Intensity 1: 0  Post Session:  0     Self Care: (20): Procedure(s) (per grid) utilized to improve and/or restore self-care/home management as related to toileting and grooming. Required minimal verbal and tactile cueing to facilitate activities of daily living skills and compensatory activities. Therapeutic Activity: (    10): Therapeutic activities including Chair transfers, Toilet transfers, and Ambulation on level ground to improve mobility and strength. Required minimal Safety awareness training;Verbal cues to promote static and dynamic balance in standing. Evaluation complete    Braces/Orthotics/Lines/Etc:   O2 Device: CPAP mask (NC)  Treatment/Session Assessment:    Response to Treatment:  Good, up in chair, decreased activity tolerance  Interdisciplinary Collaboration:   Physical Therapist  Occupational Therapist  Registered Nurse  After treatment position/precautions:   Up in chair  Bed/Chair-wheels locked  Caregiver at bedside  Call light within reach  RN notified   Compliance with Program/Exercises: Compliant all of the time, Will assess as treatment progresses. Recommendations/Intent for next treatment session: \"Next visit will focus on advancements to more challenging activities and reduction in assistance provided\".   Total Treatment Duration:  OT Patient Time In/Time Out  Time In: 1105  Time Out: [address], [place]

## 2022-03-13 NOTE — CONSULTS
[facility] Consult Note       Primary GI Physician: Dr. Maximilian Mack MD: Dr. Aldo Duarte    Referring Provider:  Dr. Armaan Be Date:  3/13/2022    Admit Date:  3/12/2022    Chief Complaint:  GERD, dysphagia    Subjective:     History of Present Illness:  Patient is a 80 y.o. female with PMH including but not limited to CHF with preserved EF, HTN, sleep apnea, who is seen in consultation at the request of Dr. Hans Garcia for GERD, poor po intake, and dysphagia. Speech has seen and reports no s/sx aspiration on Bedside swallow. No adjustments in diet made. Admitted for worsening PALACIOS after recent discharge despite 3 L oxygen continuously and change from Lasix to Bumex. Pt denies N/V except for when something gets stuck in her esophagus or when she brushes her teeth. She reports \"spitting up\" some evenings but thinks this is related to phlegm. She denies hematemesis or coffee ground emesis. She does report poor po intake. Has BRBPR intermittently but attributes this to hemorrhoids. Prior EGD and colonoscopies done by Dr. Dali Carrillo. Labs show mild microcytic anemia, normal Cr, with elevated BUN and low K. , LFTs and lipase normal. She is currently on 3L oxygen via NC with continued SOB. Patient was hospitalized for some acute kidney injury and exacerbation of congestive failure on March 8. She was on CPAP previously but went home on oxygen, 3 L around-the-clock and bumex changed to Lasix. Her Norvasc was stopped.        PMH:  Past Medical History:   Diagnosis Date    Acute on chronic diastolic congestive heart failure ([address] 6/29/2021    JOHN (acute kidney injury) ([address] 3/8/2022    COPD (chronic obstructive pulmonary disease) (HCC)     Diastolic heart failure (HCC)     DVT (deep vein thrombosis) in pregnancy     Fibromuscular dysplasia of bilateral renal arteries (HCC)     GERD (gastroesophageal reflux disease)     HLD (hyperlipidemia)     HTN (hypertension)     CAREN on CPAP     Osteoarthritis     Renal artery stenosis [facility])     s/p bilateral angioplasty 2/28/2013       PSH:  No past surgical history on file. Allergies: Allergies   Allergen Reactions    Benzalkonium Chloride Other (comments)     Benzalkonium  Conjunctivitis sx    Gold Sodium Thiosulfate Itching    Nickel Itching    Benzalkonium Other (comments)     Caused pink eye       Home Medications:  Prior to Admission medications    Medication Sig Start Date End Date Taking? Authorizing Provider   bumetanide (BUMEX) 1 mg tablet Take 1 Tablet by mouth daily. 3/11/22   Rich Vargas MD   lisinopriL (PRINIVIL, ZESTRIL) 2.5 mg tablet Take 1 Tablet by mouth daily. 3/10/22   Rich Vargas MD   metoprolol succinate (TOPROL-XL) 25 mg XL tablet Take 0.5 Tablets by mouth daily. 3/10/22   Rich Vargas MD   potassium 99 mg tablet Take 99 mg by mouth daily. Provider, Historical   RABEprazole (ACIPHEX) 20 mg TbEC Take 20 mg by mouth daily. Provider, Historical   pravastatin (PRAVACHOL) 40 mg tablet Take 40 mg by mouth nightly. Provider, Historical   calcium carbonate (TUMS) 200 mg calcium (500 mg) chew Take 1 Tablet by mouth daily. Provider, Historical   calcium-cholecalciferol, d3, (CALCIUM 600 + D) 600-125 mg-unit tab Take  by mouth. Provider, Historical   clotrimazole-betamethasone (LOTRISONE) topical cream Apply  to affected area as needed for Skin Irritation. Provider, Historical   triamcinolone acetonide (KENALOG) 0.1 % ointment Apply  to affected area as needed for Skin Irritation. use thin layer    Provider, Historical   aspirin 81 mg chewable tablet Take 81 mg by mouth daily. Provider, Historical   metaxalone (Skelaxin) 800 mg tablet Take  by mouth two (2) times a day. Provider, Historical   celecoxib (CeleBREX) 200 mg capsule Take  by mouth two (2) times a day. Provider, Historical   guaiFENesin ER (Mucinex) 600 mg ER tablet Take 600 mg by mouth two (2) times a day.     Provider, Historical   fluticasone propionate (FLONASE) 50 mcg/actuation nasal spray 2 Sprays by Both Nostrils route as needed. Provider, Historical   fluticasone propion-salmeteroL (Advair HFA) 115-21 mcg/actuation inhaler Take 2 Puffs by inhalation two (2) times a day. Provider, Historical   ALPRAZolam (XANAX) 0.5 mg tablet Take  by mouth as needed for Anxiety. Provider, Historical   sertraline (ZOLOFT) 50 mg tablet Take  by mouth as needed. Provider, Historical   guaiFENesin-dextromethorphan (ROBITUSSIN DM) 100-10 mg/5 mL syrup Take 5 mL by mouth three (3) times daily as needed for Cough or Congestion. Provider, Historical   HYDROcodone-acetaminophen (NORCO)  mg tablet Take 1 Tablet by mouth nightly. Provider, Historical   white pet-mineral oil-lanolin (AKWA TEARS) ophthalmic ointment Apply  to eye as needed. Provider, Historical   polyethylene glycol (Miralax) 17 gram packet Take 17 g by mouth as needed for Constipation.     Provider, Historical       Hospital Medications:  Current Facility-Administered Medications   Medication Dose Route Frequency    potassium chloride (K-DUR, KLOR-CON M20) SR tablet 40 mEq  40 mEq Oral BID    magnesium sulfate 2 g/50 ml IVPB (premix or compounded)  2 g IntraVENous ONCE    sodium chloride (NS) flush 5-10 mL  5-10 mL IntraVENous Q8H    sodium chloride (NS) flush 5-10 mL  5-10 mL IntraVENous PRN    amLODIPine (NORVASC) tablet 10 mg  10 mg Oral DAILY    aspirin chewable tablet 81 mg  81 mg Oral DAILY    budesonide-formoteroL (SYMBICORT) 160-4.5 mcg/actuation HFA inhaler 2 Puff  2 Puff Inhalation BID RT    bumetanide (BUMEX) tablet 1 mg  1 mg Oral DAILY    calcium carbonate (TUMS) chewable tablet 200 mg [elemental]  200 mg Oral DAILY    enoxaparin (LOVENOX) injection 40 mg  40 mg SubCUTAneous Q12H    guaiFENesin ER (MUCINEX) tablet 600 mg  600 mg Oral Q12H    pantoprazole (PROTONIX) tablet 40 mg  40 mg Oral ACB    polyethylene glycol (MIRALAX) packet 17 g  17 g Oral DAILY    pravastatin (PRAVACHOL) tablet 40 mg  40 mg Oral QHS    sertraline (ZOLOFT) tablet 50 mg  50 mg Oral DAILY    sodium chloride (NS) flush 5-10 mL  5-10 mL IntraVENous Q8H    acetaminophen (TYLENOL) tablet 650 mg  650 mg Oral Q6H PRN    Or    acetaminophen (TYLENOL) suppository 650 mg  650 mg Rectal Q6H PRN    ALPRAZolam (XANAX) tablet 0.5 mg  0.5 mg Oral TID PRN    promethazine (PHENERGAN) tablet 12.5 mg  12.5 mg Oral Q6H PRN    Or    ondansetron (ZOFRAN) injection 4 mg  4 mg IntraVENous Q6H PRN    sodium chloride (NS) flush 5-10 mL  5-10 mL IntraVENous PRN    tuberculin injection 5 Units  5 Units IntraDERMal ONCE       Social History:  Social History     Tobacco Use    Smoking status: Never Smoker    Smokeless tobacco: Never Used   Substance Use Topics    Alcohol use: Not on file       Pt denies any history of drug use, blood transfusions, or tattoos. Family History:  Family History   Problem Relation Age of Onset    Hypertension Mother     Hypertension Father        Review of Systems:  A detailed 10 system ROS is obtained, with pertinent positives as listed above. All others are negative. Diet:  regular    Objective:     Physical Exam:  Vitals:  Visit Vitals  BP (!) 152/69   Pulse 82   Temp 99.2 °F (37.3 °C)   Resp 20   Ht 5' 5\" (1.651 m)   Wt 111.6 kg (246 lb)   SpO2 92%   BMI 40.94 kg/m²     Gen:  Pt is alert, cooperative, no acute distress  Skin:  Extremities and face reveal no rashes. HEENT: Sclerae anicteric. Extra-occular muscles are intact. No oral ulcers. No abnormal pigmentation of the lips. The neck is supple. Cardiovascular: Regular rate and rhythm. No murmurs, gallops, or rubs. Respiratory:  Comfortable breathing with no accessory muscle use. Clear breath sounds anteriorly with no wheezes, rales, or rhonchi. OXYGEN via NC at 3L  GI:  Abdomen nondistended, soft, and nontender. Normal active bowel sounds. No enlargement of the liver or spleen.  No masses palpable. Rectal:  Deferred  Musculoskeletal:  + pitting edema of the lower legs. Neurological:  Gross memory appears intact. Patient is alert and oriented. Psychiatric:  Mood appears appropriate with judgement intact. Lymphatic:  No cervical or supraclavicular adenopathy. Laboratory:    Recent Labs     03/13/22  0358 03/12/22  1847   WBC 7.9 9.4   HGB 10.3* 10.7*   HCT 33.9* 34.9*    292   MCV 78.7* 78.3*   * 134*   K 3.0* 3.1*   CL 95* 93*   CO2 33* 33*   BUN 30* 30*   CREA 0.97 0.90   CA 9.2 8.1*   MG 0.8* 1.5*   * 76   AP  --  87   AST  --  25   ALT  --  24   TBILI  --  0.5   ALB  --  3.1*   TP  --  7.5   LPSE  --  62*      RUQ US 3/12/22: FINDINGS:     - Liver: The liver is normal in size at 15.7 cm. There are 3 cysts in the left  lobe, the largest measures 6.5 x 4.2 x 7.0 cm and contains an internal  septation. The other 2 simple cysts measure 1.2 x 0.9 x 0.9 cm and 4.8 x 3.5 x  4.5 cm. No solid liver mass or intrahepatic biliary dilatation is seen. - Gallbladder: There is mild nonspecific gallbladder wall thickening, measuring  3.2 mm. No gallstones are identified. There is no pericholecystic fluid. - Bile ducts: Within normal limits. The common bile duct measures 5.1 mm.  - Pancreas: Within normal limits. - Right kidney: Within normal limits. - Aorta and IVC: Within normal limits. - Portal vein: Within normal limits.  - Other: No ascites.     IMPRESSION  1. Three liver cysts, the largest containing a thin internal septation. 2. Mild nonspecific gallbladder wall thickening. Assessment:     Principal Problem:    Weakness (3/12/2022)    Active Problems:    Lymphedema of both lower extremities (5/2/2016)      CAREN (obstructive sleep apnea) (1/4/2016)      Overview: May 22, 2007 but night study demonstrated mild CAREN with AHI 10/h, PLM       index 10/h, titrated CPAP 8 cm H2O with residual RDI 6.6/h.       Mild persistent asthma without complication (6/03/3438)      Overview: Spirometry without obstruction, normal FVC and FEV1. DLCO is normal, not       corrected for hgb. Chronic diastolic heart failure ([facility] (1/4/2016)      80 y.o. female with PMH including but not limited to CHF with preserved EF, HTN, sleep apnea, who is seen in consultation at the request of Dr. Katie Washington for GERD and dysphagia. Speech has seen and reports no s/sx aspiration on Bedside swallow. Pt has had GERD, solid food dysphagia, and poor po intake x one month. Mild microcytic anemia and elevated BUN/cr ratio noted. Recently hospitalized for CHF exacerbation and discharged home on 3L O2 and diuretics (Cr normal but K low). Despite this, she was admitted for worsening PALACIOS and now on CPAP. RUQ US obtained in ED due to TTP RUQ on exam with nonspecific GB wall thickening and normal LFTs/lipase. Nontender on exam for me. Lungs relatively clear. Plan:     - check iron studies. - trend HGB  - replace K  - continue Protonix 40mg daily. - hold lovenox if hgb drops or overt bleed noted. - plan EGD when respiratory status improves- possibly Monday or Tuesday. Will make NPO after MN tonight in case. Juma Maldonado [place]      Patient is seen and examined in collaboration with Dr. Paulina Christy. Assessment and plan as per Dr. Paulina Christy.

## 2022-03-13 NOTE — PROGRESS NOTES
Patient is sitting up in bed eating. Denies nausea or vomitting. She reports falling about 3 weeks ago and has back pain intermittently. She denies pain at this time.

## 2022-03-13 NOTE — DISCHARGE INSTRUCTIONS
Your lab work and x-ray and EKG look very reassuring. Continue your current medications. Keep appointment with your cardiologist in 3 days.

## 2022-03-13 NOTE — PROGRESS NOTES
03/12/22 2211   Dual Skin Pressure Injury Assessment   Dual Skin Pressure Injury Assessment WDL   Second Care Provider (Based on [address] [place] [address]) Anita RAMÍREZ   Skin Integumentary   Skin Integumentary (WDL) X    Pressure  Injury Documentation No Pressure Injury Noted-Pressure Ulcer Prevention Initiated   Skin Integrity Other (comment)  (redness between inner thigh area)   Wound Prevention and Protection Methods   Orientation of Wound Prevention Posterior   Location of Wound Prevention Sacrum/Coccyx   Dressing Present  No   Wound Offloading (Prevention Methods) Bed, pressure reduction mattress

## 2022-03-13 NOTE — PROGRESS NOTES
TRANSFER - IN REPORT:    Verbal report received from Shabbir on Jordon Sampson  being received from ER for routine progression of care      Report consisted of patients Situation, Background, Assessment and   Recommendations(SBAR). Information from the following report(s) ED Summary was reviewed with the receiving nurse. Opportunity for questions and clarification was provided. Assessment completed upon patients arrival to unit and care assumed.

## 2022-03-13 NOTE — ED NOTES
TRANSFER - OUT REPORT:    Verbal report given to RN on Rich Aguirre  being transferred to Room 324 for routine progression of care       Report consisted of patients Situation, Background, Assessment and   Recommendations(SBAR). Information from the following report(s) SBAR was reviewed with the receiving nurse. Lines:   Peripheral IV 03/12/22 Left;Posterior Hand (Active)        Opportunity for questions and clarification was provided.       Patient transported with:   Registered Nurse

## 2022-03-13 NOTE — PROGRESS NOTES
Problem: Mobility Impaired (Adult and Pediatric)  Goal: *Acute Goals and Plan of Care (Insert Text)  Outcome: Progressing Towards Goal  Note: STG:  (1.)Ms. Kleber Nicolas will move from supine to sit and sit to supine  with MINIMAL ASSIST within 3 treatment day(s). (2.)Ms. Kleber Nicolas will transfer from bed to chair and chair to bed with STAND BY ASSIST using the least restrictive device within 3 treatment day(s). (3.)Ms. Kleber Nicolas will ambulate with STAND BY ASSIST for 25 feet with the least restrictive device within 3 treatment day(s). LTG:  (1.)Ms. Kleber Nicolas will move from supine to sit and sit to supine  in bed with STAND BY ASSIST within 7 treatment day(s). (2.)Ms. Kleber Nicolas will transfer from bed to chair and chair to bed with SUPERVISION using the least restrictive device within 7 treatment day(s). (3.)Ms. Kleber Nicolas will ambulate with SUPERVISION for 50 feet with the least restrictive device within 7 treatment day(s). ________________________________________________________________________________________________       PHYSICAL THERAPY: Initial Assessment and AM 3/13/2022  OBSERVATION: PT Visit Days : 1  [place] / [place] / Product Type: Medicare /       NAME/AGE/GENDER: Nikolas Chapman is a 80 y.o. female   PRIMARY DIAGNOSIS: Weakness [R53.1] Weakness Weakness        ICD-10: Treatment Diagnosis:    Generalized Muscle Weakness (M62.81)  Difficulty in walking, Not elsewhere classified (R26.2)   Precaution/Allergies:  Benzalkonium chloride, Gold sodium thiosulfate, Nickel, and Benzalkonium      ASSESSMENT:     Ms. Kleber Nicolas presents with above diagnosis. Patient demonstrates weakness throughout limiting independence with all transfers and mobility and limiting activity tolerance. She would benefit from therapy to address these deficits. Patient just hospitalized and d/c'd home with [place] on 3/10.   Patient reports therapy did not start before she returned to the hospital.  Daughter reports patient has been struggling to get around at home and interested in SNF for STR. Patient currently admitted under observation status and not eligible for SNF. She certainly could benefit from SNF if admission status does change considering she did not do well at home following last d/c. This section established at most recent assessment   PROBLEM LIST (Impairments causing functional limitations):  Decreased Strength  Decreased ADL/Functional Activities  Decreased Transfer Abilities  Decreased Ambulation Ability/Technique  Decreased Balance  Decreased Activity Tolerance  Increased Shortness of Breath   INTERVENTIONS PLANNED: (Benefits and precautions of physical therapy have been discussed with the patient.)  Balance Exercise  Bed Mobility  Family Education  Gait Training  Home Exercise Program (HEP)  Therapeutic Activites  Therapeutic Exercise/Strengthening  Transfer Training     TREATMENT PLAN: Frequency/Duration: daily for duration of hospital stay  Rehabilitation Potential For Stated Goals: Good     REHAB RECOMMENDATIONS (at time of discharge pending progress):    Placement: It is my opinion, based on this patient's performance to date, that Ms. Rubia Pierre may benefit from participating in 1-2 additional therapy sessions in order to continue to assess for rehab potential and then make recommendation for disposition at discharge. HH vs SNF depending on admission status. SNF if available. Equipment:   None at this time              HISTORY:   History of Present Injury/Illness (Reason for Referral): Abril Owens is a 80 y.o. female with medical history of CHF, HTN, COPD, and recent hospitalization for CHF exacerbation who presented to ED with weakness. Patient was discharged to home on 3/10/22 with home health. [facility] told her and family that she appeared ill and needed to go back to the hospital.  She reports continued SOB, however she is not hypoxic, even on ambulation in the ER. No worsening edema. Lab work is stable. No fevers. Given weakness and potential need for STR, Hospitalist consulted for admission. Patient mentions a few episodes of vomiting at home, and continued R hip pain after a fall 3 weeks ago, so ER has pending XR and US. Past Medical History/Comorbidities:   Ms. Araceli Carrasquillo  has a past medical history of Acute on chronic diastolic congestive heart failure ([address] (6/29/2021), JOHN (acute kidney injury) ([address] (3/8/2022), COPD (chronic obstructive pulmonary disease) ([address] Diastolic heart failure ([address] DVT (deep vein thrombosis) in pregnancy, Fibromuscular dysplasia of bilateral renal arteries ([address] GERD (gastroesophageal reflux disease), HLD (hyperlipidemia), HTN (hypertension), CAREN on CPAP, Osteoarthritis, and Renal artery stenosis ([address] Ms. Araceli Carrasquillo  has no past surgical history on file. Social History/Living Environment:   Home Environment: Private residence  Wheelchair Ramp: Yes  One/Two Story Residence: One story  Living Alone: No  Support Systems: Child(len),Spouse/Significant Other  Patient Expects to be Discharged to[de-identified] Unable to determine at this time  Current DME Used/Available at Home: [employer], rollator,Oxygen, portable  Tub or Shower Type: Shower  Prior Level of Function/Work/Activity:  Ambulating with rollator prior to recent admissions. Number of Personal Factors/Comorbidities that affect the Plan of Care: 1-2: MODERATE COMPLEXITY   EXAMINATION:   Most Recent Physical Functioning:   Gross Assessment:  AROM: Generally decreased, functional  Strength: Generally decreased, functional  Coordination: Generally decreased, functional               Posture:     Balance:  Sitting: Intact  Standing: With support Bed Mobility:  Supine to Sit: Moderate assistance; Additional time  Scooting: Contact guard assistance; Additional time  Wheelchair Mobility:     Transfers:  Sit to Stand: Contact guard assistance  Stand to Sit: Contact guard assistance  Bed to Chair: Contact guard assistance; Additional time  Gait:     Base of Support: Center of gravity altered  Speed/Anne Marie: Delayed  Step Length: Left shortened;Right shortened  Gait Abnormalities: Decreased step clearance  Distance (ft): 18 Feet (ft) (x 1; 10' x 1)  Assistive Device: Walker, rollator  Ambulation - Level of Assistance: Contact guard assistance  Interventions: Safety awareness training;Verbal cues  Home Environment: Private residence  Home Situation  Home Environment: Private residence  Wheelchair Ramp: Yes  One/Two Story Residence: One story  Living Alone: No  Support Systems: Child(len),Spouse/Significant Other  Patient Expects to be Discharged to[de-identified] Unable to determine at this time  Current DME Used/Available at Home: [employer], rollator,Oxygen, portable  Tub or Shower Type: Shower      Body Structures Involved:  Muscles Body Functions Affected: Movement Related Activities and Participation Affected: Mobility   Number of elements that affect the Plan of Care: 3: MODERATE COMPLEXITY   CLINICAL PRESENTATION:   Presentation: Stable and uncomplicated: LOW COMPLEXITY   CLINICAL DECISION MAKIN [facility] [address] [id number] AM-PAC 6 Clicks   Basic Mobility Inpatient Short Form  How much difficulty does the patient currently have. .. Unable A Lot A Little None   1. Turning over in bed (including adjusting bedclothes, sheets and blankets)? [] 1   [x] 2   [] 3   [] 4   2. Sitting down on and standing up from a chair with arms ( e.g., wheelchair, bedside commode, etc.)   [] 1   [] 2   [x] 3   [] 4   3. Moving from lying on back to sitting on the side of the bed? [] 1   [x] 2   [] 3   [] 4   How much help from another person does the patient currently need. .. Total A Lot A Little None   4. Moving to and from a bed to a chair (including a wheelchair)? [] 1   [] 2   [x] 3   [] 4   5. Need to walk in hospital room? [] 1   [] 2   [x] 3   [] 4   6. Climbing 3-5 steps with a railing?    [] 1   [x] 2   [] 3   [] 4   © 2007, Trustees of [address] [place] [address] [address] [id number], under license to [employer] All rights reserved      Score:  Initial: 15 Most Recent: X (Date: -- )    Interpretation of Tool:  Represents activities that are increasingly more difficult (i.e. Bed mobility, Transfers, Gait). Medical Necessity:     Patient is expected to demonstrate progress in   strength, balance, and coordination   to   increase independence with mobility. .  Reason for Services/Other Comments:  Patient continues to require skilled intervention due to   Decreased strength, mobility, and tolerance for activity. .   Use of outcome tool(s) and clinical judgement create a POC that gives a: Clear prediction of patient's progress: LOW COMPLEXITY            TREATMENT:   (In addition to Assessment/Re-Assessment sessions the following treatments were rendered)   Pre-treatment Symptoms/Complaints:  patient agreeable. Needing to toilet. Pain: Initial:   Pain Intensity 1: 0  Post Session:  0     Therapeutic Activity: (    38 minutes): Therapeutic activities including Bed transfers, Chair transfers, Toilet transfers, Ambulation on level ground, and standing balance for hand hygiene to improve mobility, strength, balance, and coordination. Required minimal Safety awareness training;Verbal cues to promote static and dynamic balance in standing. assessment    Braces/Orthotics/Lines/Etc:   O2  Treatment/Session Assessment:    Response to Treatment:  patient participated well but moves very slowly with multiple rest breaks. Interdisciplinary Collaboration:   Physical Therapist  Registered Nurse    After treatment position/precautions:   Up in chair  Bed/Chair-wheels locked  Call light within reach  Family at bedside   Compliance with Program/Exercises: Will assess as treatment progresses  Recommendations/Intent for next treatment session: \"Next visit will focus on advancements to more challenging activities and reduction in assistance provided\".   Total Treatment Duration:  PT Patient Time In/Time Out  Time In: 0604  Time Out: 13[address] [facility] PT

## 2022-03-13 NOTE — PROGRESS NOTES
Hospitalist Progress Note   Admit Date:  3/12/2022  6:26 PM   Name:  Christ Hair   Age:  80 y.o. Sex:  female  :  1940   MRN:  [id number]   Room:  [facility]/    Presenting Complaint: Shortness of Breath    Reason(s) for Admission: Weakness [R53.1]     Hospital Course & Interval History:   82F PMHx CHF, HTN, COPD, and recent hospitalization for CHF exacerbation who presented to ED with weakness. Patient was discharged to home on 3/10/22 with home health. [facility] RN told her and family that she appeared ill and needed to go back to the hospital.  She reports continued SOB, however she is not hypoxic, even on ambulation in the ER. No worsening edema. Lab work is stable. No fevers. Given weakness and potential need for STR, Hospitalist consulted for admission. Patient mentions a few episodes of vomiting at home, and continued R hip pain after a fall 3 weeks ago, so ER has pending XR and US. Subjective/24hr Events (22): Feels depressed. Was walking today with physical therapy. Required a good bit of support. Daughter is working on discharge plan. X-ray unable to rule out hip fracture. ROS:  10 systems reviewed and negative except as noted above. Assessment & Plan:   * Weakness  - Easily fatigued  - No focal deficits  - Likely somewhat deconditioned given recent hospitalized  - Patient had wished to go home 2 days ago upon discharge, but may benefit from STR  - PT/OT consulted  - PPD ordered  - Consult to Case Management    Fall  X-ray right hip negative. Still pain with ambulation.   -MRI right hip    Class 3 severe obesity due to excess calories with serious comorbidity and body mass index (BMI) of 40.0 to 44.9 in [facility])  Increased risk of all cause mortality, complicating care  - healthy lifestyle at discharge    Chronic diastolic heart failure ([address]  - Not in exacerbation  - Continue home meds as prescribed upon discharge on 3/10  - Stable on home 3lpm    Mild persistent asthma without complication  - Home meds  - Stable on home O2    CAREN (obstructive sleep apnea)  - CPAP qhs    Lymphedema of both lower extremities  - Chronic, not worsened since discharge        Dispo/Discharge Planning:    Rehab or home tomorrow    Diet:  ADULT DIET Regular  DVT PPx: Enoxaparin  Code status: Full Code    Hospital Problems as of 3/13/2022 Date Reviewed: 3/10/2022          Codes Class Noted - Resolved POA    * (Principal) Weakness ICD-10-CM: R53.1  ICD-9-CM: 780.79  3/12/2022 - Present Unknown        Mild persistent asthma without complication (Chronic) [id number]: J45.30  ICD-9-CM: 493.90  3/14/2018 - Present Yes    Overview Addendum 3/10/2022 10:49 AM by Trinidad Brooks MD     Spirometry without obstruction, normal FVC and FEV1. DLCO is normal, not corrected for hgb. Lymphedema of both lower extremities (Chronic) ICD-10-CM: I89.0  ICD-9-CM: 457.1  5/2/2016 - Present Yes        CAREN (obstructive sleep apnea) ICD-10-CM: G47.33  ICD-9-CM: 327.23  1/4/2016 - Present Yes    Overview Addendum 3/10/2022 10:50 AM by Trinidad Brooks MD     May 22, 2007 but night study demonstrated mild CAREN with AHI 10/h, PLM index 10/h, titrated CPAP 8 cm H2O with residual RDI 6.6/h.              Chronic diastolic heart failure (HCC) (Chronic) ICD-10-CM: I50.32  ICD-9-CM: 428.32  1/4/2016 - Present Yes              Objective:     Patient Vitals for the past 24 hrs:   Temp Pulse Resp BP SpO2   03/13/22 0535 98.6 °F (37 °C) 75 18 (!) 134/52 91 %   03/13/22 0011 98.6 °F (37 °C) 80 18 (!) 135/59 95 %   03/12/22 2227 98.7 °F (37.1 °C) 79 18 (!) 134/50 97 %   03/12/22 2059  76 18 (!) 141/63 98 %   03/12/22 2020  72  (!) 138/104    03/12/22 2019  74 20 (!) 138/104 97 %   03/12/22 2000  93 25  98 %   03/12/22 1959  93 24  97 %   03/12/22 1958  95 (!) 37  96 %   03/12/22 1957  94 (!) 33  96 %   03/12/22 1827 98.1 °F (36.7 °C) 74 20 (!) 147/65 99 %     Oxygen Therapy  O2 Sat (%): 91 % (03/13/22 0535)  Pulse via Oximetry: 75 beats per minute (03/12/22 2059)  O2 Device: Nasal cannula (03/12/22 1827)  O2 Flow Rate (L/min): 3 l/min (03/12/22 1827)    Estimated body mass index is 40.94 kg/m² as calculated from the following:    Height as of this encounter: 5' 5\" (1.651 m). Weight as of this encounter: 111.6 kg (246 lb). No intake or output data in the 24 hours ending 03/13/22 0751      Blood pressure (!) 134/52, pulse 75, temperature 98.6 °F (37 °C), resp. rate 18, height 5' 5\" (1.651 m), weight 111.6 kg (246 lb), SpO2 91 %. Physical Exam  Vitals and nursing note reviewed. Constitutional:       General: She is not in acute distress. Appearance: Normal appearance. She is obese. HENT:      Head: Normocephalic. Eyes:      Extraocular Movements: Extraocular movements intact. Cardiovascular:      Rate and Rhythm: Normal rate. Pulses:           Radial pulses are 2+ on the left side. Pulmonary:      Effort: Pulmonary effort is normal. No respiratory distress. Musculoskeletal:         General: Tenderness and signs of injury present. No deformity. Cervical back: No rigidity. Right lower leg: Edema present. Left lower leg: Edema present. Skin:     General: Skin is warm and dry. Findings: Bruising present. Neurological:      General: No focal deficit present. Mental Status: She is alert and oriented to person, place, and time. Psychiatric:         Mood and Affect: Mood is depressed. Behavior: Behavior is withdrawn. Behavior is cooperative.          I have reviewed ordered lab tests and independently visualized imaging below:    Recent Labs:  Recent Results (from the past 48 hour(s))   EKG, 12 LEAD, INITIAL    Collection Time: 03/12/22  6:28 PM   Result Value Ref Range    Ventricular Rate 75 BPM    Atrial Rate 75 BPM    P-R Interval 198 ms    QRS Duration 100 ms    Q-T Interval 408 ms    QTC Calculation (Bezet) 455 ms    Calculated P Axis 71 degrees    Calculated R Axis 17 degrees    Calculated T Axis 55 degrees    Diagnosis       Normal sinus rhythm  ST abnormality, possible digitalis effect  Abnormal ECG  When compared with ECG of 12-MAR-2022 16:29,  Borderline criteria for Lateral infarct are no longer Present  Nonspecific T wave abnormality has replaced inverted T waves in Anterior   leads     CBC WITH AUTOMATED DIFF    Collection Time: 03/12/22  6:47 PM   Result Value Ref Range    WBC 9.4 4.3 - 11.1 K/uL    RBC 4.46 4.05 - 5.2 M/uL    HGB 10.7 (L) 11.7 - 15.4 g/dL    HCT 34.9 (L) 35.8 - 46.3 %    MCV 78.3 (L) 79.6 - 97.8 FL    MCH 24.0 (L) 26.1 - 32.9 PG    MCHC 30.7 (L) 31.4 - 35.0 g/dL    RDW 17.2 (H) 11.9 - 14.6 %    PLATELET 699 384 - 096 K/uL    MPV 10.9 9.4 - 12.3 FL    ABSOLUTE NRBC 0.00 0.0 - 0.2 K/uL    DF AUTOMATED      NEUTROPHILS 61 43 - 78 %    LYMPHOCYTES 22 13 - 44 %    MONOCYTES 10 4.0 - 12.0 %    EOSINOPHILS 6 0.5 - 7.8 %    BASOPHILS 1 0.0 - 2.0 %    IMMATURE GRANULOCYTES 0 0.0 - 5.0 %    ABS. NEUTROPHILS 5.7 1.7 - 8.2 K/UL    ABS. LYMPHOCYTES 2.1 0.5 - 4.6 K/UL    ABS. MONOCYTES 1.0 0.1 - 1.3 K/UL    ABS. EOSINOPHILS 0.6 0.0 - 0.8 K/UL    ABS. BASOPHILS 0.1 0.0 - 0.2 K/UL    ABS. IMM. GRANS. 0.0 0.0 - 0.5 K/UL   METABOLIC PANEL, COMPREHENSIVE    Collection Time: 03/12/22  6:47 PM   Result Value Ref Range    Sodium 134 (L) 136 - 145 mmol/L    Potassium 3.1 (L) 3.5 - 5.1 mmol/L    Chloride 93 (L) 98 - 107 mmol/L    CO2 33 (H) 21 - 32 mmol/L    Anion gap 8 7 - 16 mmol/L    Glucose 76 65 - 100 mg/dL    BUN 30 (H) 8 - 23 MG/DL    Creatinine 0.90 0.6 - 1.0 MG/DL    GFR est AA >60 >60 ml/min/1.73m2    GFR est non-AA >60 >60 ml/min/1.73m2    Calcium 8.1 (L) 8.3 - 10.4 MG/DL    Bilirubin, total 0.5 0.2 - 1.1 MG/DL    ALT (SGPT) 24 12 - 65 U/L    AST (SGOT) 25 15 - 37 U/L    Alk.  phosphatase 87 50 - 130 U/L    Protein, total 7.5 6.3 - 8.2 g/dL    Albumin 3.1 (L) 3.2 - 4.6 g/dL    Globulin 4.4 (H) 2.3 - 3.5 g/dL    A-G Ratio 0.7 (L) 1.2 - 3. 5     MAGNESIUM    Collection Time: 03/12/22  6:47 PM   Result Value Ref Range    Magnesium 1.5 (L) 1.8 - 2.4 mg/dL   NT-PRO BNP    Collection Time: 03/12/22  6:47 PM   Result Value Ref Range    NT pro- (H) <450 PG/ML   LIPASE    Collection Time: 03/12/22  6:47 PM   Result Value Ref Range    Lipase 62 (L) 73 - 393 U/L   CBC WITH AUTOMATED DIFF    Collection Time: 03/13/22  3:58 AM   Result Value Ref Range    WBC 7.9 4.3 - 11.1 K/uL    RBC 4.31 4.05 - 5.2 M/uL    HGB 10.3 (L) 11.7 - 15.4 g/dL    HCT 33.9 (L) 35.8 - 46.3 %    MCV 78.7 (L) 79.6 - 97.8 FL    MCH 23.9 (L) 26.1 - 32.9 PG    MCHC 30.4 (L) 31.4 - 35.0 g/dL    RDW 17.1 (H) 11.9 - 14.6 %    PLATELET 056 719 - 927 K/uL    MPV 11.1 9.4 - 12.3 FL    ABSOLUTE NRBC 0.00 0.0 - 0.2 K/uL    DF AUTOMATED      NEUTROPHILS 53 43 - 78 %    LYMPHOCYTES 27 13 - 44 %    MONOCYTES 10 4.0 - 12.0 %    EOSINOPHILS 8 (H) 0.5 - 7.8 %    BASOPHILS 1 0.0 - 2.0 %    IMMATURE GRANULOCYTES 0 0.0 - 5.0 %    ABS. NEUTROPHILS 4.2 1.7 - 8.2 K/UL    ABS. LYMPHOCYTES 2.2 0.5 - 4.6 K/UL    ABS. MONOCYTES 0.8 0.1 - 1.3 K/UL    ABS. EOSINOPHILS 0.6 0.0 - 0.8 K/UL    ABS. BASOPHILS 0.1 0.0 - 0.2 K/UL    ABS. IMM. GRANS. 0.0 0.0 - 0.5 K/UL   METABOLIC PANEL, BASIC    Collection Time: 03/13/22  3:58 AM   Result Value Ref Range    Sodium 134 (L) 136 - 145 mmol/L    Potassium 3.0 (L) 3.5 - 5.1 mmol/L    Chloride 95 (L) 98 - 107 mmol/L    CO2 33 (H) 21 - 32 mmol/L    Anion gap 6 (L) 7 - 16 mmol/L    Glucose 139 (H) 65 - 100 mg/dL    BUN 30 (H) 8 - 23 MG/DL    Creatinine 0.97 0.6 - 1.0 MG/DL    GFR est AA >60 >60 ml/min/1.73m2    GFR est non-AA 58 (L) >60 ml/min/1.73m2    Calcium 9.2 8.3 - 10.4 MG/DL       All Micro Results     None          Other Studies:  XR HIP RT W OR WO PELV 2-3 VWS    Result Date: 3/12/2022  EXAM: Pelvis and right hip x-rays. INDICATION: Pain, fall injury several weeks ago. COMPARISON: None.  TECHNIQUE: A frontal view of the pelvis was supplemented with 2 dedicated views of the right hip joint. FINDINGS: No acute fracture or dislocation is seen. The pelvic ring is intact. 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 If there is continued concern for an occult hip fracture, MRI is a more sensitive study. No acute process.  ABD LTD    Result Date: 3/13/2022  EXAM: Limited abdomen ultrasound. INDICATION: Pain. COMPARISON: None. TECHNIQUE: Standard protocol limited right upper quadrant abdomen ultrasound. FINDINGS: - Liver: The liver is normal in size at 15.7 cm. There are 3 cysts in the left lobe, the largest measures 6.5 x 4.2 x 7.0 cm and contains an internal septation. The other 2 simple cysts measure 1.2 x 0.9 x 0.9 cm and 4.8 x 3.5 x 4.5 cm. No solid liver mass or intrahepatic biliary dilatation is seen. - Gallbladder: There is mild nonspecific gallbladder wall thickening, measuring 3.2 mm. No gallstones are identified. There is no pericholecystic fluid. - Bile ducts: Within normal limits. The common bile duct measures 5.1 mm. - Pancreas: Within normal limits. - Right kidney: Within normal limits. - Aorta and IVC: Within normal limits. - Portal vein: Within normal limits. - Other: No ascites. 1. Three liver cysts, the largest containing a thin internal septation. 2. Mild nonspecific gallbladder wall thickening. XR CHEST PORT    Result Date: 3/12/2022  EXAMINATION: XR CHEST PORT 3/12/2022 7:03 PM ACCESSION NUMBER: [id number] COMPARISON: 3/8/2022 INDICATION: Shortness of Breath TECHNIQUE: A single view of the chest was obtained. FINDINGS: Support Devices: *  None Cardiac Silhouette: The axilla is enlarged, unchanged. Mediastinum: Aortic arch calcifications. Lungs: No airspace consolidation. No pneumothorax or sizable pleural effusion. Upper Abdomen: Normal Miscellaneous: No fracture or suspicious osseous lesion. Stable cardiomegaly without focal airspace consolidation.        Current Meds:  Current Facility-Administered Medications Medication Dose Route Frequency    potassium chloride (K-DUR, KLOR-CON M20) SR tablet 40 mEq  40 mEq Oral BID    sodium chloride (NS) flush 5-10 mL  5-10 mL IntraVENous Q8H    sodium chloride (NS) flush 5-10 mL  5-10 mL IntraVENous PRN    amLODIPine (NORVASC) tablet 10 mg  10 mg Oral DAILY    aspirin chewable tablet 81 mg  81 mg Oral DAILY    budesonide-formoteroL (SYMBICORT) 160-4.5 mcg/actuation HFA inhaler 2 Puff  2 Puff Inhalation BID RT    bumetanide (BUMEX) tablet 1 mg  1 mg Oral DAILY    calcium carbonate (TUMS) chewable tablet 200 mg [elemental]  200 mg Oral DAILY    enoxaparin (LOVENOX) injection 40 mg  40 mg SubCUTAneous Q12H    guaiFENesin ER (MUCINEX) tablet 600 mg  600 mg Oral Q12H    pantoprazole (PROTONIX) tablet 40 mg  40 mg Oral ACB    polyethylene glycol (MIRALAX) packet 17 g  17 g Oral DAILY    pravastatin (PRAVACHOL) tablet 40 mg  40 mg Oral QHS    sertraline (ZOLOFT) tablet 50 mg  50 mg Oral DAILY    sodium chloride (NS) flush 5-10 mL  5-10 mL IntraVENous Q8H    acetaminophen (TYLENOL) tablet 650 mg  650 mg Oral Q6H PRN    Or    acetaminophen (TYLENOL) suppository 650 mg  650 mg Rectal Q6H PRN    ALPRAZolam (XANAX) tablet 0.5 mg  0.5 mg Oral TID PRN    promethazine (PHENERGAN) tablet 12.5 mg  12.5 mg Oral Q6H PRN    Or    ondansetron (ZOFRAN) injection 4 mg  4 mg IntraVENous Q6H PRN    sodium chloride (NS) flush 5-10 mL  5-10 mL IntraVENous PRN    tuberculin injection 5 Units  5 Units IntraDERMal ONCE       Signed:  Rohith Ko MD

## 2022-03-13 NOTE — PROGRESS NOTES
SPEECH LANGUAGE PATHOLOGY: DYSPHAGIA  Initial Assessment and Discharge    NAME/AGE/GENDER: Stone Noel is a 80 y.o. female  DATE: 3/13/2022  PRIMARY DIAGNOSIS: Weakness [R53.1]      ICD-10: Treatment Diagnosis: R13.11 Dysphagia, Oral Phase    RECOMMENDATIONS   DIET:    Regular Consistency   Thin Liquids    MEDICATIONS: With liquid     ASPIRATION PRECAUTIONS  · Slow rate of intake  · Small bites/sips  · Upright at 90 degrees during meal     COMPENSATORY STRATEGIES/MODIFICATIONS  · Alternate liquids/solids  · Small sips and bites     EDUCATION:  · Recommendations discussed with Nursing  · Family   · Patient     CONTINUATION OF SKILLED SERVICES/MEDICAL NECESSITY:   Discontinue     RECOMMENDATIONS for CONTINUED SPEECH THERAPY: No further speech therapy indicated at this time. ASSESSMENT   Patient presents with complaints of foods feeling stuck, early satiety, and poor overall intakes. No overt s/sx with any/all consistencies. Patient denotes \"stuck\" feeling in mid sternal area. Does not report coughing/choking associated with episodes but does endorse vomiting (typically of only food sans stomach acid). Patient also reports GERD and may benefit from further intervention as currently only taking TUMS per patient report/MAR. Recommend continue current diet recommendations, alternate bites/sips, small/frequent presentations. May benefit from nutrition and GI consults. PLAN    FREQUENCY/DURATION: No further speech therapy indicated at this time as oropharyngeal swallow function is within normal limits. SUBJECTIVE   Pleasant, reports already full from lunch.    History of Present Injury/Illness: Ms. Debra Lipscomb  has a past medical history of Acute on chronic diastolic congestive heart failure ([address] (6/29/2021), JOHN (acute kidney injury) ([address] (3/8/2022), COPD (chronic obstructive pulmonary disease) ([facility] Diastolic heart failure ([address] DVT (deep vein thrombosis) in pregnancy, Fibromuscular dysplasia of bilateral renal arteries ([address] GERD (gastroesophageal reflux disease), HLD (hyperlipidemia), HTN (hypertension), CAREN on CPAP, Osteoarthritis, and Renal artery stenosis ([address] . She also  has no past surgical history on file. Problem List:  (Impairments causing functional limitations):  1. Dysphagia     Previous Dysphagia: YES reports increased difficulty with foods getting stuck and vomiting for last few weeks  Diet Prior to Evaluation: regular/thin    Orientation:   Person  Place  Time  Situation    Pain: Pain Scale 1: Numeric (0 - 10)  Pain Intensity 1: 0    OBJECTIVE   Oral Motor:   · Labial: No impairment  · Oral Hygiene: Adequate  · Lingual: No impairment    Swallow evaluation:   Patient consumed trials of noon meal (although mostly full prior to SLP arrival). Agreeable to take a few bites. Increased mastication time, but adequate oral clearance observed. No overt s/sx with cup/straw sips of thin liquids. Patient denotes \"stuck\" feeling in mid sternal area. Does not report coughing/choking associated with episodes but does endorse vomiting (typically of only food sans stomach acid). Patient also reports GERD and may benefit from further intervention as currently only taking TUMS per patient report/MAR.            INTERDISCIPLINARY COLLABORATION: Registered Nurse  PRECAUTIONS/ALLERGIES: Benzalkonium chloride, Gold sodium thiosulfate, Nickel, and Benzalkonium    Tool Used: Dysphagia Outcome and Severity Scale (SHENG)    Score Comments   Normal Diet  [] 7 With no strategies or extra time needed   Functional Swallow  [x] 6 May have mild oral or pharyngeal delay   Mild Dysphagia  [] 5 Which may require one diet consistency restricted    Mild-Moderate Dysphagia  [] 4 With 1-2 diet consistencies restricted   Moderate Dysphagia  [] 3 With 2 or more diet consistencies restricted   Moderate-Severe Dysphagia  [] 2 With partial PO strategies (trials with ST only)   Severe Dysphagia  [] 1 With inability to tolerate any PO safely      Score:  Initial: 6 Most Recent: x (Date 03/13/22 )   Interpretation of Tool: The Dysphagia Outcome and Severity Scale (SHENG) is a simple, easy-to-use, 7-point scale developed to systematically rate the functional severity of dysphagia based on objective assessment and make recommendations for diet level, independence level, and type of nutrition. Current Medications:   No current facility-administered medications on file prior to encounter. Current Outpatient Medications on File Prior to Encounter   Medication Sig Dispense Refill    bumetanide (BUMEX) 1 mg tablet Take 1 Tablet by mouth daily. 30 Tablet 0    lisinopriL (PRINIVIL, ZESTRIL) 2.5 mg tablet Take 1 Tablet by mouth daily. 30 Tablet 0    metoprolol succinate (TOPROL-XL) 25 mg XL tablet Take 0.5 Tablets by mouth daily. 15 Tablet 0    potassium 99 mg tablet Take 99 mg by mouth daily.  RABEprazole (ACIPHEX) 20 mg TbEC Take 20 mg by mouth daily.  pravastatin (PRAVACHOL) 40 mg tablet Take 40 mg by mouth nightly.  calcium carbonate (TUMS) 200 mg calcium (500 mg) chew Take 1 Tablet by mouth daily.  calcium-cholecalciferol, d3, (CALCIUM 600 + D) 600-125 mg-unit tab Take  by mouth.  clotrimazole-betamethasone (LOTRISONE) topical cream Apply  to affected area as needed for Skin Irritation.  triamcinolone acetonide (KENALOG) 0.1 % ointment Apply  to affected area as needed for Skin Irritation. use thin layer      aspirin 81 mg chewable tablet Take 81 mg by mouth daily.  metaxalone (Skelaxin) 800 mg tablet Take  by mouth two (2) times a day.  celecoxib (CeleBREX) 200 mg capsule Take  by mouth two (2) times a day.  guaiFENesin ER (Mucinex) 600 mg ER tablet Take 600 mg by mouth two (2) times a day.  fluticasone propionate (FLONASE) 50 mcg/actuation nasal spray 2 Sprays by Both Nostrils route as needed.       fluticasone propion-salmeteroL (Advair HFA) 115-21 mcg/actuation inhaler Take 2 Puffs by inhalation two (2) times a day.  ALPRAZolam (XANAX) 0.5 mg tablet Take  by mouth as needed for Anxiety.  sertraline (ZOLOFT) 50 mg tablet Take  by mouth as needed.  guaiFENesin-dextromethorphan (ROBITUSSIN DM) 100-10 mg/5 mL syrup Take 5 mL by mouth three (3) times daily as needed for Cough or Congestion.  HYDROcodone-acetaminophen (NORCO)  mg tablet Take 1 Tablet by mouth nightly.  white pet-mineral oil-lanolin (AKWA TEARS) ophthalmic ointment Apply  to eye as needed.  polyethylene glycol (Miralax) 17 gram packet Take 17 g by mouth as needed for Constipation. SAFETY:  After treatment position/precautions:  · Upright in bed  · daughter at bedside   · Nurse notified    Total Treatment Duration:   Time In: 4276  Time Out: [address] [id number].  Keri Hall MS, CCC-SLP         Speech Language Pathologist          [facility]                   Contact: Ken

## 2022-03-13 NOTE — PROGRESS NOTES
Care Management Interventions  PCP Verified by CM: Yes  Mode of Transport at Discharge: Other (see comment)  Transition of Care Consult (CM Consult): [address] [facility] [address] [address]: Yes  Physical Therapy Consult: Yes  Occupational Therapy Consult: Yes  Support Systems: Child(len)  Confirm Follow Up Transport: Family  The Plan for Transition of Care is Related to the Following Treatment Goals : increase endurance  The Patient and/or Patient Representative was Provided with a Choice of Provider and Agrees with the Discharge Plan?: Yes  Name of the Patient Representative Who was Provided with a Choice of Provider and Agrees with the Discharge Plan: Patient daughter  Freedom of Choice List was Provided with Basic Dialogue that Supports the Patient's Individualized Plan of Care/Goals, Treatment Preferences and Shares the Quality Data Associated with the Providers?: Yes  Discharge Location  Patient Expects to be Discharged to[de-identified] Unable to determine at this time  80 yr-old MF with hx CHF admitted with weakness. Lives at home with spouse who has stage IV melanoma. Currently observation status. She was here 3/8-3/10 for CHF and discharged to home with [facility]. [facility] RN told her to go to the hospital due to inability to manage at home. Dtr Ricky Rose ([phone]) is here in room with the patient. Another dtr, who lives out of town, is currently at home with pts spouse. Pt is ambulating very slowly with rolling walker and stand-by assistance. Ricky Rose wants her mother to go to rehab to get stronger then go to assisted living. We discussed pts current status as Observation (pt has Medicare + ), and Ricky Rose understands that rehab requires a 3-night inpatient stay. We discussed home caregivers as possible option should pt not get upgraded to inpatient in addition to home health services. [employer] and caregiver brochures were provided. SW will continue to follow.

## 2022-03-14 ENCOUNTER — HOME HEALTH ADMISSION (OUTPATIENT)
Dept: HOME HEALTH SERVICES | Facility: HOME HEALTH | Age: 82
End: 2022-03-14

## 2022-03-14 ENCOUNTER — APPOINTMENT (OUTPATIENT)
Dept: MRI IMAGING | Age: 82
DRG: 641 | End: 2022-03-14
Attending: FAMILY MEDICINE
Payer: MEDICARE

## 2022-03-14 PROBLEM — R13.19 ESOPHAGEAL DYSPHAGIA: Status: ACTIVE | Noted: 2022-03-14

## 2022-03-14 PROBLEM — R41.0 DELIRIUM: Status: ACTIVE | Noted: 2022-03-14

## 2022-03-14 PROBLEM — Z71.89 DO NOT RESUSCITATE DISCUSSION: Status: ACTIVE | Noted: 2022-03-14

## 2022-03-14 PROBLEM — E87.8 ELECTROLYTE OR FLUID DISORDER: Status: ACTIVE | Noted: 2022-03-14

## 2022-03-14 PROBLEM — E87.6 HYPOKALEMIA: Status: ACTIVE | Noted: 2022-03-14

## 2022-03-14 PROBLEM — K64.8 OTHER HEMORRHOIDS: Status: ACTIVE | Noted: 2020-01-16

## 2022-03-14 PROBLEM — R53.81 DEBILITATED PATIENT: Status: ACTIVE | Noted: 2022-03-12

## 2022-03-14 PROBLEM — J32.8 OTHER CHRONIC SINUSITIS: Status: RESOLVED | Noted: 2019-01-25 | Resolved: 2022-03-14

## 2022-03-14 PROBLEM — E44.0 MODERATE PROTEIN-CALORIE MALNUTRITION (HCC): Status: ACTIVE | Noted: 2022-03-14

## 2022-03-14 PROBLEM — F39 MOOD DISORDER (HCC): Status: ACTIVE | Noted: 2022-03-14

## 2022-03-14 PROBLEM — E83.42 HYPOMAGNESEMIA: Status: ACTIVE | Noted: 2022-03-14

## 2022-03-14 PROBLEM — R63.4 WEIGHT LOSS, UNINTENTIONAL: Status: ACTIVE | Noted: 2022-03-14

## 2022-03-14 PROBLEM — E44.0 MODERATE PROTEIN-CALORIE MALNUTRITION (HCC): Chronic | Status: ACTIVE | Noted: 2022-03-14

## 2022-03-14 PROBLEM — Z66 DO NOT RESUSCITATE: Chronic | Status: ACTIVE | Noted: 2022-03-14

## 2022-03-14 LAB
ALBUMIN SERPL-MCNC: 2.7 G/DL (ref 3.2–4.6)
ALBUMIN/GLOB SERPL: 0.7 {RATIO} (ref 1.2–3.5)
ALP SERPL-CCNC: 77 U/L (ref 50–136)
ALT SERPL-CCNC: 19 U/L (ref 12–65)
ANION GAP SERPL CALC-SCNC: 5 MMOL/L (ref 7–16)
AST SERPL-CCNC: 24 U/L (ref 15–37)
BASOPHILS # BLD: 0.1 K/UL (ref 0–0.2)
BASOPHILS NFR BLD: 1 % (ref 0–2)
BILIRUB DIRECT SERPL-MCNC: 0.2 MG/DL
BILIRUB SERPL-MCNC: 0.4 MG/DL (ref 0.2–1.1)
BUN SERPL-MCNC: 24 MG/DL (ref 8–23)
CALCIUM SERPL-MCNC: 9.1 MG/DL (ref 8.3–10.4)
CHLORIDE SERPL-SCNC: 99 MMOL/L (ref 98–107)
CHOLEST SERPL-MCNC: 160 MG/DL
CO2 SERPL-SCNC: 28 MMOL/L (ref 21–32)
CREAT SERPL-MCNC: 0.76 MG/DL (ref 0.6–1)
DIFFERENTIAL METHOD BLD: ABNORMAL
EOSINOPHIL # BLD: 0.3 K/UL (ref 0–0.8)
EOSINOPHIL NFR BLD: 3 % (ref 0.5–7.8)
ERYTHROCYTE [DISTWIDTH] IN BLOOD BY AUTOMATED COUNT: 17.1 % (ref 11.9–14.6)
EST. AVERAGE GLUCOSE BLD GHB EST-MCNC: 146 MG/DL
FERRITIN SERPL-MCNC: 28 NG/ML (ref 8–388)
GLOBULIN SER CALC-MCNC: 3.9 G/DL (ref 2.3–3.5)
GLUCOSE SERPL-MCNC: 133 MG/DL (ref 65–100)
HBA1C MFR BLD: 6.7 % (ref 4.2–6.3)
HCT VFR BLD AUTO: 33.6 % (ref 35.8–46.3)
HDLC SERPL-MCNC: 50 MG/DL (ref 40–60)
HDLC SERPL: 3.2 {RATIO}
HGB BLD-MCNC: 10.2 G/DL (ref 11.7–15.4)
IMM GRANULOCYTES # BLD AUTO: 0 K/UL (ref 0–0.5)
IMM GRANULOCYTES NFR BLD AUTO: 0 % (ref 0–5)
IRON SATN MFR SERPL: 7 %
IRON SERPL-MCNC: 39 UG/DL (ref 35–150)
LDLC SERPL CALC-MCNC: 91.4 MG/DL
LYMPHOCYTES # BLD: 1.7 K/UL (ref 0.5–4.6)
LYMPHOCYTES NFR BLD: 21 % (ref 13–44)
MCH RBC QN AUTO: 24.1 PG (ref 26.1–32.9)
MCHC RBC AUTO-ENTMCNC: 30.4 G/DL (ref 31.4–35)
MCV RBC AUTO: 79.4 FL (ref 79.6–97.8)
MONOCYTES # BLD: 1.1 K/UL (ref 0.1–1.3)
MONOCYTES NFR BLD: 13 % (ref 4–12)
NEUTS SEG # BLD: 5.2 K/UL (ref 1.7–8.2)
NEUTS SEG NFR BLD: 63 % (ref 43–78)
NRBC # BLD: 0 K/UL (ref 0–0.2)
PLATELET # BLD AUTO: 150 K/UL (ref 150–450)
PMV BLD AUTO: 11.1 FL (ref 9.4–12.3)
POTASSIUM SERPL-SCNC: 4 MMOL/L (ref 3.5–5.1)
PROT SERPL-MCNC: 6.6 G/DL (ref 6.3–8.2)
RBC # BLD AUTO: 4.23 M/UL (ref 4.05–5.2)
SODIUM SERPL-SCNC: 132 MMOL/L (ref 136–145)
TIBC SERPL-MCNC: 535 UG/DL (ref 250–450)
TRIGL SERPL-MCNC: 93 MG/DL (ref 35–150)
TSH SERPL DL<=0.005 MIU/L-ACNC: 0.96 UIU/ML
VLDLC SERPL CALC-MCNC: 18.6 MG/DL (ref 6–23)
WBC # BLD AUTO: 8.3 K/UL (ref 4.3–11.1)

## 2022-03-14 PROCEDURE — 97530 THERAPEUTIC ACTIVITIES: CPT

## 2022-03-14 PROCEDURE — 74011250637 HC RX REV CODE- 250/637: Performed by: FAMILY MEDICINE

## 2022-03-14 PROCEDURE — 94760 N-INVAS EAR/PLS OXIMETRY 1: CPT

## 2022-03-14 PROCEDURE — 73721 MRI JNT OF LWR EXTRE W/O DYE: CPT

## 2022-03-14 PROCEDURE — 2709999900 HC NON-CHARGEABLE SUPPLY

## 2022-03-14 PROCEDURE — 80076 HEPATIC FUNCTION PANEL: CPT

## 2022-03-14 PROCEDURE — 74011000250 HC RX REV CODE- 250: Performed by: FAMILY MEDICINE

## 2022-03-14 PROCEDURE — 77010033678 HC OXYGEN DAILY

## 2022-03-14 PROCEDURE — 96372 THER/PROPH/DIAG INJ SC/IM: CPT

## 2022-03-14 PROCEDURE — 80048 BASIC METABOLIC PNL TOTAL CA: CPT

## 2022-03-14 PROCEDURE — 83036 HEMOGLOBIN GLYCOSYLATED A1C: CPT

## 2022-03-14 PROCEDURE — 84443 ASSAY THYROID STIM HORMONE: CPT

## 2022-03-14 PROCEDURE — 74011000250 HC RX REV CODE- 250: Performed by: EMERGENCY MEDICINE

## 2022-03-14 PROCEDURE — 80061 LIPID PANEL: CPT

## 2022-03-14 PROCEDURE — 85025 COMPLETE CBC W/AUTO DIFF WBC: CPT

## 2022-03-14 PROCEDURE — 36415 COLL VENOUS BLD VENIPUNCTURE: CPT

## 2022-03-14 PROCEDURE — 82728 ASSAY OF FERRITIN: CPT

## 2022-03-14 PROCEDURE — 94640 AIRWAY INHALATION TREATMENT: CPT

## 2022-03-14 PROCEDURE — 74011250636 HC RX REV CODE- 250/636: Performed by: FAMILY MEDICINE

## 2022-03-14 PROCEDURE — 83540 ASSAY OF IRON: CPT

## 2022-03-14 PROCEDURE — G0378 HOSPITAL OBSERVATION PER HR: HCPCS

## 2022-03-14 RX ORDER — METOPROLOL SUCCINATE 25 MG/1
12.5 TABLET, EXTENDED RELEASE ORAL DAILY
Status: DISCONTINUED | OUTPATIENT
Start: 2022-03-14 | End: 2022-03-15 | Stop reason: HOSPADM

## 2022-03-14 RX ORDER — LISINOPRIL 5 MG/1
2.5 TABLET ORAL DAILY
Status: DISCONTINUED | OUTPATIENT
Start: 2022-03-14 | End: 2022-03-15 | Stop reason: HOSPADM

## 2022-03-14 RX ADMIN — BUDESONIDE AND FORMOTEROL FUMARATE DIHYDRATE 2 PUFF: 160; 4.5 AEROSOL RESPIRATORY (INHALATION) at 20:00

## 2022-03-14 RX ADMIN — ENOXAPARIN SODIUM 40 MG: 100 INJECTION SUBCUTANEOUS at 20:40

## 2022-03-14 RX ADMIN — METOPROLOL SUCCINATE 12.5 MG: 25 TABLET, EXTENDED RELEASE ORAL at 12:09

## 2022-03-14 RX ADMIN — SODIUM CHLORIDE, PRESERVATIVE FREE 10 ML: 5 INJECTION INTRAVENOUS at 21:00

## 2022-03-14 RX ADMIN — CALCIUM CARBONATE 200 MG: 500 TABLET, CHEWABLE ORAL at 09:14

## 2022-03-14 RX ADMIN — ACETAMINOPHEN 650 MG: 325 TABLET, FILM COATED ORAL at 09:18

## 2022-03-14 RX ADMIN — GUAIFENESIN 600 MG: 600 TABLET, EXTENDED RELEASE ORAL at 09:14

## 2022-03-14 RX ADMIN — ACETAMINOPHEN 650 MG: 325 TABLET, FILM COATED ORAL at 20:39

## 2022-03-14 RX ADMIN — PANTOPRAZOLE SODIUM 40 MG: 40 TABLET, DELAYED RELEASE ORAL at 05:31

## 2022-03-14 RX ADMIN — GUAIFENESIN 600 MG: 600 TABLET, EXTENDED RELEASE ORAL at 20:39

## 2022-03-14 RX ADMIN — ENOXAPARIN SODIUM 40 MG: 100 INJECTION SUBCUTANEOUS at 17:03

## 2022-03-14 RX ADMIN — SERTRALINE 50 MG: 50 TABLET, FILM COATED ORAL at 09:14

## 2022-03-14 RX ADMIN — PRAVASTATIN SODIUM 40 MG: 20 TABLET ORAL at 21:04

## 2022-03-14 RX ADMIN — LISINOPRIL 2.5 MG: 5 TABLET ORAL at 12:09

## 2022-03-14 RX ADMIN — ASPIRIN 81 MG: 81 TABLET, CHEWABLE ORAL at 09:14

## 2022-03-14 RX ADMIN — BUMETANIDE 1 MG: 1 TABLET ORAL at 09:14

## 2022-03-14 RX ADMIN — BUDESONIDE AND FORMOTEROL FUMARATE DIHYDRATE 2 PUFF: 160; 4.5 AEROSOL RESPIRATORY (INHALATION) at 08:45

## 2022-03-14 NOTE — PROGRESS NOTES
[facility] Progress Note         Admit Date:  3/12/2022    Today's Date:  3/14/2022    CC:  GERD, dysphagia    Subjective:     Patient is seen in MRI - hip evaluation after recent fall. She denies any abd pain, nausea, or vomiting today. She feels the heartburn has decreased. She is on O2 and feels the SOB is stable.     Medications:   Current Facility-Administered Medications   Medication Dose Route Frequency    lisinopriL (PRINIVIL, ZESTRIL) tablet 2.5 mg  2.5 mg Oral DAILY    metoprolol succinate (TOPROL-XL) XL tablet 12.5 mg  12.5 mg Oral DAILY    sodium chloride (NS) flush 5-10 mL  5-10 mL IntraVENous Q8H    sodium chloride (NS) flush 5-10 mL  5-10 mL IntraVENous PRN    [Held by provider] amLODIPine (NORVASC) tablet 10 mg  10 mg Oral DAILY    aspirin chewable tablet 81 mg  81 mg Oral DAILY    budesonide-formoteroL (SYMBICORT) 160-4.5 mcg/actuation HFA inhaler 2 Puff  2 Puff Inhalation BID RT    bumetanide (BUMEX) tablet 1 mg  1 mg Oral DAILY    calcium carbonate (TUMS) chewable tablet 200 mg [elemental]  200 mg Oral DAILY    enoxaparin (LOVENOX) injection 40 mg  40 mg SubCUTAneous Q12H    guaiFENesin ER (MUCINEX) tablet 600 mg  600 mg Oral Q12H    pantoprazole (PROTONIX) tablet 40 mg  40 mg Oral ACB    polyethylene glycol (MIRALAX) packet 17 g  17 g Oral DAILY    pravastatin (PRAVACHOL) tablet 40 mg  40 mg Oral QHS    sertraline (ZOLOFT) tablet 50 mg  50 mg Oral DAILY    sodium chloride (NS) flush 5-10 mL  5-10 mL IntraVENous Q8H    acetaminophen (TYLENOL) tablet 650 mg  650 mg Oral Q6H PRN    Or    acetaminophen (TYLENOL) suppository 650 mg  650 mg Rectal Q6H PRN    ALPRAZolam (XANAX) tablet 0.5 mg  0.5 mg Oral TID PRN    promethazine (PHENERGAN) tablet 12.5 mg  12.5 mg Oral Q6H PRN    Or    ondansetron (ZOFRAN) injection 4 mg  4 mg IntraVENous Q6H PRN    sodium chloride (NS) flush 5-10 mL  5-10 mL IntraVENous PRN       Review of Systems:  ROS was obtained, with pertinent positives as listed above. No chest pain. Diet:  NPO except meds with sips of water    Objective:   Vitals:  Visit Vitals  /60   Pulse 96   Temp 99.4 °F (37.4 °C)   Resp 18   Ht 5' 5\" (1.651 m)   Wt 111.6 kg (246 lb)   SpO2 93%   BMI 40.94 kg/m²     Intake/Output:  No intake/output data recorded. No intake/output data recorded. Exam:  General appearance: alert, cooperative, no distress, lying in bed in MRI, O2 NC in place  Lungs: coarse BS to auscultation bilaterally anteriorly  Heart: regular rate and rhythm, murmur noted  Abdomen: soft, mildly tender over LLQ. Bowel sounds normal. No masses, no organomegaly  Neuro:  alert and oriented    Data Review (Labs):    Recent Labs     03/14/22  0415 03/13/22  0358 03/12/22  1847   WBC 8.3 7.9 9.4   HGB 10.2* 10.3* 10.7*   HCT 33.6* 33.9* 34.9*    292 292   MCV 79.4* 78.7* 78.3*   * 134* 134*   K 4.0 3.0* 3.1*   CL 99 95* 93*   CO2 28 33* 33*   BUN 24* 30* 30*   CREA 0.76 0.97 0.90   CA 9.1 9.2 8.1*   MG  --  0.8* 1.5*   * 139* 76   AP 77  --  87   AST 24  --  25   ALT 19  --  24   TBILI 0.4  --  0.5   CBIL 0.2  --   --    ALB 2.7*  --  3.1*   TP 6.6  --  7.5   LPSE  --   --  62*      Ref. Range 3/12/2022 18:47 3/14/2022 04:15   NT pro-BNP Latest Ref Range: <450 PG/ (H)    Hemoglobin A1c, (calculated) Latest Ref Range: 4.20 - 6.30 %  6.7 (H)   Est. average glucose Latest Units: mg/dL  146   Iron Latest Ref Range: 35 - 150 ug/dL  39   TIBC Latest Ref Range: 250 - 450 ug/dL  535 (H)   Transferrin Saturation Latest Units: %  7   Ferritin Latest Ref Range: 8 - 388 NG/ML  28     RUQ US 3/12/22  FINDINGS:   - Liver: The liver is normal in size at 15.7 cm. There are 3 cysts in the left  lobe, the largest measures 6.5 x 4.2 x 7.0 cm and contains an internal  septation. The other 2 simple cysts measure 1.2 x 0.9 x 0.9 cm and 4.8 x 3.5 x  4.5 cm. No solid liver mass or intrahepatic biliary dilatation is seen. - Gallbladder:  There is mild nonspecific gallbladder wall thickening, measuring  3.2 mm. No gallstones are identified. There is no pericholecystic fluid. - Bile ducts: Within normal limits.  The common bile duct measures 5.1 mm.  - Pancreas: Within normal limits. - Right kidney: Within normal limits. - Aorta and IVC: Within normal limits. - Portal vein: Within normal limits.  - Other: No ascites.   IMPRESSION  1. Three liver cysts, the largest containing a thin internal septation. 2. Mild nonspecific gallbladder wall thickening. Assessment:     Principal Problem:    Esophageal dysphagia (3/14/2022)    Active Problems:    Electrolyte or fluid disorder (3/14/2022)      Mood disorder ([address] (3/14/2022)      Class 3 severe obesity due to excess calories with serious comorbidity and body mass index (BMI) of 40.0 to 44.9 in adult [facility]) (3/10/2022)      Primary hypertension (3/8/2022)      Lymphedema of both lower extremities (5/2/2016)      CAREN (obstructive sleep apnea) (1/4/2016)      Overview: May 22, 2007 but night study demonstrated mild CAREN with AHI 10/h, PLM       index 10/h, titrated CPAP 8 cm H2O with residual RDI 6.6/h. Mild persistent asthma without complication (2/92/1469)      Overview: Spirometry without obstruction, normal FVC and FEV1. DLCO is normal, not       corrected for hgb.       Gastroesophageal reflux disease without esophagitis (1/16/2020)      Overview: currently on aciphex daily      Chronic diastolic heart failure ([address] (1/4/2016)      Debilitated patient (3/12/2022)      Fall (3/13/2022)      Hypokalemia (3/14/2022)      Hypomagnesemia (3/14/2022)      Weight loss, unintentional (3/14/2022)      Moderate protein-calorie malnutrition ([address] (3/14/2022)      Delirium (3/14/2022)    79 yo female, follows with Dr. Aleksander Castellanos with [facility], with PMH including but not limited to CHF with preserved EF, HTN, sleep apnea, who was seen in consultation 13 March 2022 at the request of Dr. Shannan Lr for GERD and dysphagia, who was admitted for worsening PALACIOS and now on CPAP, following recent admission for CHF exacerbation and discharged on 3L O2 and diuretics (Cr normal but K low). Speech has seen and reports no s/sx aspiration on Bedside swallow. She has been having reflux, solid food dysphagia, and poor po intake x one month. Mild microcytic anemia and elevated BUN/Cr ratio noted. RUQ US obtained in ED due to TTP RUQ on exam with nonspecific GB wall thickening and liver cysts, normal LFTs/lipase on labs. Iron studies are trending toward iron def. She had MRI hip today for evaluation s/p recent fall. Plan:     - Supportive care, IVF, maintain electrolytes. - Monitor hgb and transfuse PRN if hgb <7.  - Continue Protonix 40mg daily.  - Hold lovenox if hgb drops or overt bleed noted. - Plan EGD when respiratory status improves. - Follow. Patient is seen and examined in collaboration with Dr. Twan Garcia. Assessment and plan as per Dr. Satnam Galeano. [facility]  [facility]

## 2022-03-14 NOTE — PROGRESS NOTES
Hospitalist Progress Note   Admit Date:  3/12/2022  6:26 PM   Name:  Freeman Almaguer   Age:  80 y.o. Sex:  female  :  1940   MRN:  [id number]   Room:  [facility]/    Presenting Complaint: Shortness of Breath    Reason(s) for Admission: Weakness [R53.1]     Hospital Course & Interval History:   82F PMHx CHF, HTN, COPD, and recent hospitalization for CHF exacerbation who presented to ED with weakness. Patient was discharged to home on 3/10/22 with home health. [facility] RN told her and family that she appeared ill and needed to go back to the hospital.  She reports continued SOB, however she is not hypoxic, even on ambulation in the ER. No worsening edema. Lab work is stable. No fevers. Given weakness and potential need for STR, Hospitalist consulted for admission. Patient mentions a few episodes of vomiting at home, and continued R hip pain after a fall 3 weeks ago, so ER has pending XR and US. Subjective/24hr Events (22): Delirium and weakness overnight. Could not take 2 steps to toilet without assistance. Visual hallucinations. No change in urine output or respiratory function. ROS:  10 systems reviewed and negative except as noted above. Assessment & Plan:   * Esophageal dysphagia  Globus sensation or decreased po intake and weight loss  -consult gastroenterology    Fall  X-ray right hip negative. Still pain with ambulation.   -MRI right hip    Moderate protein-calorie malnutrition ([address]  -consult nutrition services    Weight loss, unintentional  5 kg weight loss over prior month    Debilitated patient  Easily fatigued, No focal deficits,  Likely somewhat deconditioned given recent hospitalized  - Patient had wished to go home 2 days ago upon discharge, but may benefit from STR  - PT/OT consulted  - PPD ordered  - Consult to Case Management    Delirium  Sundowning overnight with hallucinations and confusion  -avoid psychoactive medications    Mood disorder (HCC)  Worsening depression with decreased appetite, anhedonia, sleep disturbance  -Sertraline    Class 3 severe obesity due to excess calories with serious comorbidity and body mass index (BMI) of 40.0 to 44.9 in [facility])  Increased risk of all cause mortality, complicating care  - healthy lifestyle at discharge    Chronic diastolic heart failure ([facility]  Not in exacerbation; Stable on home 3lpm  - bumetanide, lisinopril, metoprolol    Mild persistent asthma without complication  Stable on home O2  -Budesonide formoterol    CAREN (obstructive sleep apnea)  - CPAP qhs    Lymphedema of both lower extremities  - Chronic, not worsened since discharge    Primary hypertension  -lisinopril, metoprolol         Dispo/Discharge Planning:    Rehab or home tomorrow    Diet:  DIET NPO Sips of Water with Meds  DVT PPx: Enoxaparin  Code status: Full Code    Hospital Problems as of 3/14/2022 Date Reviewed: 3/10/2022          Codes Class Noted - Resolved POA    * (Principal) Esophageal dysphagia ICD-10-CM: R13.19  ICD-9-CM: 787.29  3/14/2022 - Present Yes        Fall ICD-10-CM: W19. Colon Areola  ICD-9-CM: E888.9  3/13/2022 - Present Yes        Weight loss, unintentional ICD-10-CM: R63.4  ICD-9-CM: 783.21  3/14/2022 - Present Yes        Moderate protein-calorie malnutrition (HCC) (Chronic) ICD-10-CM: E44.0  ICD-9-CM: 263.0  3/14/2022 - Present Yes        Debilitated patient ICD-10-CM: R53.81  ICD-9-CM: 799.3  3/12/2022 - Present Yes        Electrolyte or fluid disorder ICD-10-CM: E87.8  ICD-9-CM: 276.9  3/14/2022 - Present Yes        Mood disorder ([facility] ICD-10-CM: F39  ICD-9-CM: 296.90  3/14/2022 - Present Yes        Class 3 severe obesity due to excess calories with serious comorbidity and body mass index (BMI) of 40.0 to 44.9 in [facility]) (Chronic) ICD-10-CM: E66.01, Z68.41  ICD-9-CM: 278.01, V85.41  3/10/2022 - Present Yes        Hypokalemia ICD-10-CM: E87.6  ICD-9-CM: 276.8  3/14/2022 - Present Yes        Hypomagnesemia ICD-10-CM: E83.42  ICD-9-CM: 275.2  3/14/2022 - Present Yes        Primary hypertension (Chronic) ICD-10-CM: I10  ICD-9-CM: 401.9  3/8/2022 - Present Yes        Gastroesophageal reflux disease without esophagitis ICD-10-CM: K21.9  ICD-9-CM: 530.81  1/16/2020 - Present Yes    Overview Addendum 3/10/2022 10:49 AM by Amrit Kebede MD     currently on aciphex daily             Mild persistent asthma without complication (Chronic) XWS-34-TD: J45.30  ICD-9-CM: 493.90  3/14/2018 - Present Yes    Overview Addendum 3/10/2022 10:49 AM by Amrit Kebede MD     Spirometry without obstruction, normal FVC and FEV1. DLCO is normal, not corrected for hgb. Lymphedema of both lower extremities (Chronic) ICD-10-CM: I89.0  ICD-9-CM: 457.1  5/2/2016 - Present Yes        CAREN (obstructive sleep apnea) ICD-10-CM: G47.33  ICD-9-CM: 327.23  1/4/2016 - Present Yes    Overview Addendum 3/10/2022 10:50 AM by Amrit Kebede MD     May 22, 2007 but night study demonstrated mild CAREN with AHI 10/h, PLM index 10/h, titrated CPAP 8 cm H2O with residual RDI 6.6/h.              Chronic diastolic heart failure (HCC) (Chronic) ICD-10-CM: I50.32  ICD-9-CM: 428.32  1/4/2016 - Present Yes              Objective:     Patient Vitals for the past 24 hrs:   Temp Pulse Resp BP SpO2   03/14/22 0845     96 %   03/14/22 0741 100.2 °F (37.9 °C) 100 18 (!) 125/58 90 %   03/14/22 0319 99.3 °F (37.4 °C) 95 19 (!) 145/76 90 %   03/13/22 2311 98.1 °F (36.7 °C) 89 18 (!) 141/75 90 %   03/13/22 2105     95 %   03/13/22 1942 99.8 °F (37.7 °C) 81 17 125/62 95 %   03/13/22 1500 97.5 °F (36.4 °C) 84 18 (!) 131/54 95 %   03/13/22 1200  89 16 (!) 123/54 95 %     Oxygen Therapy  O2 Sat (%): 96 % (03/14/22 0845)  Pulse via Oximetry: 101 beats per minute (03/14/22 0845)  O2 Device: Nasal cannula (3L Home O2 Prescription) (03/14/22 0845)  O2 Flow Rate (L/min): 3 l/min (03/14/22 0845)    Estimated body mass index is 40.94 kg/m² as calculated from the following:    Height as of this encounter: 5' 5\" (1.651 m). Weight as of this encounter: 111.6 kg (246 lb). No intake or output data in the 24 hours ending 03/14/22 0921      Blood pressure (!) 125/58, pulse 100, temperature 100.2 °F (37.9 °C), resp. rate 18, height 5' 5\" (1.651 m), weight 111.6 kg (246 lb), SpO2 96 %. Physical Exam  Vitals and nursing note reviewed. Constitutional:       General: She is not in acute distress. Appearance: Normal appearance. She is obese. HENT:      Head: Normocephalic. Eyes:      Extraocular Movements: Extraocular movements intact. Cardiovascular:      Rate and Rhythm: Normal rate. Pulses:           Radial pulses are 2+ on the left side. Pulmonary:      Effort: Pulmonary effort is normal. No respiratory distress. Musculoskeletal:         General: Tenderness and signs of injury present. No deformity. Cervical back: No rigidity. Right lower leg: Edema present. Left lower leg: Edema present. Skin:     General: Skin is warm and dry. Findings: Bruising present. Neurological:      General: No focal deficit present. Mental Status: She is alert and oriented to person, place, and time. Psychiatric:         Mood and Affect: Mood is depressed. Behavior: Behavior is withdrawn. Behavior is cooperative.          I have reviewed ordered lab tests and independently visualized imaging below:    Recent Labs:  Recent Results (from the past 48 hour(s))   EKG, 12 LEAD, INITIAL    Collection Time: 03/12/22  6:28 PM   Result Value Ref Range    Ventricular Rate 75 BPM    Atrial Rate 75 BPM    P-R Interval 198 ms    QRS Duration 100 ms    Q-T Interval 408 ms    QTC Calculation (Bezet) 455 ms    Calculated P Axis 71 degrees    Calculated R Axis 17 degrees    Calculated T Axis 55 degrees    Diagnosis       Normal sinus rhythm  ST abnormality, possible digitalis effect  Abnormal ECG  When compared with ECG of 12-MAR-2022 16:29,  Borderline criteria for Lateral infarct are no longer Present  Nonspecific T wave abnormality has replaced inverted T waves in Anterior   leads  Confirmed by Robby Villegas ([id number]) on 3/13/2022 8:44:23 AM     CBC WITH AUTOMATED DIFF    Collection Time: 03/12/22  6:47 PM   Result Value Ref Range    WBC 9.4 4.3 - 11.1 K/uL    RBC 4.46 4.05 - 5.2 M/uL    HGB 10.7 (L) 11.7 - 15.4 g/dL    HCT 34.9 (L) 35.8 - 46.3 %    MCV 78.3 (L) 79.6 - 97.8 FL    MCH 24.0 (L) 26.1 - 32.9 PG    MCHC 30.7 (L) 31.4 - 35.0 g/dL    RDW 17.2 (H) 11.9 - 14.6 %    PLATELET 877 454 - 033 K/uL    MPV 10.9 9.4 - 12.3 FL    ABSOLUTE NRBC 0.00 0.0 - 0.2 K/uL    DF AUTOMATED      NEUTROPHILS 61 43 - 78 %    LYMPHOCYTES 22 13 - 44 %    MONOCYTES 10 4.0 - 12.0 %    EOSINOPHILS 6 0.5 - 7.8 %    BASOPHILS 1 0.0 - 2.0 %    IMMATURE GRANULOCYTES 0 0.0 - 5.0 %    ABS. NEUTROPHILS 5.7 1.7 - 8.2 K/UL    ABS. LYMPHOCYTES 2.1 0.5 - 4.6 K/UL    ABS. MONOCYTES 1.0 0.1 - 1.3 K/UL    ABS. EOSINOPHILS 0.6 0.0 - 0.8 K/UL    ABS. BASOPHILS 0.1 0.0 - 0.2 K/UL    ABS. IMM. GRANS. 0.0 0.0 - 0.5 K/UL   METABOLIC PANEL, COMPREHENSIVE    Collection Time: 03/12/22  6:47 PM   Result Value Ref Range    Sodium 134 (L) 136 - 145 mmol/L    Potassium 3.1 (L) 3.5 - 5.1 mmol/L    Chloride 93 (L) 98 - 107 mmol/L    CO2 33 (H) 21 - 32 mmol/L    Anion gap 8 7 - 16 mmol/L    Glucose 76 65 - 100 mg/dL    BUN 30 (H) 8 - 23 MG/DL    Creatinine 0.90 0.6 - 1.0 MG/DL    GFR est AA >60 >60 ml/min/1.73m2    GFR est non-AA >60 >60 ml/min/1.73m2    Calcium 8.1 (L) 8.3 - 10.4 MG/DL    Bilirubin, total 0.5 0.2 - 1.1 MG/DL    ALT (SGPT) 24 12 - 65 U/L    AST (SGOT) 25 15 - 37 U/L    Alk.  phosphatase 87 50 - 130 U/L    Protein, total 7.5 6.3 - 8.2 g/dL    Albumin 3.1 (L) 3.2 - 4.6 g/dL    Globulin 4.4 (H) 2.3 - 3.5 g/dL    A-G Ratio 0.7 (L) 1.2 - 3.5     MAGNESIUM    Collection Time: 03/12/22  6:47 PM   Result Value Ref Range    Magnesium 1.5 (L) 1.8 - 2.4 mg/dL   NT-PRO BNP    Collection Time: 03/12/22  6:47 PM   Result Value Ref Range    NT pro- (H) <450 PG/ML   LIPASE    Collection Time: 03/12/22  6:47 PM   Result Value Ref Range    Lipase 62 (L) 73 - 393 U/L   CBC WITH AUTOMATED DIFF    Collection Time: 03/13/22  3:58 AM   Result Value Ref Range    WBC 7.9 4.3 - 11.1 K/uL    RBC 4.31 4.05 - 5.2 M/uL    HGB 10.3 (L) 11.7 - 15.4 g/dL    HCT 33.9 (L) 35.8 - 46.3 %    MCV 78.7 (L) 79.6 - 97.8 FL    MCH 23.9 (L) 26.1 - 32.9 PG    MCHC 30.4 (L) 31.4 - 35.0 g/dL    RDW 17.1 (H) 11.9 - 14.6 %    PLATELET 579 981 - 199 K/uL    MPV 11.1 9.4 - 12.3 FL    ABSOLUTE NRBC 0.00 0.0 - 0.2 K/uL    DF AUTOMATED      NEUTROPHILS 53 43 - 78 %    LYMPHOCYTES 27 13 - 44 %    MONOCYTES 10 4.0 - 12.0 %    EOSINOPHILS 8 (H) 0.5 - 7.8 %    BASOPHILS 1 0.0 - 2.0 %    IMMATURE GRANULOCYTES 0 0.0 - 5.0 %    ABS. NEUTROPHILS 4.2 1.7 - 8.2 K/UL    ABS. LYMPHOCYTES 2.2 0.5 - 4.6 K/UL    ABS. MONOCYTES 0.8 0.1 - 1.3 K/UL    ABS. EOSINOPHILS 0.6 0.0 - 0.8 K/UL    ABS. BASOPHILS 0.1 0.0 - 0.2 K/UL    ABS. IMM.  GRANS. 0.0 0.0 - 0.5 K/UL   METABOLIC PANEL, BASIC    Collection Time: 03/13/22  3:58 AM   Result Value Ref Range    Sodium 134 (L) 136 - 145 mmol/L    Potassium 3.0 (L) 3.5 - 5.1 mmol/L    Chloride 95 (L) 98 - 107 mmol/L    CO2 33 (H) 21 - 32 mmol/L    Anion gap 6 (L) 7 - 16 mmol/L    Glucose 139 (H) 65 - 100 mg/dL    BUN 30 (H) 8 - 23 MG/DL    Creatinine 0.97 0.6 - 1.0 MG/DL    GFR est AA >60 >60 ml/min/1.73m2    GFR est non-AA 58 (L) >60 ml/min/1.73m2    Calcium 9.2 8.3 - 10.4 MG/DL   MAGNESIUM    Collection Time: 03/13/22  3:58 AM   Result Value Ref Range    Magnesium 0.8 (L) 1.8 - 2.4 mg/dL   CBC WITH AUTOMATED DIFF    Collection Time: 03/14/22  4:15 AM   Result Value Ref Range    WBC 8.3 4.3 - 11.1 K/uL    RBC 4.23 4.05 - 5.2 M/uL    HGB 10.2 (L) 11.7 - 15.4 g/dL    HCT 33.6 (L) 35.8 - 46.3 %    MCV 79.4 (L) 79.6 - 97.8 FL    MCH 24.1 (L) 26.1 - 32.9 PG    MCHC 30.4 (L) 31.4 - 35.0 g/dL    RDW 17.1 (H) 11.9 - 14.6 %    PLATELET 632 150 - 450 K/uL    MPV 11.1 9.4 - 12.3 FL    ABSOLUTE NRBC 0.00 0.0 - 0.2 K/uL    DF AUTOMATED      NEUTROPHILS 63 43 - 78 %    LYMPHOCYTES 21 13 - 44 %    MONOCYTES 13 (H) 4.0 - 12.0 %    EOSINOPHILS 3 0.5 - 7.8 %    BASOPHILS 1 0.0 - 2.0 %    IMMATURE GRANULOCYTES 0 0.0 - 5.0 %    ABS. NEUTROPHILS 5.2 1.7 - 8.2 K/UL    ABS. LYMPHOCYTES 1.7 0.5 - 4.6 K/UL    ABS. MONOCYTES 1.1 0.1 - 1.3 K/UL    ABS. EOSINOPHILS 0.3 0.0 - 0.8 K/UL    ABS. BASOPHILS 0.1 0.0 - 0.2 K/UL    ABS. IMM. GRANS. 0.0 0.0 - 0.5 K/UL   METABOLIC PANEL, BASIC    Collection Time: 03/14/22  4:15 AM   Result Value Ref Range    Sodium 132 (L) 136 - 145 mmol/L    Potassium 4.0 3.5 - 5.1 mmol/L    Chloride 99 98 - 107 mmol/L    CO2 28 21 - 32 mmol/L    Anion gap 5 (L) 7 - 16 mmol/L    Glucose 133 (H) 65 - 100 mg/dL    BUN 24 (H) 8 - 23 MG/DL    Creatinine 0.76 0.6 - 1.0 MG/DL    GFR est AA >60 >60 ml/min/1.73m2    GFR est non-AA >60 >60 ml/min/1.73m2    Calcium 9.1 8.3 - 10.4 MG/DL   FERRITIN    Collection Time: 03/14/22  4:15 AM   Result Value Ref Range    Ferritin 28 8 - 388 NG/ML   TRANSFERRIN SATURATION    Collection Time: 03/14/22  4:15 AM   Result Value Ref Range    Iron 39 35 - 150 ug/dL    TIBC 535 (H) 250 - 450 ug/dL    Transferrin Saturation 7 %       All Micro Results     None          Other Studies:  No results found.     Current Meds:  Current Facility-Administered Medications   Medication Dose Route Frequency    lisinopriL (PRINIVIL, ZESTRIL) tablet 2.5 mg  2.5 mg Oral DAILY    metoprolol succinate (TOPROL-XL) XL tablet 12.5 mg  12.5 mg Oral DAILY    traZODone (DESYREL) tablet 50 mg  50 mg Oral QHS    sodium chloride (NS) flush 5-10 mL  5-10 mL IntraVENous Q8H    sodium chloride (NS) flush 5-10 mL  5-10 mL IntraVENous PRN    [Held by provider] amLODIPine (NORVASC) tablet 10 mg  10 mg Oral DAILY    aspirin chewable tablet 81 mg  81 mg Oral DAILY    budesonide-formoteroL (SYMBICORT) 160-4.5 mcg/actuation HFA inhaler 2 Puff  2 Puff Inhalation BID RT    bumetanide (BUMEX) tablet 1 mg  1 mg Oral DAILY    calcium carbonate (TUMS) chewable tablet 200 mg [elemental]  200 mg Oral DAILY    enoxaparin (LOVENOX) injection 40 mg  40 mg SubCUTAneous Q12H    guaiFENesin ER (MUCINEX) tablet 600 mg  600 mg Oral Q12H    pantoprazole (PROTONIX) tablet 40 mg  40 mg Oral ACB    polyethylene glycol (MIRALAX) packet 17 g  17 g Oral DAILY    pravastatin (PRAVACHOL) tablet 40 mg  40 mg Oral QHS    sertraline (ZOLOFT) tablet 50 mg  50 mg Oral DAILY    sodium chloride (NS) flush 5-10 mL  5-10 mL IntraVENous Q8H    acetaminophen (TYLENOL) tablet 650 mg  650 mg Oral Q6H PRN    Or    acetaminophen (TYLENOL) suppository 650 mg  650 mg Rectal Q6H PRN    ALPRAZolam (XANAX) tablet 0.5 mg  0.5 mg Oral TID PRN    promethazine (PHENERGAN) tablet 12.5 mg  12.5 mg Oral Q6H PRN    Or    ondansetron (ZOFRAN) injection 4 mg  4 mg IntraVENous Q6H PRN    sodium chloride (NS) flush 5-10 mL  5-10 mL IntraVENous PRN       Signed:  Trinidad Mtz MD

## 2022-03-14 NOTE — PROGRESS NOTES
Care Management Interventions  PCP Verified by CM: Yes  Transition of Care Consult (CM Consult): SNF  Discharge Durable Medical Equipment: No  Physical Therapy Consult: Yes  Occupational Therapy Consult: Yes  Speech Therapy Consult: No  Support Systems: Spouse/Significant Other  Confirm Follow Up Transport: Family  The Plan for Transition of Care is Related to the Following Treatment Goals : would like to rehab  The Patient and/or Patient Representative was Provided with a Choice of Provider and Agrees with the Discharge Plan?: Yes  Name of the Patient Representative Who was Provided with a Choice of Provider and Agrees with the Discharge Plan: erica Price of Choice List was Provided with Basic Dialogue that Supports the Patient's Individualized Plan of Care/Goals, Treatment Preferences and Shares the Quality Data Associated with the Providers?: Yes  Discharge Location  Patient Expects to be Discharged to[de-identified] Skilled nursing facility  Spoke with patient daughter Alba Ga about seeing if waiver for Medicare for observation patients going to SNF and that there may be facility that is able to do waiver and discussed [facility], [address] [place] [address], and [facility]. Daughter has choice list and states that she is fine with any of the above except [facility] and would like [facility] since her dad has been there in the past. CM sent referrals through [facility] and notified liaison for [facility] and [facility]. CM following.

## 2022-03-14 NOTE — PROGRESS NOTES
Comprehensive Nutrition Assessment    Type and Reason for Visit: Initial,Consult  Poor intake/appetite 5 or more days (hospitalists)    Nutrition Recommendations/Plan:   Meals and Snacks:  Continue current diet. Advance as medically appropriate to regular diet  Nutrition Supplement Therapy:   Medical food supplement therapy:  Initiate Ensure Clear three times per day (this provides 240 kcal and 8 grams protein per bottle) Mixed Berry (per pt preference)      Malnutrition Assessment:  Malnutrition Status: At risk for malnutrition (specify) (poor intake for 1.5 weeks)  Context: Chronic illness  Findings of clinical characteristics of malnutrition:   Energy Intake:  Mild decrease in energy intake (specify) (poor intake for 1.5 weeks)    Nutrition Assessment:   Nutrition History: 3/14 per pt and daughter [facility], patient with decreased appetite and intake for past week and a half. Per weight history, may have lost 5 pounds in last 9 months. Weight shifts may also be related to CHF fluid shifts. Nutrition Background: Admitted with esophageal dysphagia. C/o foods feeling stuck (no coughing or choking), early satiety. She sometimes vomited just food not stomach acid. Dx CAREN HTN CHF GERD lymphedema. Nutrition Interval:  Visited pt with daughter [facility] in room. Patient had been eating well prior to 1.5 weeks ago. Now taking 2 or 3 bites and feeling either food stuck mid chest or just full with no appetite. Tolerated about 4 oz chicken broth and a few sips of orange juice this afternoon when diet was advanced. Nutrition Related Findings:   3/14 diet advanced to clear liquid.        Current Nutrition Therapies:  ADULT DIET Clear Liquid    Current Intake:   Average Meal Intake:  (tolerated sips of clear liquids- cannot meet needs on clears) Average Supplement Intake: None ordered      Anthropometric Measures:  Height: 5' 5\" (165.1 cm)  Current Body Wt: 111.6 kg (246 lb 0.5 oz) (3/12), Weight source: Stated  BMI: 40.9, Obese class 3 (BMI 40.0 or greater)  Admission Body Weight: 246 lb 0.5 oz (3/12 stated)  Ideal Body Weight (lbs) (Calculated): 125 lbs (57 kg), 196.8 %  Usual Body Wt: 113.4 kg (250 lb), Percent weight change: -1.6          Edema: No data recorded   Estimated Daily Nutrient Needs:  Energy (kcal/day): 7839-3450 (Kcal/kg (15-20), Weight Used: Current)  Protein (g/day):  (0.8-1) Weight Used: (Current)  Fluid (ml/day):   (1 ml/kcal)    Nutrition Diagnosis:   · Inadequate oral intake related to  (esophageal dysphagia) as evidenced by NPO or clear liquid status due to medical condition    Nutrition Interventions:   Food and/or Nutrient Delivery: Continue current diet,Start oral nutrition supplement (advance diet as medically appropriate)  Nutrition Education/Counseling: Survival skills/brief education completed (recommended easy protein rich foods and ONS to have on hand)  Coordination of Nutrition Care: Continue to monitor while inpatient  Plan of Care discussed with Yo Ramos RN    Goals: Active Goal: Meet at least 75% of estimated needs by next assessment.     Nutrition Monitoring and Evaluation:      Food/Nutrient Intake Outcomes: Diet advancement/tolerance,Food and nutrient intake,Supplement intake  Physical Signs/Symptoms Outcomes: GI status,Nausea/vomiting    Discharge Planning:    Continue oral nutrition supplement    Sahara Heath RD, [place]  Contact: [phone]

## 2022-03-14 NOTE — ACP (ADVANCE CARE PLANNING)
[facility] Hospitalist Service  At the heart of better care     Advance Care Planning   Admit Date:  3/12/2022  6:26 PM   Name:  Macario Oneal   Age:  80 y.o. Sex:  female  :  1940   MRN:  [id number]   Room:  [facility]/    Macario Oneal is able to make her own decisions: Yes    If pt unable to make decisions, POA/surrogate decision maker:  Daughter    Other members present in the meeting:   Daughter    Patient / surrogate decision-maker directed:  Code Status: DO NOT RESUSCITATE -okay to intubate, and other aggressive medical and surgical intervention    Other ACP topics discussed, if applicable:   Has living will at home    Patient or surrogate consented to discussion of the current conditions, workup, management plans, prognosis, and understand the risk for further deterioration. Time spent: 19 minutes in direct discussion (face to face and/or over phone).     Signed:  Benson Hodgkin, MD

## 2022-03-14 NOTE — PROGRESS NOTES
Problem: Falls - Risk of  Goal: *Absence of Falls  Description: Document Chiquis Lam Fall Risk and appropriate interventions in the flowsheet. Outcome: Progressing Towards Goal  Note: Fall Risk Interventions:  Mobility Interventions: Communicate number of staff needed for ambulation/transfer              Elimination Interventions: Call light in reach    History of Falls Interventions: Investigate reason for fall         Problem: Patient Education: Go to Patient Education Activity  Goal: Patient/Family Education  Outcome: Progressing Towards Goal     Problem: Pressure Injury - Risk of  Goal: *Prevention of pressure injury  Description: Document Westley Scale and appropriate interventions in the flowsheet. Outcome: Progressing Towards Goal  Note: Pressure Injury Interventions:  Sensory Interventions: Keep linens dry and wrinkle-free    Moisture Interventions: Check for incontinence Q2 hours and as needed    Activity Interventions: Increase time out of bed    Mobility Interventions: PT/OT evaluation    Nutrition Interventions: Offer support with meals,snacks and hydration    Friction and Shear Interventions: Minimize layers                Problem: Patient Education: Go to Patient Education Activity  Goal: Patient/Family Education  Outcome: Progressing Towards Goal     Problem:  Activity Intolerance  Goal: *Oxygen saturation during activity within specified parameters  Outcome: Progressing Towards Goal  Goal: *Able to remain out of bed as prescribed  Outcome: Progressing Towards Goal     Problem: Patient Education: Go to Patient Education Activity  Goal: Patient/Family Education  Outcome: Progressing Towards Goal     Problem: Airway Clearance - Ineffective  Goal: *Patent airway  Outcome: Progressing Towards Goal  Goal: *Absence of airway secretions  Outcome: Progressing Towards Goal  Goal: *Able to cough effectively  Outcome: Progressing Towards Goal  Goal: *PALLIATIVE CARE:  Alleviation of secretions, cough and/or nasal congestion  Outcome: Progressing Towards Goal     Problem: Patient Education: Go to Patient Education Activity  Goal: Patient/Family Education  Outcome: Progressing Towards Goal     Problem: Patient Education: Go to Patient Education Activity  Goal: Patient/Family Education  Outcome: Progressing Towards Goal     Problem: Patient Education: Go to Patient Education Activity  Goal: Patient/Family Education  Outcome: Progressing Towards Goal

## 2022-03-14 NOTE — PROGRESS NOTES
Problem: Mobility Impaired (Adult and Pediatric)  Goal: *Acute Goals and Plan of Care (Insert Text)  Outcome: Progressing Towards Goal  Note: STG:  (1.)Ms. Arthur Israel will move from supine to sit and sit to supine  with MINIMAL ASSIST within 3 treatment day(s). (2.)Ms. Arthur Israel will transfer from bed to chair and chair to bed with STAND BY ASSIST using the least restrictive device within 3 treatment day(s). (3.)Ms. Arthur Israel will ambulate with STAND BY ASSIST for 25 feet with the least restrictive device within 3 treatment day(s). LTG:  (1.)Ms. Arthur Israel will move from supine to sit and sit to supine  in bed with STAND BY ASSIST within 7 treatment day(s). (2.)Ms. Arthur Israel will transfer from bed to chair and chair to bed with SUPERVISION using the least restrictive device within 7 treatment day(s). (3.)Ms. Arthur Israel will ambulate with SUPERVISION for 50 feet with the least restrictive device within 7 treatment day(s). ________________________________________________________________________________________________       PHYSICAL THERAPY: Daily Note and AM 3/14/2022  OBSERVATION: PT Visit Days : 1  [place] / [place] / Product Type: Medicare /       NAME/AGE/GENDER: Marla Aguirre is a 80 y.o. female   PRIMARY DIAGNOSIS: Weakness [R53.1] Esophageal dysphagia Esophageal dysphagia       ICD-10: Treatment Diagnosis:    · Generalized Muscle Weakness (M62.81)  · Difficulty in walking, Not elsewhere classified (R26.2)   Precaution/Allergies:  Benzalkonium chloride, Gold sodium thiosulfate, Nickel, and Benzalkonium      ASSESSMENT:     Ms. Arthur Israel presents with above diagnosis. Patient demonstrates weakness throughout limiting independence with all transfers and mobility and limiting activity tolerance. She would benefit from therapy to address these deficits. Patient just hospitalized and d/c'd home with [facility] on 3/10.   Patient reports therapy did not start before she returned to the hospital.  Daughter reports patient has been struggling to get around at home and interested in SNF for STR. Patient currently admitted under observation status and not eligible for SNF. She certainly could benefit from SNF if admission status does change considering she did not do well at home following last d/c.    3/14 supine upon arrival.  Work on bed mobility as follows:supine>eob with Mod A with extra time. Sit>stand x 2 with Min A (extra time). Stood up with support from the walker and verbal cues. Min A x 2 (extra time). Ambulated 10 ft to the chair using RW with Min A x 2 and verbal cues. While in the chair she performs B LE exercises with guidance. Left in the chair with needs in reach and instructed to call for assist, before getting up. This section established at most recent assessment   PROBLEM LIST (Impairments causing functional limitations):  1. Decreased Strength  2. Decreased ADL/Functional Activities  3. Decreased Transfer Abilities  4. Decreased Ambulation Ability/Technique  5. Decreased Balance  6. Decreased Activity Tolerance  7. Increased Shortness of Breath   INTERVENTIONS PLANNED: (Benefits and precautions of physical therapy have been discussed with the patient.)  1. Balance Exercise  2. Bed Mobility  3. Family Education  4. Gait Training  5. Home Exercise Program (HEP)  6. Therapeutic Activites  7. Therapeutic Exercise/Strengthening  8. Transfer Training     TREATMENT PLAN: Frequency/Duration: daily for duration of hospital stay  Rehabilitation Potential For Stated Goals: Good     REHAB RECOMMENDATIONS (at time of discharge pending progress):    Placement: It is my opinion, based on this patient's performance to date, that Ms. Melissa Corona may benefit from participating in 1-2 additional therapy sessions in order to continue to assess for rehab potential and then make recommendation for disposition at discharge. HH vs SNF depending on admission status. SNF if available.   Equipment:  None at this time              HISTORY:   History of Present Injury/Illness (Reason for Referral): Kajal Be is a 80 y.o. female with medical history of CHF, HTN, COPD, and recent hospitalization for CHF exacerbation who presented to ED with weakness. Patient was discharged to home on 3/10/22 with home health. [facility] RN told her and family that she appeared ill and needed to go back to the hospital.  She reports continued SOB, however she is not hypoxic, even on ambulation in the ER. No worsening edema. Lab work is stable. No fevers. Given weakness and potential need for STR, Hospitalist consulted for admission. Patient mentions a few episodes of vomiting at home, and continued R hip pain after a fall 3 weeks ago, so ER has pending XR and US. Past Medical History/Comorbidities:   Ms. Jaimie Tate  has a past medical history of Acute on chronic diastolic congestive heart failure ([address] (6/29/2021), JOHN (acute kidney injury) ([address] (3/8/2022), COPD (chronic obstructive pulmonary disease) ([address] Diastolic heart failure ([address] DVT (deep vein thrombosis) in pregnancy, Fibromuscular dysplasia of bilateral renal arteries ([address] GERD (gastroesophageal reflux disease), HLD (hyperlipidemia), HTN (hypertension), CAREN on CPAP, Osteoarthritis, Other chronic sinusitis (1/25/2019), and Renal artery stenosis ([address] Ms. Jaimie Tate  has no past surgical history on file. Social History/Living Environment:   Home Environment: Private residence  Wheelchair Ramp: Yes  One/Two Story Residence: One story  Living Alone: No  Support Systems: Child(len),Spouse/Significant Other  Patient Expects to be Discharged to[de-identified] Unable to determine at this time  Current DME Used/Available at Home: Sarajane Ovi, rollator,Oxygen, portable  Tub or Shower Type: Shower  Prior Level of Function/Work/Activity:  Ambulating with rollator prior to recent admissions.      Number of Personal Factors/Comorbidities that affect the Plan of Care: 1-2: MODERATE COMPLEXITY   EXAMINATION:   Most Recent Physical Functioning:   Gross Assessment:                  Posture:     Balance:  Sitting: Intact  Standing: Pull to stand; With support Bed Mobility:  Supine to Sit: Moderate assistance  Scooting: Moderate assistance  Wheelchair Mobility:     Transfers:  Sit to Stand: Minimum assistance; Additional time;Assist x2  Stand to Sit: Minimum assistance; Additional time;Assist x2  Duration: 40 Minutes  Gait:     Base of Support: Center of gravity altered  Speed/Anne Marie: Pace decreased (<100 feet/min)  Step Length: Left shortened;Right shortened  Gait Abnormalities: Decreased step clearance  Distance (ft): 10 Feet (ft)  Assistive Device: Walker, rolling  Ambulation - Level of Assistance: Minimal assistance  Interventions: Safety awareness training;Verbal cues         Body Structures Involved:  1. Muscles Body Functions Affected:  1. Movement Related Activities and Participation Affected:  1. Mobility   Number of elements that affect the Plan of Care: 3: MODERATE COMPLEXITY   CLINICAL PRESENTATION:   Presentation: Stable and uncomplicated: LOW COMPLEXITY   CLINICAL DECISION MAKING:   [facility] AM-PAC 6 Clicks   Basic Mobility Inpatient Short Form  How much difficulty does the patient currently have. .. Unable A Lot A Little None   1. Turning over in bed (including adjusting bedclothes, sheets and blankets)? [] 1   [x] 2   [] 3   [] 4   2. Sitting down on and standing up from a chair with arms ( e.g., wheelchair, bedside commode, etc.)   [] 1   [] 2   [x] 3   [] 4   3. Moving from lying on back to sitting on the side of the bed? [] 1   [x] 2   [] 3   [] 4   How much help from another person does the patient currently need. .. Total A Lot A Little None   4. Moving to and from a bed to a chair (including a wheelchair)? [] 1   [] 2   [x] 3   [] 4   5. Need to walk in hospital room? [] 1   [] 2   [x] 3   [] 4   6. Climbing 3-5 steps with a railing?    [] 1   [x] 2   [] 3   [] 4   © 2007, Trustees of [facility], under license to [employer]. All rights reserved      Score:  Initial: 15 Most Recent: X (Date: -- )    Interpretation of Tool:  Represents activities that are increasingly more difficult (i.e. Bed mobility, Transfers, Gait). Medical Necessity:     · Patient is expected to demonstrate progress in   · strength, balance, and coordination  ·  to   · increase independence with mobility. · .  Reason for Services/Other Comments:  · Patient continues to require skilled intervention due to   · Decreased strength, mobility, and tolerance for activity. · .   Use of outcome tool(s) and clinical judgement create a POC that gives a: Clear prediction of patient's progress: LOW COMPLEXITY            TREATMENT:   (In addition to Assessment/Re-Assessment sessions the following treatments were rendered)   Pre-treatment Symptoms/Complaints:  patient agreeable. Needing to toilet. Pain: Initial:      Post Session:  0     Therapeutic Activity: (  40 Minutes ):  Therapeutic activities including Bed transfers, Chair transfers, Toilet transfers, Ambulation on level ground, and standing balance for hand hygiene to improve mobility, strength, balance, and coordination. Required minimal Safety awareness training;Verbal cues to promote static and dynamic balance in standing. Gait Training ( ):  Gait training to improve and/or restore physical functioning as related to mobility.   Ambulated 10 Feet (ft) with Minimal assistance using a Walker, rolling and minimal Safety awareness training;Verbal cues    Date:  3/14 Date:   Date:     Activity/Exercise Parameters Parameters Parameters   Ankle pumps 10     LAQ 10     Marching in place 10     Hip ab/ad 10                               Braces/Orthotics/Lines/Etc:   · O2  Treatment/Session Assessment:    · Response to Treatment:    · Interdisciplinary Collaboration: making slow progress  o Registered Nurse  · After treatment position/precautions:   o Up in chair  o Bed/Chair-wheels locked  o Call light within reach  o Family at bedside   · Compliance with Program/Exercises: Will assess as treatment progresses  · Recommendations/Intent for next treatment session: \"Next visit will focus on advancements to more challenging activities and reduction in assistance provided\".   Total Treatment Duration:  PT Patient Time In/Time Out  Time In: 0745  Time Out: 0825    Carmen Khan, PTA

## 2022-03-14 NOTE — PROGRESS NOTES
Care Management Interventions  PCP Verified by CM: Yes  Transition of Care Consult (CM Consult): SNF  Discharge Durable Medical Equipment: No  Physical Therapy Consult: Yes  Occupational Therapy Consult: Yes  Speech Therapy Consult: No  Support Systems: Spouse/Significant Other  Confirm Follow Up Transport: Family  The Plan for Transition of Care is Related to the Following Treatment Goals : would like to rehab  The Patient and/or Patient Representative was Provided with a Choice of Provider and Agrees with the Discharge Plan?: Yes  Name of the Patient Representative Who was Provided with a Choice of Provider and Agrees with the Discharge Plan: daughter David Batters of Choice List was Provided with Basic Dialogue that Supports the Patient's Individualized Plan of Care/Goals, Treatment Preferences and Shares the Quality Data Associated with the Providers?: Yes  Discharge Location  Patient Expects to be Discharged to[de-identified] Skilled nursing facility    PT and dtr accepted bed offer at [facility]. I will call in am to confirm if bed available Tues am if medically ready.

## 2022-03-15 VITALS
TEMPERATURE: 98.9 F | WEIGHT: 246 LBS | SYSTOLIC BLOOD PRESSURE: 120 MMHG | RESPIRATION RATE: 18 BRPM | BODY MASS INDEX: 40.98 KG/M2 | DIASTOLIC BLOOD PRESSURE: 62 MMHG | HEIGHT: 65 IN | OXYGEN SATURATION: 95 % | HEART RATE: 90 BPM

## 2022-03-15 PROBLEM — E87.1 HYPONATREMIA: Status: ACTIVE | Noted: 2022-03-15

## 2022-03-15 PROBLEM — E83.42 HYPOMAGNESEMIA: Status: RESOLVED | Noted: 2022-03-14 | Resolved: 2022-03-15

## 2022-03-15 PROBLEM — Z71.89 DO NOT RESUSCITATE DISCUSSION: Status: RESOLVED | Noted: 2022-03-14 | Resolved: 2022-03-15
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
## 2022-03-15 PROBLEM — E87.6 HYPOKALEMIA: Status: RESOLVED | Noted: 2022-03-14 | Resolved: 2022-03-15

## 2022-03-15 LAB
ANION GAP SERPL CALC-SCNC: 7 MMOL/L (ref 7–16)
APPEARANCE UR: ABNORMAL
BACTERIA URNS QL MICRO: 0 /HPF
BASOPHILS # BLD: 0.1 K/UL (ref 0–0.2)
BASOPHILS NFR BLD: 1 % (ref 0–2)
BILIRUB UR QL: NEGATIVE
BUN SERPL-MCNC: 24 MG/DL (ref 8–23)
CALCIUM SERPL-MCNC: 9.1 MG/DL (ref 8.3–10.4)
CHLORIDE SERPL-SCNC: 98 MMOL/L (ref 98–107)
CO2 SERPL-SCNC: 29 MMOL/L (ref 21–32)
COLOR UR: YELLOW
COVID-19 RAPID TEST, COVR: NOT DETECTED
CREAT SERPL-MCNC: 0.7 MG/DL (ref 0.6–1)
DIFFERENTIAL METHOD BLD: ABNORMAL
EOSINOPHIL # BLD: 0.2 K/UL (ref 0–0.8)
EOSINOPHIL NFR BLD: 2 % (ref 0.5–7.8)
ERYTHROCYTE [DISTWIDTH] IN BLOOD BY AUTOMATED COUNT: 16.9 % (ref 11.9–14.6)
GLUCOSE SERPL-MCNC: 119 MG/DL (ref 65–100)
GLUCOSE UR STRIP.AUTO-MCNC: NEGATIVE MG/DL
HCT VFR BLD AUTO: 32 % (ref 35.8–46.3)
HGB BLD-MCNC: 9.8 G/DL (ref 11.7–15.4)
HGB UR QL STRIP: NEGATIVE
IMM GRANULOCYTES # BLD AUTO: 0 K/UL (ref 0–0.5)
IMM GRANULOCYTES NFR BLD AUTO: 0 % (ref 0–5)
KETONES UR QL STRIP.AUTO: NEGATIVE MG/DL
LACTATE SERPL-SCNC: 1.2 MMOL/L (ref 0.4–2)
LEUKOCYTE ESTERASE UR QL STRIP.AUTO: NEGATIVE
LYMPHOCYTES # BLD: 1.8 K/UL (ref 0.5–4.6)
LYMPHOCYTES NFR BLD: 19 % (ref 13–44)
MAGNESIUM SERPL-MCNC: 1.9 MG/DL (ref 1.8–2.4)
MCH RBC QN AUTO: 24 PG (ref 26.1–32.9)
MCHC RBC AUTO-ENTMCNC: 30.6 G/DL (ref 31.4–35)
MCV RBC AUTO: 78.4 FL (ref 79.6–97.8)
MM INDURATION POC: 0 MM (ref 0–5)
MONOCYTES # BLD: 1.1 K/UL (ref 0.1–1.3)
MONOCYTES NFR BLD: 12 % (ref 4–12)
NEUTS SEG # BLD: 6.2 K/UL (ref 1.7–8.2)
NEUTS SEG NFR BLD: 66 % (ref 43–78)
NITRITE UR QL STRIP.AUTO: NEGATIVE
NRBC # BLD: 0 K/UL (ref 0–0.2)
PH UR STRIP: 5 [PH] (ref 5–9)
PLATELET # BLD AUTO: 266 K/UL (ref 150–450)
PMV BLD AUTO: 11.3 FL (ref 9.4–12.3)
POTASSIUM SERPL-SCNC: 3.3 MMOL/L (ref 3.5–5.1)
PPD POC: NORMAL
PROT UR STRIP-MCNC: 30 MG/DL
RBC # BLD AUTO: 4.08 M/UL (ref 4.05–5.2)
SODIUM SERPL-SCNC: 134 MMOL/L (ref 136–145)
SOURCE, COVRS: NORMAL
SP GR UR REFRACTOMETRY: 1.02 (ref 1–1.02)
UROBILINOGEN UR QL STRIP.AUTO: 0.2 EU/DL (ref 0.2–1)
WBC # BLD AUTO: 9.4 K/UL (ref 4.3–11.1)
WBC URNS QL MICRO: ABNORMAL /HPF

## 2022-03-15 PROCEDURE — 80048 BASIC METABOLIC PNL TOTAL CA: CPT

## 2022-03-15 PROCEDURE — 85025 COMPLETE CBC W/AUTO DIFF WBC: CPT

## 2022-03-15 PROCEDURE — 94760 N-INVAS EAR/PLS OXIMETRY 1: CPT

## 2022-03-15 PROCEDURE — 74011000250 HC RX REV CODE- 250: Performed by: EMERGENCY MEDICINE

## 2022-03-15 PROCEDURE — 96372 THER/PROPH/DIAG INJ SC/IM: CPT

## 2022-03-15 PROCEDURE — 74011000250 HC RX REV CODE- 250: Performed by: FAMILY MEDICINE

## 2022-03-15 PROCEDURE — 83605 ASSAY OF LACTIC ACID: CPT

## 2022-03-15 PROCEDURE — 83735 ASSAY OF MAGNESIUM: CPT

## 2022-03-15 PROCEDURE — 65270000029 HC RM PRIVATE

## 2022-03-15 PROCEDURE — 77010033678 HC OXYGEN DAILY

## 2022-03-15 PROCEDURE — 87086 URINE CULTURE/COLONY COUNT: CPT

## 2022-03-15 PROCEDURE — 87040 BLOOD CULTURE FOR BACTERIA: CPT

## 2022-03-15 PROCEDURE — 74011250637 HC RX REV CODE- 250/637: Performed by: FAMILY MEDICINE

## 2022-03-15 PROCEDURE — 36415 COLL VENOUS BLD VENIPUNCTURE: CPT

## 2022-03-15 PROCEDURE — 74011250636 HC RX REV CODE- 250/636: Performed by: FAMILY MEDICINE

## 2022-03-15 PROCEDURE — 81001 URINALYSIS AUTO W/SCOPE: CPT

## 2022-03-15 PROCEDURE — 87635 SARS-COV-2 COVID-19 AMP PRB: CPT

## 2022-03-15 PROCEDURE — 2709999900 HC NON-CHARGEABLE SUPPLY

## 2022-03-15 PROCEDURE — 94640 AIRWAY INHALATION TREATMENT: CPT

## 2022-03-15 PROCEDURE — 97530 THERAPEUTIC ACTIVITIES: CPT

## 2022-03-15 PROCEDURE — G0378 HOSPITAL OBSERVATION PER HR: HCPCS

## 2022-03-15 RX ORDER — ALPRAZOLAM 0.5 MG/1
0.5 TABLET ORAL
Qty: 9 TABLET | Refills: 0 | Status: SHIPPED | OUTPATIENT
Start: 2022-03-15 | End: 2022-03-18

## 2022-03-15 RX ORDER — POTASSIUM CHLORIDE 20 MEQ/1
40 TABLET, EXTENDED RELEASE ORAL 2 TIMES DAILY
Status: DISCONTINUED | OUTPATIENT
Start: 2022-03-15 | End: 2022-03-15 | Stop reason: HOSPADM

## 2022-03-15 RX ORDER — FLUTICASONE PROPIONATE 50 MCG
2 SPRAY, SUSPENSION (ML) NASAL AS NEEDED
Status: DISCONTINUED | OUTPATIENT
Start: 2022-03-15 | End: 2022-03-15 | Stop reason: HOSPADM

## 2022-03-15 RX ADMIN — GUAIFENESIN 600 MG: 600 TABLET, EXTENDED RELEASE ORAL at 09:06

## 2022-03-15 RX ADMIN — SERTRALINE 50 MG: 50 TABLET, FILM COATED ORAL at 09:06

## 2022-03-15 RX ADMIN — BUMETANIDE 1 MG: 1 TABLET ORAL at 09:06

## 2022-03-15 RX ADMIN — BUDESONIDE AND FORMOTEROL FUMARATE DIHYDRATE 2 PUFF: 160; 4.5 AEROSOL RESPIRATORY (INHALATION) at 08:00

## 2022-03-15 RX ADMIN — FLUTICASONE PROPIONATE 2 SPRAY: 50 SPRAY, METERED NASAL at 11:37

## 2022-03-15 RX ADMIN — METOPROLOL SUCCINATE 12.5 MG: 25 TABLET, EXTENDED RELEASE ORAL at 09:06

## 2022-03-15 RX ADMIN — POTASSIUM CHLORIDE 40 MEQ: 20 TABLET, EXTENDED RELEASE ORAL at 11:37

## 2022-03-15 RX ADMIN — ACETAMINOPHEN 650 MG: 325 TABLET, FILM COATED ORAL at 09:05

## 2022-03-15 RX ADMIN — SODIUM CHLORIDE, PRESERVATIVE FREE 10 ML: 5 INJECTION INTRAVENOUS at 05:34

## 2022-03-15 RX ADMIN — ENOXAPARIN SODIUM 40 MG: 100 INJECTION SUBCUTANEOUS at 09:11

## 2022-03-15 RX ADMIN — CALCIUM CARBONATE 200 MG: 500 TABLET, CHEWABLE ORAL at 09:05

## 2022-03-15 RX ADMIN — PANTOPRAZOLE SODIUM 40 MG: 40 TABLET, DELAYED RELEASE ORAL at 05:51

## 2022-03-15 RX ADMIN — ASPIRIN 81 MG: 81 TABLET, CHEWABLE ORAL at 09:05

## 2022-03-15 RX ADMIN — LISINOPRIL 2.5 MG: 5 TABLET ORAL at 09:06

## 2022-03-15 RX ADMIN — POLYETHYLENE GLYCOL 3350 17 G: 17 POWDER, FOR SOLUTION ORAL at 09:10

## 2022-03-15 NOTE — PROGRESS NOTES
Care Management Interventions  PCP Verified by CM: Yes  Mode of Transport at Discharge: Self  Transition of Care Consult (CM Consult): SNF  Discharge Durable Medical Equipment: No  Physical Therapy Consult: Yes  Occupational Therapy Consult: Yes  Speech Therapy Consult: No  Support Systems: Spouse/Significant Other  Confirm Follow Up Transport: Family  The Plan for Transition of Care is Related to the Following Treatment Goals : would like to rehab  The Patient and/or Patient Representative was Provided with a Choice of Provider and Agrees with the Discharge Plan?: Yes  Name of the Patient Representative Who was Provided with a Choice of Provider and Agrees with the Discharge Plan: erica Zee of Choice List was Provided with Basic Dialogue that Supports the Patient's Individualized Plan of Care/Goals, Treatment Preferences and Shares the Quality Data Associated with the Providers?: Yes  Discharge Location  Patient Expects to be Discharged to[de-identified] Skilled nursing facility    Patient discharging today to [facility] for short term rehab. Patient's sister and dtr were present at time of transfer. Pt agrees with plans. Family given brochure of facility; another dtr was given one last night. RN given # to call verbal report.

## 2022-03-15 NOTE — PROGRESS NOTES
Problem: Mobility Impaired (Adult and Pediatric)  Goal: *Acute Goals and Plan of Care (Insert Text)  Outcome: Progressing Towards Goal  Note: STG:  (1.)Ms. Keegan Salinas will move from supine to sit and sit to supine  with MINIMAL ASSIST within 3 treatment day(s). (2.)Ms. Keegan Salinas will transfer from bed to chair and chair to bed with STAND BY ASSIST using the least restrictive device within 3 treatment day(s). (3.)Ms. Keegan Salinas will ambulate with STAND BY ASSIST for 25 feet with the least restrictive device within 3 treatment day(s). LTG:  (1.)Ms. Keegan Salinas will move from supine to sit and sit to supine  in bed with STAND BY ASSIST within 7 treatment day(s). (2.)Ms. Keegan Salinas will transfer from bed to chair and chair to bed with SUPERVISION using the least restrictive device within 7 treatment day(s). (3.)Ms. Keegan Salinas will ambulate with SUPERVISION for 50 feet with the least restrictive device within 7 treatment day(s). ________________________________________________________________________________________________       PHYSICAL THERAPY: Daily Note and AM 3/15/2022  OBSERVATION: PT Visit Days : 3  [place] / [place] B / Product Type: Medicare /       NAME/AGE/GENDER: Jacobo Pereira is a 80 y.o. female   PRIMARY DIAGNOSIS: Weakness [R53.1] Esophageal dysphagia Esophageal dysphagia       ICD-10: Treatment Diagnosis:    · Generalized Muscle Weakness (M62.81)  · Difficulty in walking, Not elsewhere classified (R26.2)   Precaution/Allergies:  Benzalkonium chloride, Gold sodium thiosulfate, Nickel, and Benzalkonium      ASSESSMENT:     Ms. Keegan Salinas presents with above diagnosis. Patient demonstrates weakness throughout limiting independence with all transfers and mobility and limiting activity tolerance. She would benefit from therapy to address these deficits. Patient just hospitalized and d/c'd home with [place] on 3/10.   Patient reports therapy did not start before she returned to the hospital.  Daughter reports patient has been struggling to get around at home and interested in SNF for STR. Patient currently admitted under observation status and not eligible for SNF. She certainly could benefit from SNF if admission status does change considering she did not do well at home following last d/c. Patient participated well this am.  All movements continuing to take a good bit of time. Patient required multiple attempts with sit <> stand from bed, toilet, and recliner. She ambulated with her rollator with lots of standing rest breaks and frequent cues as to which foot to advance. Had more difficulty initially advancing the L LE. Seems to have issues with her legs sticking together at the thighs which makes it hard for her to advance. She also reports some episodes of her knees giving out. Patient able to use toilet in the bathroom. Bathed while sitting on the toilet. Patient then able to transfer to the recliner with the rollator and increased time. Patient pleasant and cooperative but struggles with mobility. Seems she was sleeping in a lift chair at home. Patient and family planning on SNF at d/c. This section established at most recent assessment   PROBLEM LIST (Impairments causing functional limitations):  1. Decreased Strength  2. Decreased ADL/Functional Activities  3. Decreased Transfer Abilities  4. Decreased Ambulation Ability/Technique  5. Decreased Balance  6. Decreased Activity Tolerance  7. Increased Shortness of Breath   INTERVENTIONS PLANNED: (Benefits and precautions of physical therapy have been discussed with the patient.)  1. Balance Exercise  2. Bed Mobility  3. Family Education  4. Gait Training  5. Home Exercise Program (HEP)  6. Therapeutic Activites  7. Therapeutic Exercise/Strengthening  8.  Transfer Training     TREATMENT PLAN: Frequency/Duration: daily for duration of hospital stay  Rehabilitation Potential For Stated Goals: Good     REHAB RECOMMENDATIONS (at time of discharge pending progress):    Placement: It is my opinion, based on this patient's performance to date, that Ms. Yeni Chandler may benefit from participating in 1-2 additional therapy sessions in order to continue to assess for rehab potential and then make recommendation for disposition at discharge. HH vs SNF depending on admission status. SNF if available. Equipment:    None at this time              HISTORY:   History of Present Injury/Illness (Reason for Referral): Claudean Hutchinson is a 80 y.o. female with medical history of CHF, HTN, COPD, and recent hospitalization for CHF exacerbation who presented to ED with weakness. Patient was discharged to home on 3/10/22 with home health. Reportedly [facility] RN told her and family that she appeared ill and needed to go back to the hospital.  She reports continued SOB, however she is not hypoxic, even on ambulation in the ER. No worsening edema. Lab work is stable. No fevers. Given weakness and potential need for STR, Hospitalist consulted for admission. Patient mentions a few episodes of vomiting at home, and continued R hip pain after a fall 3 weeks ago, so ER has pending XR and US. Past Medical History/Comorbidities:   Ms. Yeni Chandler  has a past medical history of Acute on chronic diastolic congestive heart failure ([address] (6/29/2021), JOHN (acute kidney injury) ([address] (3/8/2022), COPD (chronic obstructive pulmonary disease) ([address] Diastolic heart failure ([address] DVT (deep vein thrombosis) in pregnancy, Fibromuscular dysplasia of bilateral renal arteries ([address] GERD (gastroesophageal reflux disease), HLD (hyperlipidemia), HTN (hypertension), CAREN on CPAP, Osteoarthritis, Other chronic sinusitis (1/25/2019), and Renal artery stenosis ([address] Ms. Yeni Chandler  has no past surgical history on file.   Social History/Living Environment:   Home Environment: Private residence  Wheelchair Ramp: Yes  One/Two Story Residence: One story  Living Alone: No  Support Systems: Spouse/Significant Other  Patient Expects to be Discharged to[de-identified] Skilled nursing facility  Current DME Used/Available at Home: Luis Antonio Au, rollator,Oxygen, portable  Tub or Shower Type: Shower  Prior Level of Function/Work/Activity:  Ambulating with rollator prior to recent admissions. Number of Personal Factors/Comorbidities that affect the Plan of Care: 1-2: MODERATE COMPLEXITY   EXAMINATION:   Most Recent Physical Functioning:   Gross Assessment:  AROM: Generally decreased, functional  Strength: Generally decreased, functional  Coordination: Generally decreased, functional               Posture:     Balance:  Sitting: Intact  Standing: Impaired  Standing - Static: Constant support  Standing - Dynamic : Constant support Bed Mobility:  Supine to Sit: Minimum assistance; Additional time  Scooting: Minimum assistance; Moderate assistance; Additional time  Wheelchair Mobility:     Transfers:  Sit to Stand: Minimum assistance; Moderate assistance; Additional time  Stand to Sit: Contact guard assistance  Bed to Chair: Minimum assistance; Additional time  Gait:     Base of Support: Center of gravity altered  Speed/Anne Marie: Delayed  Step Length: Left shortened;Right shortened  Gait Abnormalities: Decreased step clearance  Distance (ft): 10 Feet (ft) (x 2)  Assistive Device: Walker, rollator  Ambulation - Level of Assistance: Minimal assistance  Interventions: Safety awareness training;Verbal cues  Home Environment: Private residence  Home Situation  Home Environment: Private residence  Wheelchair Ramp: Yes  One/Two Story Residence: One story  Living Alone: No  Support Systems: Child(len),Spouse/Significant Other  Patient Expects to be Discharged to[de-identified] Unable to determine at this time  Current DME Used/Available at Home: Luis Antonio Au, rollator,Oxygen, portable  Tub or Shower Type: Shower      Body Structures Involved:  1. Muscles Body Functions Affected:  1.  Movement Related Activities and Participation Affected:  1. Mobility   Number of elements that affect the Plan of Care: 3: MODERATE COMPLEXITY   CLINICAL PRESENTATION:   Presentation: Stable and uncomplicated: LOW COMPLEXITY   CLINICAL DECISION MAKING:   [employer] -PAC 6 Clicks   Basic Mobility Inpatient Short Form  How much difficulty does the patient currently have. .. Unable A Lot A Little None   1. Turning over in bed (including adjusting bedclothes, sheets and blankets)? [] 1   [x] 2   [] 3   [] 4   2. Sitting down on and standing up from a chair with arms ( e.g., wheelchair, bedside commode, etc.)   [] 1   [] 2   [x] 3   [] 4   3. Moving from lying on back to sitting on the side of the bed? [] 1   [x] 2   [] 3   [] 4   How much help from another person does the patient currently need. .. Total A Lot A Little None   4. Moving to and from a bed to a chair (including a wheelchair)? [] 1   [] 2   [x] 3   [] 4   5. Need to walk in hospital room? [] 1   [] 2   [x] 3   [] 4   6. Climbing 3-5 steps with a railing? [] 1   [x] 2   [] 3   [] 4   © 2007, Trustees of [employer], under license to [employer]. All rights reserved      Score:  Initial: 15 Most Recent: X (Date: -- )    Interpretation of Tool:  Represents activities that are increasingly more difficult (i.e. Bed mobility, Transfers, Gait). Medical Necessity:     · Patient is expected to demonstrate progress in   · strength, balance, and coordination  ·  to   · increase independence with mobility. · .  Reason for Services/Other Comments:  · Patient continues to require skilled intervention due to   · Decreased strength, mobility, and tolerance for activity. · .   Use of outcome tool(s) and clinical judgement create a POC that gives a: Clear prediction of patient's progress: LOW COMPLEXITY            TREATMENT:   (In addition to Assessment/Re-Assessment sessions the following treatments were rendered)   Pre-treatment Symptoms/Complaints:  patient agreeable.   Needing to toilet. Pain: Initial:   Pain Intensity 1: 3  Post Session:  3     Therapeutic Activity: (    ):  Therapeutic activities including Bed transfers, Chair transfers, Toilet transfers, Ambulation on level ground, and bathing while seated on toilet to improve mobility, strength, balance, and coordination. Required minimal Safety awareness training;Verbal cues to promote static and dynamic balance in standing. Date:  3/14 Date:   Date:     Activity/Exercise Parameters Parameters Parameters   Ankle pumps 10     LAQ 10     Marching in place 10     Hip ab/ad 10                               Braces/Orthotics/Lines/Etc:   · O2  Treatment/Session Assessment:    · Response to Treatment:  participated well but requires a good bit of time for all mobility/transitions. · Interdisciplinary Collaboration:   o Physical Therapist  o Registered Nurse  · After treatment position/precautions:   o Up in chair  o Bed/Chair-wheels locked  o Call light within reach  o Family at bedside   · Compliance with Program/Exercises: Will assess as treatment progresses  · Recommendations/Intent for next treatment session: \"Next visit will focus on advancements to more challenging activities and reduction in assistance provided\".   Total Treatment Duration:  PT Patient Time In/Time Out  Time In: 0930  Time Out: [address] [facility] MOOK[facility] [place]

## 2022-03-15 NOTE — PROGRESS NOTES
Daughter had concern patient may have UTI. Collected clean catch urine and sent to lab. Messaged Dr. Leslie Whitehead regarding urine assessment.

## 2022-03-15 NOTE — PROGRESS NOTES
[facility] Progress Note         Admit Date:  3/12/2022    Today's Date:  3/15/2022    CC:  GERD, dysphagia     Subjective:     Patient and RN reports she has tolerated clear liquids and feels better. Denies abdominal pain, nausea, vomiting, or dysphagia. Daughter is at bedside. Medications:   Current Facility-Administered Medications   Medication Dose Route Frequency    lisinopriL (PRINIVIL, ZESTRIL) tablet 2.5 mg  2.5 mg Oral DAILY    metoprolol succinate (TOPROL-XL) XL tablet 12.5 mg  12.5 mg Oral DAILY    sodium chloride (NS) flush 5-10 mL  5-10 mL IntraVENous Q8H    sodium chloride (NS) flush 5-10 mL  5-10 mL IntraVENous PRN    [Held by provider] amLODIPine (NORVASC) tablet 10 mg  10 mg Oral DAILY    aspirin chewable tablet 81 mg  81 mg Oral DAILY    budesonide-formoteroL (SYMBICORT) 160-4.5 mcg/actuation HFA inhaler 2 Puff  2 Puff Inhalation BID RT    bumetanide (BUMEX) tablet 1 mg  1 mg Oral DAILY    calcium carbonate (TUMS) chewable tablet 200 mg [elemental]  200 mg Oral DAILY    enoxaparin (LOVENOX) injection 40 mg  40 mg SubCUTAneous Q12H    guaiFENesin ER (MUCINEX) tablet 600 mg  600 mg Oral Q12H    pantoprazole (PROTONIX) tablet 40 mg  40 mg Oral ACB    polyethylene glycol (MIRALAX) packet 17 g  17 g Oral DAILY    pravastatin (PRAVACHOL) tablet 40 mg  40 mg Oral QHS    sertraline (ZOLOFT) tablet 50 mg  50 mg Oral DAILY    sodium chloride (NS) flush 5-10 mL  5-10 mL IntraVENous Q8H    acetaminophen (TYLENOL) tablet 650 mg  650 mg Oral Q6H PRN    Or    acetaminophen (TYLENOL) suppository 650 mg  650 mg Rectal Q6H PRN    ALPRAZolam (XANAX) tablet 0.5 mg  0.5 mg Oral TID PRN    promethazine (PHENERGAN) tablet 12.5 mg  12.5 mg Oral Q6H PRN    Or    ondansetron (ZOFRAN) injection 4 mg  4 mg IntraVENous Q6H PRN    sodium chloride (NS) flush 5-10 mL  5-10 mL IntraVENous PRN       Review of Systems:  ROS was obtained, with pertinent positives as listed above.   No chest pain or SOB. Diet:  Clear liquid     Objective:   Vitals:  Visit Vitals  /71 (BP 1 Location: Left lower arm)   Pulse 90   Temp 100.3 °F (37.9 °C)   Resp 18   Ht 5' 5\" (1.651 m)   Wt 111.6 kg (246 lb)   SpO2 93%   BMI 40.94 kg/m²     Intake/Output:  No intake/output data recorded. No intake/output data recorded. Exam:  General appearance: alert, cooperative, no distress. On 3L O2 via nc  Lungs: coarse to auscultation bilaterally anteriorly  Heart: regular rate and rhythm   Abdomen: soft, non-tender. Bowel sounds normal. No masses, no organomegaly  Extremities: extremities normal, atraumatic, no cyanosis or edema  Neuro:  alert and oriented    Data Review (Labs):    Recent Labs     03/15/22  0404 03/14/22  0415 03/13/22  0358 03/12/22  1847   WBC 9.4 8.3 7.9 9.4   HGB 9.8* 10.2* 10.3* 10.7*   HCT 32.0* 33.6* 33.9* 34.9*    150 292 292   MCV 78.4* 79.4* 78.7* 78.3*   * 132* 134* 134*   K 3.3* 4.0 3.0* 3.1*   CL 98 99 95* 93*   CO2 29 28 33* 33*   BUN 24* 24* 30* 30*   CREA 0.70 0.76 0.97 0.90   CA 9.1 9.1 9.2 8.1*   MG  --   --  0.8* 1.5*   * 133* 139* 76   AP  --  77  --  87   AST  --  24  --  25   ALT  --  19  --  24   TBILI  --  0.4  --  0.5   CBIL  --  0.2  --   --    ALB  --  2.7*  --  3.1*   TP  --  6.6  --  7.5   LPSE  --   --   --  62*      Ref. Range 3/12/2022 18:47 3/14/2022 04:15   NT pro-BNP Latest Ref Range: <450 PG/ (H)     Hemoglobin A1c, (calculated) Latest Ref Range: 4.20 - 6.30 %   6.7 (H)   Est. average glucose Latest Units: mg/dL   146   Iron Latest Ref Range: 35 - 150 ug/dL   39   TIBC Latest Ref Range: 250 - 450 ug/dL   535 (H)   Transferrin Saturation Latest Units: %   7   Ferritin Latest Ref Range: 8 - 388 NG/ML   28      RUQ US 3/12/22  FINDINGS:   - Liver: The liver is normal in size at 15.7 cm. There are 3 cysts in the left  lobe, the largest measures 6.5 x 4.2 x 7.0 cm and contains an internal  septation.  The other 2 simple cysts measure 1.2 x 0.9 x 0.9 cm and 4.8 x 3.5 x  4.5 cm. No solid liver mass or intrahepatic biliary dilatation is seen. - Gallbladder: There is mild nonspecific gallbladder wall thickening, measuring  3.2 mm. No gallstones are identified. There is no pericholecystic fluid. - Bile ducts: Within normal limits.  The common bile duct measures 5.1 mm.  - Pancreas: Within normal limits. - Right kidney: Within normal limits. - Aorta and IVC: Within normal limits. - Portal vein: Within normal limits.  - Other: No ascites.   IMPRESSION  1. Three liver cysts, the largest containing a thin internal septation. 2. Mild nonspecific gallbladder wall thickening. Assessment:     Principal Problem:    Esophageal dysphagia (3/14/2022)    Active Problems:    Electrolyte or fluid disorder (3/14/2022)      Mood disorder ([address] (3/14/2022)      Do not resuscitate discussion (3/14/2022)      Class 3 severe obesity due to excess calories with serious comorbidity and body mass index (BMI) of 40.0 to 44.9 in adult [facility]) (3/10/2022)      Primary hypertension (3/8/2022)      Lymphedema of both lower extremities (5/2/2016)      CAREN (obstructive sleep apnea) (1/4/2016)      Overview: May 22, 2007 but night study demonstrated mild CAREN with AHI 10/h, PLM       index 10/h, titrated CPAP 8 cm H2O with residual RDI 6.6/h. Mild persistent asthma without complication (5/05/3634)      Overview: Spirometry without obstruction, normal FVC and FEV1. DLCO is normal, not       corrected for hgb.       Gastroesophageal reflux disease without esophagitis (1/16/2020)      Overview: currently on aciphex daily      Chronic diastolic heart failure ([address] (1/4/2016)      Debilitated patient (3/12/2022)      Fall (3/13/2022)      Hypokalemia (3/14/2022)      Hypomagnesemia (3/14/2022)      Weight loss, unintentional (3/14/2022)      Moderate protein-calorie malnutrition ([address] (3/14/2022)      Delirium (3/14/2022)      Do not resuscitate (3/14/2022)      81 yo female, follows with Dr. Betty Burleson with Karley Carrier PMH including but not limited to CHF with preserved EF, HTN, sleep apnea, who was seen in consultation 13 March 2022 at the request of Dr. Vargas for GERD and dysphagia, who was admitted for worsening PALACIOS and now on CPAP, following recent admission for CHF exacerbation and discharged on 3L O2 and diuretics (Cr normal but K low). Speech has seen and reports no s/sx aspiration on Bedside swallow. She has been having reflux, solid food dysphagia, and poor po intake x one month.  Mild microcytic anemia and elevated BUN/Cr ratio noted.  RUQ US obtained in ED due to TTP RUQ on exam with nonspecific GB wall thickening and liver cysts, normal LFTs/lipase on labs. Iron studies are trending toward iron def. She had MRI hip on 3/14/22 for evaluation s/p recent fall. Patient and daughter strongly prefer to avoid sedation. Symptoms have started to improve, treating medically. Tolerating clear liquids without dysphagia, nausea, or vomiting. Plan:     - Supportive care, IVF, maintain electrolytes. - Monitor hgb and transfuse PRN if hgb <7.  - Continue Protonix 40mg daily.  - Hold lovenox if hgb drops or overt bleed noted. - Advance diet to GI soft. - Consider barium esophagram with tablet if symptoms do not continue to improve. Patient is seen and examined in collaboration with Dr. Shweta Snow. Assessment and plan as per Dr. Shweta Snow. Addendum:  She reports D/C today to [facility]. She has no GI symptoms today. Her diet is being advanced. I am comfortable with discharge. I recommend a GI soft diet, advanced to regular as she tolerates. I have offered follow up, but they are not sure it is needed. We will be happy to see her if desired, and I have provided my number. She may also return to the care of Dr. Betty Burleson if desired.   She should remain on a full-strength, once daily PPI such as Protonix 40 mg before breakfast.

## 2022-03-15 NOTE — PROGRESS NOTES
TRANSFER - OUT REPORT:    Verbal report given to [facility] (name) on Brooke Swann  being transferred to [address] [place] [address] for routine post - op       Report consisted of patients Situation, Background, Assessment and   Recommendations(SBAR). Information from the following report(s) SBAR was reviewed with the receiving nurse. Lines:       Opportunity for questions and clarification was provided.       Patient transported with:

## 2022-03-15 NOTE — PROGRESS NOTES
Physician Progress Note      PATIENT:               Beth Marcus  CSN #:                  [id number]  :                       1940  ADMIT DATE:       3/8/2022 6:26 PM  [address] [place] DATE:        3/10/2022 5:00 PM  RESPONDING  PROVIDER #:        Darling Ryder MD          QUERY TEXT:    Patient admitted with CHF. Noted documentation of Acute Kidney Injury in. In order to support the diagnosis of JOHN, please include additional clinical indicators in your documentation. Or please document if the diagnosis of JOHN has been ruled out after further study. The medical record reflects the following:  Risk Factors: diuretics for her heart disease  Clinical Indicators: patient was noted to have JOHN with a creatinine that improved from /1.17-0.85  Treatment: IV Lasix, daily labs    Defined by Kidney Disease Improving Global Outcomes (KDIGO) clinical practice guideline for acute kidney injury:  -Increase in SCr by greater than or equal to 0.3 mg/dl within 48 hours; or  -Increase or decrease in SCr to greater than or equal to 1.5 times baseline, which is known or presumed to have occurred within the prior 7 days; or  -Urine volume < 0.5ml/kg/h for 6 hours  Options provided:  -- Acute kidney injury evidenced by, Please document evidence as well as baseline creatinine, if known. -- Currently resolved acute kidney injury was evidenced by, Please document evidence as well as baseline creatinine, if known. -- Acute kidney injury ruled out after study  -- Other - I will add my own diagnosis  -- Disagree - Not applicable / Not valid  -- Disagree - Clinically unable to determine / Unknown  -- Refer to Clinical Documentation Reviewer    PROVIDER RESPONSE TEXT:    Provider disagreed with this query.     Query created by: Joey Palacios on 3/15/2022 1:44 PM      Electronically signed by:  Darling Ryder MD 3/15/2022 2:35 PM

## 2022-03-15 NOTE — DISCHARGE SUMMARY
Hospitalist Discharge Summary   Admit Date:  3/12/2022  6:26 PM   DC Note date: 3/15/2022  Name:  Chin Jimenez   Age:  80 y.o. Sex:  female  :  1940   MRN:  [id number]   Room:  [facility]  PCP:  Felice Blanco MD    Presenting Complaint: Shortness of Breath    Initial Admission Diagnosis: Weakness [R53.1]  Hyponatremia [E87.1]     Problem List for this Hospitalization:  Hospital Problems as of 3/15/2022 Date Reviewed: 3/10/2022          Codes Class Noted - Resolved POA    * (Principal) Esophageal dysphagia ICD-10-CM: R13.19  ICD-9-CM: 787.29  3/14/2022 - Present Yes        RESOLVED: Fall ICD-10-CM: Romelia Davila. Mary Galeano  ICD-9-CM: E888.9  3/13/2022 - 3/15/2022 Yes        Weight loss, unintentional ICD-10-CM: R63.4  ICD-9-CM: 783.21  3/14/2022 - Present Yes        Moderate protein-calorie malnutrition (HCC) (Chronic) ICD-10-CM: E44.0  ICD-9-CM: 263.0  3/14/2022 - Present Yes        Debilitated patient ICD-10-CM: R53.81  ICD-9-CM: 799.3  3/12/2022 - Present Yes        Electrolyte or fluid disorder ICD-10-CM: E87.8  ICD-9-CM: 276.9  3/14/2022 - Present Yes        Mood disorder ([facility] [address] ICD-10-CM: F39  ICD-9-CM: 296.90  3/14/2022 - Present Yes        Delirium ICD-10-CM: R41.0  ICD-9-CM: 780.09  3/14/2022 - Present No        RESOLVED: Do not resuscitate discussion ICD-10-CM: Z71.89  ICD-9-CM: V65.49  3/14/2022 - 3/15/2022 No        Class 3 severe obesity due to excess calories with serious comorbidity and body mass index (BMI) of 40.0 to 44.9 in adult [facility]) (Chronic) ICD-10-CM: E66.01, Z68.41  ICD-9-CM: 278.01, V85.41  3/10/2022 - Present Yes        Hyponatremia ICD-10-CM: E87.1  ICD-9-CM: 276.1  3/15/2022 - Present No        Do not resuscitate (Chronic) ICD-10-CM: Z66  ICD-9-CM: V49.86  3/14/2022 - Present Yes        Primary hypertension (Chronic) ICD-10-CM: I10  ICD-9-CM: 401.9  3/8/2022 - Present Yes        Gastroesophageal reflux disease without esophagitis ICD-10-CM: K21.9  ICD-9-CM: 530.81  2020 - Present Yes Overview Addendum 3/10/2022 10:49 AM by Andra Guzmán MD     currently on aciphex daily             Mild persistent asthma without complication (Chronic) SIS-11-NW: J45.30  ICD-9-CM: 493.90  3/14/2018 - Present Yes    Overview Addendum 3/10/2022 10:49 AM by Andra Guzmán MD     Spirometry without obstruction, normal FVC and FEV1. DLCO is normal, not corrected for hgb. Lymphedema of both lower extremities (Chronic) ICD-10-CM: I89.0  ICD-9-CM: 457.1  5/2/2016 - Present Yes        CAREN (obstructive sleep apnea) ICD-10-CM: G47.33  ICD-9-CM: 327.23  1/4/2016 - Present Yes    Overview Addendum 3/10/2022 10:50 AM by Andra Guzmán MD     May 22, 2007 but night study demonstrated mild CAREN with AHI 10/h, PLM index 10/h, titrated CPAP 8 cm H2O with residual RDI 6.6/h. Chronic diastolic heart failure (HCC) (Chronic) ICD-10-CM: I50.32  ICD-9-CM: 428.32  1/4/2016 - Present Yes        RESOLVED: Hypokalemia ICD-10-CM: E87.6  ICD-9-CM: 276.8  3/14/2022 - 3/15/2022 Yes        RESOLVED: Hypomagnesemia ICD-10-CM: P20.29  ICD-9-CM: 275.2  3/14/2022 - 3/15/2022 Yes            Did Patient have Sepsis (YES OR NO): No    Hospital Course:  82F PMHx CHF, HTN, COPD, and recent hospitalization for CHF exacerbation who presented to ED with weakness.  Patient was discharged to home on 3/10/22 with home health.  [facility] told her and family that she appeared ill and needed to go back to the hospital. Marylen Harden reports continued SOB, however she is not hypoxic, even on ambulation in the ER.  No worsening edema.  Lab work is stable.  No fevers. Given weakness and potential need for STR, Hospitalist consulted for admission.  Patient mentions a few episodes of vomiting at home, and continued R hip pain after a fall 3 weeks ago. X-ray of hip was negative for fracture. Ultrasound of the liver had 3 cysts with nonspecific gallbladder wall thickening.   MRI hip with right biceps for Travis tendon tear at the ischial tuberosity. She was evaluated by gastroenterology. She was evaluated by physical therapy, Occupational Therapy, nutrition, speech language therapy. She was determined to be safe for discharge to rehab on 3/15. She should follow-up with her primary care provider within 7 days. Primary care provider may consider the following:  -Referral to orthopedic surgery for tendon tear  -Plan follow-up with gastroenterology, cardiology  -Medication changes as noted below  -Narcotic discontinued in setting of benzodiazepine use and fall  -Follow-up nephrotic labs pending at discharge (Pr/Cr, free light chains, electrophoresis)    Disposition: Skilled Nursing Facility  Diet: ADULT ORAL NUTRITION SUPPLEMENT Breakfast, Lunch, Dinner; Clear Liquid  ADULT DIET Dysphagia - Pureed; GI [place] (GERD/Peptic Ulcer)  Code Status: DNR    Follow Up Orders: Follow-up Appointments   Procedures    FOLLOW UP VISIT Appointment in: One Week Follow-up with primary care provider within 1 week. Follow-up with gastroenterology within 2 weeks. Follow-up with primary care provider within 1 week. Follow-up with gastroenterology within 2 weeks. Standing Status:   Standing     Number of Occurrences:   1     Order Specific Question:   Appointment in     Answer: One Week       Follow-up Information     Follow up With Specialties Details Why Contact Info    Dorothy Peralta MD Internal Medicine   [address] [facility] [address] [facility]  [phone]      [address] [place] [address] [facility]    [address] [place] [address],  [address] [place] [address]  [phone]        Time spent in patient discharge and coordination 51 minutes. Plan was discussed with patient, daughter, nurse, , lab. All questions answered. Patient was stable at time of discharge.   Instructions given to call a physician or return if any concerns. Discharge Info:   Current Discharge Medication List      CONTINUE these medications which have NOT CHANGED    Details   bumetanide (BUMEX) 1 mg tablet Take 1 Tablet by mouth daily. Qty: 30 Tablet, Refills: 0      lisinopriL (PRINIVIL, ZESTRIL) 2.5 mg tablet Take 1 Tablet by mouth daily. Qty: 30 Tablet, Refills: 0      metoprolol succinate (TOPROL-XL) 25 mg XL tablet Take 0.5 Tablets by mouth daily. Qty: 15 Tablet, Refills: 0      potassium 99 mg tablet Take 99 mg by mouth daily. RABEprazole (ACIPHEX) 20 mg TbEC Take 20 mg by mouth daily. pravastatin (PRAVACHOL) 40 mg tablet Take 40 mg by mouth nightly. calcium carbonate (TUMS) 200 mg calcium (500 mg) chew Take 1 Tablet by mouth daily. calcium-cholecalciferol, d3, (CALCIUM 600 + D) 600-125 mg-unit tab Take  by mouth. clotrimazole-betamethasone (LOTRISONE) topical cream Apply  to affected area as needed for Skin Irritation. triamcinolone acetonide (KENALOG) 0.1 % ointment Apply  to affected area as needed for Skin Irritation. use thin layer      aspirin 81 mg chewable tablet Take 81 mg by mouth daily. metaxalone (Skelaxin) 800 mg tablet Take  by mouth two (2) times a day. celecoxib (CeleBREX) 200 mg capsule Take  by mouth two (2) times a day. guaiFENesin ER (Mucinex) 600 mg ER tablet Take 600 mg by mouth two (2) times a day. fluticasone propionate (FLONASE) 50 mcg/actuation nasal spray 2 Sprays by Both Nostrils route as needed. fluticasone propion-salmeteroL (Advair HFA) 115-21 mcg/actuation inhaler Take 2 Puffs by inhalation two (2) times a day. ALPRAZolam (XANAX) 0.5 mg tablet Take  by mouth as needed for Anxiety. sertraline (ZOLOFT) 50 mg tablet Take  by mouth as needed. white pet-mineral oil-lanolin (AKWA TEARS) ophthalmic ointment Apply  to eye as needed.       polyethylene glycol (Miralax) 17 gram packet Take 17 g by mouth as needed for Constipation. STOP taking these medications       guaiFENesin-dextromethorphan (ROBITUSSIN DM) 100-10 mg/5 mL syrup Comments:   Reason for Stopping:         HYDROcodone-acetaminophen (NORCO)  mg tablet Comments:   Reason for Stopping:               Procedures done this admission:  * No surgery found *    Consults this admission:  IP CONSULT TO GASTROENTEROLOGY    Echocardiogram/EKG results:  Results from Hospital Encounter encounter on 03/08/22    ECHO ADULT COMPLETE    Interpretation Summary    Left Ventricle: Left ventricle size is normal. Normal wall thickness. Normal wall motion. Normal left ventricular systolic function with a visually estimated EF of 50 - 65%. Abnormal diastolic function.   Left Atrium: Left atrium is mildly dilated. EKG Results     Procedure 720 Value Units Date/Time    EKG [[id number]] Collected: 03/12/22 1828    Order Status: Completed Updated: 03/13/22 0844     Ventricular Rate 75 BPM      Atrial Rate 75 BPM      P-R Interval 198 ms      QRS Duration 100 ms      Q-T Interval 408 ms      QTC Calculation (Bezet) 455 ms      Calculated P Axis 71 degrees      Calculated R Axis 17 degrees      Calculated T Axis 55 degrees      Diagnosis --     Normal sinus rhythm  ST abnormality, possible digitalis effect  Abnormal ECG  When compared with ECG of 12-MAR-2022 16:29,  Borderline criteria for Lateral infarct are no longer Present  Nonspecific T wave abnormality has replaced inverted T waves in Anterior   leads  Confirmed by Pete Lanier ([id number]) on 3/13/2022 8:44:23 AM            Diagnostic Imaging/Tests:   XR HIP RT W OR WO PELV 2-3 VWS    Result Date: 3/12/2022  EXAM: Pelvis and right hip x-rays. INDICATION: Pain, fall injury several weeks ago. COMPARISON: None. TECHNIQUE: A frontal view of the pelvis was supplemented with 2 dedicated views of the right hip joint. FINDINGS: No acute fracture or dislocation is seen. The pelvic ring is intact.  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 If there is continued concern for an occult hip fracture, MRI is a more sensitive study. No acute process. US ABD LTD    Result Date: 3/13/2022  EXAM: Limited abdomen ultrasound. INDICATION: Pain. COMPARISON: None. TECHNIQUE: Standard protocol limited right upper quadrant abdomen ultrasound. FINDINGS: - Liver: The liver is normal in size at 15.7 cm. There are 3 cysts in the left lobe, the largest measures 6.5 x 4.2 x 7.0 cm and contains an internal septation. The other 2 simple cysts measure 1.2 x 0.9 x 0.9 cm and 4.8 x 3.5 x 4.5 cm. No solid liver mass or intrahepatic biliary dilatation is seen. - Gallbladder: There is mild nonspecific gallbladder wall thickening, measuring 3.2 mm. No gallstones are identified. There is no pericholecystic fluid. - Bile ducts: Within normal limits. The common bile duct measures 5.1 mm. - Pancreas: Within normal limits. - Right kidney: Within normal limits. - Aorta and IVC: Within normal limits. - Portal vein: Within normal limits. - Other: No ascites. 1. Three liver cysts, the largest containing a thin internal septation. 2. Mild nonspecific gallbladder wall thickening. XR CHEST PORT    Result Date: 3/12/2022  EXAMINATION: XR CHEST PORT 3/12/2022 7:03 PM ACCESSION NUMBER: [id number] COMPARISON: 3/8/2022 INDICATION: Shortness of Breath TECHNIQUE: A single view of the chest was obtained. FINDINGS: Support Devices: *  None Cardiac Silhouette: The axilla is enlarged, unchanged. Mediastinum: Aortic arch calcifications. Lungs: No airspace consolidation. No pneumothorax or sizable pleural effusion. Upper Abdomen: Normal Miscellaneous: No fracture or suspicious osseous lesion. Stable cardiomegaly without focal airspace consolidation.        All Micro Results     Procedure Component Value Units Date/Time    CULTURE, BLOOD [[id number]] Collected: 03/15/22 1202    Order Status: Completed Specimen: Blood Updated: 03/15/22 1212 CULTURE, BLOOD [[id number]] Collected: 03/15/22 1204    Order Status: Completed Specimen: Blood Updated: 03/15/22 1212    COVID-19 RAPID TEST [[id number]]     Order Status: Sent     CULTURE, URINE [[id number]] Collected: 03/15/22 0519    Order Status: Completed Specimen: Urine from Clean catch Updated: 03/15/22 0931          Labs: Results:       BMP, Mg, Phos Recent Labs     03/15/22  0404 03/14/22  0415 03/13/22  0358 03/12/22  1847 03/12/22  1847   * 132* 134*   < > 134*   K 3.3* 4.0 3.0*   < > 3.1*   CL 98 99 95*   < > 93*   CO2 29 28 33*   < > 33*   AGAP 7 5* 6*   < > 8   BUN 24* 24* 30*   < > 30*   CREA 0.70 0.76 0.97   < > 0.90   CA 9.1 9.1 9.2   < > 8.1*   * 133* 139*   < > 76   MG 1.9  --  0.8*  --  1.5*    < > = values in this interval not displayed.       CBC Recent Labs     03/15/22  0404 03/14/22  0415 03/13/22  0358   WBC 9.4 8.3 7.9   RBC 4.08 4.23 4.31   HGB 9.8* 10.2* 10.3*   HCT 32.0* 33.6* 33.9*    150 292   GRANS 66 63 53   LYMPH 19 21 27   EOS 2 3 8*   MONOS 12 13* 10   BASOS 1 1 1   IG 0 0 0   ANEU 6.2 5.2 4.2   ABL 1.8 1.7 2.2   YUDI 0.2 0.3 0.6   ABM 1.1 1.1 0.8   ABB 0.1 0.1 0.1   AIG 0.0 0.0 0.0      LFT Recent Labs     03/14/22 0415 03/12/22 1847   ALT 19 24   AP 77 87   TP 6.6 7.5   ALB 2.7* 3.1*   GLOB 3.9* 4.4*   AGRAT 0.7* 0.7*      Cardiac Testing No results found for: BNPP, BNP, CPK, RCK1, RCK2, RCK3, RCK4, CKMB, CKNDX, CKND1, TROPT, TROIQ   Coagulation Tests No results found for: PTP, INR, APTT, INREXT, INREXT   A1c Lab Results   Component Value Date/Time    Hemoglobin A1c 6.7 (H) 03/14/2022 04:15 AM      Lipid Panel Lab Results   Component Value Date/Time    Cholesterol, total 160 03/14/2022 04:15 AM    HDL Cholesterol 50 03/14/2022 04:15 AM    LDL, calculated 91.4 03/14/2022 04:15 AM    VLDL, calculated 18.6 03/14/2022 04:15 AM    Triglyceride 93 03/14/2022 04:15 AM    CHOL/HDL Ratio 3.2 03/14/2022 04:15 AM      Thyroid Panel Lab Results   Component Value Date/Time TSH 0.961 03/14/2022 04:15 AM        Most Recent UA Lab Results   Component Value Date/Time    Color YELLOW 03/15/2022 05:19 AM    Appearance TURBID 03/15/2022 05:19 AM    Specific gravity 1.019 03/15/2022 05:19 AM    pH (UA) 5.0 03/15/2022 05:19 AM    Protein 30 (A) 03/15/2022 05:19 AM    Glucose Negative 03/15/2022 05:19 AM    Ketone Negative 03/15/2022 05:19 AM    Bilirubin Negative 03/15/2022 05:19 AM    Blood Negative 03/15/2022 05:19 AM    Urobilinogen 0.2 03/15/2022 05:19 AM    Nitrites Negative 03/15/2022 05:19 AM    Leukocyte Esterase Negative 03/15/2022 05:19 AM    WBC 0-3 03/15/2022 05:19 AM    Bacteria 0 03/15/2022 05:19 AM          All Labs from Last 24 Hrs:  Recent Results (from the past 24 hour(s))   PLEASE READ & DOCUMENT PPD TEST IN 48 HRS    Collection Time: 03/15/22 12:10 AM   Result Value Ref Range    PPD      mm Induration 0 0 - 5 mm   CBC WITH AUTOMATED DIFF    Collection Time: 03/15/22  4:04 AM   Result Value Ref Range    WBC 9.4 4.3 - 11.1 K/uL    RBC 4.08 4.05 - 5.2 M/uL    HGB 9.8 (L) 11.7 - 15.4 g/dL    HCT 32.0 (L) 35.8 - 46.3 %    MCV 78.4 (L) 79.6 - 97.8 FL    MCH 24.0 (L) 26.1 - 32.9 PG    MCHC 30.6 (L) 31.4 - 35.0 g/dL    RDW 16.9 (H) 11.9 - 14.6 %    PLATELET 830 855 - 015 K/uL    MPV 11.3 9.4 - 12.3 FL    ABSOLUTE NRBC 0.00 0.0 - 0.2 K/uL    DF AUTOMATED      NEUTROPHILS 66 43 - 78 %    LYMPHOCYTES 19 13 - 44 %    MONOCYTES 12 4.0 - 12.0 %    EOSINOPHILS 2 0.5 - 7.8 %    BASOPHILS 1 0.0 - 2.0 %    IMMATURE GRANULOCYTES 0 0.0 - 5.0 %    ABS. NEUTROPHILS 6.2 1.7 - 8.2 K/UL    ABS. LYMPHOCYTES 1.8 0.5 - 4.6 K/UL    ABS. MONOCYTES 1.1 0.1 - 1.3 K/UL    ABS. EOSINOPHILS 0.2 0.0 - 0.8 K/UL    ABS. BASOPHILS 0.1 0.0 - 0.2 K/UL    ABS. IMM.  GRANS. 0.0 0.0 - 0.5 K/UL   METABOLIC PANEL, BASIC    Collection Time: 03/15/22  4:04 AM   Result Value Ref Range    Sodium 134 (L) 136 - 145 mmol/L    Potassium 3.3 (L) 3.5 - 5.1 mmol/L    Chloride 98 98 - 107 mmol/L    CO2 29 21 - 32 mmol/L    Anion gap 7 7 - 16 mmol/L    Glucose 119 (H) 65 - 100 mg/dL    BUN 24 (H) 8 - 23 MG/DL    Creatinine 0.70 0.6 - 1.0 MG/DL    GFR est AA >60 >60 ml/min/1.73m2    GFR est non-AA >60 >60 ml/min/1.73m2    Calcium 9.1 8.3 - 10.4 MG/DL   MAGNESIUM    Collection Time: 03/15/22  4:04 AM   Result Value Ref Range    Magnesium 1.9 1.8 - 2.4 mg/dL   URINALYSIS W/ RFLX MICROSCOPIC    Collection Time: 03/15/22  5:19 AM   Result Value Ref Range    Color YELLOW      Appearance TURBID      Specific gravity 1.019 1.001 - 1.023      pH (UA) 5.0 5.0 - 9.0      Protein 30 (A) NEG mg/dL    Glucose Negative mg/dL    Ketone Negative NEG mg/dL    Bilirubin Negative NEG      Blood Negative NEG      Urobilinogen 0.2 0.2 - 1.0 EU/dL    Nitrites Negative NEG      Leukocyte Esterase Negative NEG      WBC 0-3 0 /hpf    Bacteria 0 0 /hpf       Current Med List in Hospital:   Current Facility-Administered Medications   Medication Dose Route Frequency    potassium chloride (K-DUR, KLOR-CON M20) SR tablet 40 mEq  40 mEq Oral BID    fluticasone propionate (FLONASE) 50 mcg/actuation nasal spray 2 Spray  2 Spray Both Nostrils PRN    lisinopriL (PRINIVIL, ZESTRIL) tablet 2.5 mg  2.5 mg Oral DAILY    metoprolol succinate (TOPROL-XL) XL tablet 12.5 mg  12.5 mg Oral DAILY    sodium chloride (NS) flush 5-10 mL  5-10 mL IntraVENous Q8H    sodium chloride (NS) flush 5-10 mL  5-10 mL IntraVENous PRN    [Held by provider] amLODIPine (NORVASC) tablet 10 mg  10 mg Oral DAILY    aspirin chewable tablet 81 mg  81 mg Oral DAILY    budesonide-formoteroL (SYMBICORT) 160-4.5 mcg/actuation HFA inhaler 2 Puff  2 Puff Inhalation BID RT    bumetanide (BUMEX) tablet 1 mg  1 mg Oral DAILY    calcium carbonate (TUMS) chewable tablet 200 mg [elemental]  200 mg Oral DAILY    enoxaparin (LOVENOX) injection 40 mg  40 mg SubCUTAneous Q12H    guaiFENesin ER (MUCINEX) tablet 600 mg  600 mg Oral Q12H    pantoprazole (PROTONIX) tablet 40 mg  40 mg Oral ACB    polyethylene glycol (MIRALAX) packet 17 g  17 g Oral DAILY    pravastatin (PRAVACHOL) tablet 40 mg  40 mg Oral QHS    sertraline (ZOLOFT) tablet 50 mg  50 mg Oral DAILY    sodium chloride (NS) flush 5-10 mL  5-10 mL IntraVENous Q8H    acetaminophen (TYLENOL) tablet 650 mg  650 mg Oral Q6H PRN    Or    acetaminophen (TYLENOL) suppository 650 mg  650 mg Rectal Q6H PRN    ALPRAZolam (XANAX) tablet 0.5 mg  0.5 mg Oral TID PRN    promethazine (PHENERGAN) tablet 12.5 mg  12.5 mg Oral Q6H PRN    Or    ondansetron (ZOFRAN) injection 4 mg  4 mg IntraVENous Q6H PRN    sodium chloride (NS) flush 5-10 mL  5-10 mL IntraVENous PRN       Allergies   Allergen Reactions    Benzalkonium Chloride Other (comments)     Benzalkonium  Conjunctivitis sx    Gold Sodium Thiosulfate Itching    Nickel Itching    Benzalkonium Other (comments)     Caused pink eye     Immunization History   Administered Date(s) Administered    TB Skin Test (PPD) Intradermal 03/12/2022       Recent Vital Data:  Patient Vitals for the past 24 hrs:   Temp Pulse Resp BP SpO2   03/15/22 1053 98.9 °F (37.2 °C) 90 18 120/62 95 %   03/15/22 0800     93 %   03/15/22 0736 100.3 °F (37.9 °C) 90 18 127/71 93 %   03/15/22 0314 100.4 °F (38 °C) 91 18 128/69 91 %   03/14/22 2318 (!) 100.8 °F (38.2 °C) 84 18 127/60 90 %   03/14/22 2154     92 %   03/14/22 1919 (!) 101.6 °F (38.7 °C) 85 18 (!) 144/67 93 %   03/14/22 1526 99 °F (37.2 °C) 91 18 (!) 141/84 95 %     Oxygen Therapy  O2 Sat (%): 95 % (03/15/22 1053)  Pulse via Oximetry: 102 beats per minute (03/15/22 0800)  O2 Device: Nasal cannula (03/15/22 1053)  O2 Flow Rate (L/min): 3 l/min (03/15/22 1053)    Estimated body mass index is 40.94 kg/m² as calculated from the following:    Height as of this encounter: 5' 5\" (1.651 m). Weight as of this encounter: 111.6 kg (246 lb). No intake or output data in the 24 hours ending 03/15/22 1216    Physical Exam  Vitals and nursing note reviewed.    Constitutional: General: She is not in acute distress. Appearance: Normal appearance. She is obese. HENT:      Head: Normocephalic. Eyes:      Extraocular Movements: Extraocular movements intact. Cardiovascular:      Rate and Rhythm: Normal rate. Pulses:           Radial pulses are 2+ on the left side. Pulmonary:      Effort: Pulmonary effort is normal. No respiratory distress. Musculoskeletal:         General: No deformity. Cervical back: No rigidity. Right lower leg: No edema. Left lower leg: No edema. Skin:     General: Skin is warm and dry. Findings: Bruising present. Neurological:      General: No focal deficit present. Mental Status: She is alert and oriented to person, place, and time. Psychiatric:         Mood and Affect: Mood and affect normal. Mood is not depressed. Behavior: Behavior normal. Behavior is not withdrawn. Behavior is cooperative.          Cognition and Memory: Cognition normal.           Signed:  Darian Seo MD

## 2022-03-17 LAB
BACTERIA SPEC CULT: NORMAL
SERVICE CMNT-IMP: NORMAL

## 2022-03-18 PROBLEM — E87.8 ELECTROLYTE OR FLUID DISORDER: Status: ACTIVE | Noted: 2022-03-14

## 2022-03-18 PROBLEM — E87.6 HYPOKALEMIA: Status: RESOLVED | Noted: 2022-03-14 | Resolved: 2022-03-15

## 2022-03-18 PROBLEM — E66.01 CLASS 3 SEVERE OBESITY DUE TO EXCESS CALORIES WITH SERIOUS COMORBIDITY AND BODY MASS INDEX (BMI) OF 40.0 TO 44.9 IN ADULT (HCC): Status: ACTIVE | Noted: 2022-03-10

## 2022-03-18 PROBLEM — R41.0 DELIRIUM: Status: ACTIVE | Noted: 2022-03-14

## 2022-03-18 PROBLEM — E66.813 CLASS 3 SEVERE OBESITY DUE TO EXCESS CALORIES WITH SERIOUS COMORBIDITY AND BODY MASS INDEX (BMI) OF 40.0 TO 44.9 IN ADULT: Status: ACTIVE | Noted: 2022-03-10
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
## 2022-03-19 PROBLEM — R13.19 ESOPHAGEAL DYSPHAGIA: Status: ACTIVE | Noted: 2022-03-14

## 2022-03-19 PROBLEM — I10 PRIMARY HYPERTENSION: Status: ACTIVE | Noted: 2022-03-08

## 2022-03-19 PROBLEM — R63.4 WEIGHT LOSS, UNINTENTIONAL: Status: ACTIVE | Noted: 2022-03-14

## 2022-03-19 PROBLEM — Z71.89 DO NOT RESUSCITATE DISCUSSION: Status: RESOLVED | Noted: 2022-03-14 | Resolved: 2022-03-15

## 2022-03-19 PROBLEM — E83.42 HYPOMAGNESEMIA: Status: RESOLVED | Noted: 2022-03-14 | Resolved: 2022-03-15

## 2022-03-19 PROBLEM — M17.0 PRIMARY OSTEOARTHRITIS OF BOTH KNEES: Status: ACTIVE | Noted: 2017-05-26

## 2022-03-19 PROBLEM — Z66 DO NOT RESUSCITATE: Status: ACTIVE | Noted: 2022-03-14

## 2022-03-19 PROBLEM — M75.101 ROTATOR CUFF TEAR ARTHROPATHY OF RIGHT SHOULDER: Status: ACTIVE | Noted: 2018-09-06

## 2022-03-19 PROBLEM — K21.9 GASTROESOPHAGEAL REFLUX DISEASE WITHOUT ESOPHAGITIS: Status: ACTIVE | Noted: 2020-01-16

## 2022-03-19 PROBLEM — M12.811 ROTATOR CUFF TEAR ARTHROPATHY OF RIGHT SHOULDER: Status: ACTIVE | Noted: 2018-09-06

## 2022-03-19 PROBLEM — E78.5 DYSLIPIDEMIA: Status: ACTIVE | Noted: 2022-03-10

## 2022-03-19 PROBLEM — I77.3 FIBROMUSCULAR DYSPLASIA (HCC): Status: ACTIVE | Noted: 2022-03-10

## 2022-03-19 PROBLEM — K64.8 OTHER HEMORRHOIDS: Status: ACTIVE | Noted: 2020-01-16

## 2022-03-19 PROBLEM — E44.0 MODERATE PROTEIN-CALORIE MALNUTRITION (HCC): Status: ACTIVE | Noted: 2022-03-14

## 2022-03-19 PROBLEM — R53.81 DEBILITATED PATIENT: Status: ACTIVE | Noted: 2022-03-12

## 2022-03-19 PROBLEM — J45.30 MILD PERSISTENT ASTHMA WITHOUT COMPLICATION: Status: ACTIVE | Noted: 2018-03-14

## 2022-03-20 PROBLEM — F39 MOOD DISORDER (HCC): Status: ACTIVE | Noted: 2022-03-14

## 2022-03-20 LAB
BACTERIA SPEC CULT: NORMAL
BACTERIA SPEC CULT: NORMAL
SERVICE CMNT-IMP: NORMAL
SERVICE CMNT-IMP: NORMAL

## 2022-03-24 PROBLEM — E87.1 HYPONATREMIA: Status: ACTIVE | Noted: 2022-03-15

## 2022-11-07 ENCOUNTER — OFFICE VISIT (OUTPATIENT)
Dept: CARDIOLOGY CLINIC | Age: 82
End: 2022-11-07
Payer: MEDICARE

## 2022-11-07 VITALS
HEART RATE: 60 BPM | HEIGHT: 65 IN | DIASTOLIC BLOOD PRESSURE: 74 MMHG | BODY MASS INDEX: 35.82 KG/M2 | SYSTOLIC BLOOD PRESSURE: 128 MMHG | WEIGHT: 215 LBS

## 2022-11-07 DIAGNOSIS — I50.32 CHRONIC DIASTOLIC HEART FAILURE (HCC): ICD-10-CM

## 2022-11-07 DIAGNOSIS — G47.33 OSA (OBSTRUCTIVE SLEEP APNEA): ICD-10-CM

## 2022-11-07 DIAGNOSIS — I10 PRIMARY HYPERTENSION: Primary | ICD-10-CM

## 2022-11-07 DIAGNOSIS — E66.01 CLASS 3 SEVERE OBESITY DUE TO EXCESS CALORIES WITH SERIOUS COMORBIDITY AND BODY MASS INDEX (BMI) OF 40.0 TO 44.9 IN ADULT (HCC): ICD-10-CM

## 2022-11-07 PROCEDURE — 4004F PT TOBACCO SCREEN RCVD TLK: CPT | Performed by: INTERNAL MEDICINE

## 2022-11-07 PROCEDURE — 1123F ACP DISCUSS/DSCN MKR DOCD: CPT | Performed by: INTERNAL MEDICINE

## 2022-11-07 PROCEDURE — 99214 OFFICE O/P EST MOD 30 MIN: CPT | Performed by: INTERNAL MEDICINE

## 2022-11-07 PROCEDURE — G8427 DOCREV CUR MEDS BY ELIG CLIN: HCPCS | Performed by: INTERNAL MEDICINE

## 2022-11-07 PROCEDURE — 3074F SYST BP LT 130 MM HG: CPT | Performed by: INTERNAL MEDICINE

## 2022-11-07 PROCEDURE — G8484 FLU IMMUNIZE NO ADMIN: HCPCS | Performed by: INTERNAL MEDICINE

## 2022-11-07 PROCEDURE — 3078F DIAST BP <80 MM HG: CPT | Performed by: INTERNAL MEDICINE

## 2022-11-07 PROCEDURE — G8400 PT W/DXA NO RESULTS DOC: HCPCS | Performed by: INTERNAL MEDICINE

## 2022-11-07 PROCEDURE — 1090F PRES/ABSN URINE INCON ASSESS: CPT | Performed by: INTERNAL MEDICINE

## 2022-11-07 PROCEDURE — G8417 CALC BMI ABV UP PARAM F/U: HCPCS | Performed by: INTERNAL MEDICINE

## 2022-11-07 RX ORDER — ALBUTEROL SULFATE 90 UG/1
2 AEROSOL, METERED RESPIRATORY (INHALATION) EVERY 6 HOURS PRN
COMMUNITY

## 2022-11-07 RX ORDER — HYDROCODONE BITARTRATE AND ACETAMINOPHEN 10; 325 MG/1; MG/1
1 TABLET ORAL EVERY 6 HOURS PRN
COMMUNITY
End: 2022-11-07

## 2022-11-07 RX ORDER — DILTIAZEM HYDROCHLORIDE 240 MG/1
240 CAPSULE, EXTENDED RELEASE ORAL DAILY
COMMUNITY
End: 2022-11-07 | Stop reason: ALTCHOICE

## 2022-11-07 RX ORDER — TRAMADOL HYDROCHLORIDE 50 MG/1
50 TABLET ORAL EVERY 6 HOURS PRN
COMMUNITY
End: 2022-11-07

## 2022-11-07 RX ORDER — FEXOFENADINE HCL 180 MG/1
180 TABLET ORAL DAILY
COMMUNITY

## 2022-11-07 RX ORDER — TIZANIDINE 4 MG/1
4 TABLET ORAL EVERY 6 HOURS PRN
COMMUNITY
End: 2022-11-07

## 2022-11-07 ASSESSMENT — ENCOUNTER SYMPTOMS: SHORTNESS OF BREATH: 0

## 2022-11-07 NOTE — PROGRESS NOTES
[address], [address] [place] [address]  PHONE: [phone] [address] Kalyan Ramon  1940      SUBJECTIVE:   Abe Pickens is a 80 y.o. female seen for a follow up visit regarding the following:     Chief Complaint   Patient presents with    Congestive Heart Failure    Hypertension       HPI:    Patient presents for follow-up. Has lost a significant amount of weight. Was told by her PCP to decrease her Bumex to every other day. She has not done this and her weight has remained stable. She has no edema. She has labs checked next month with PCP. She was told at last labs her renal function was normal.  Blood pressures well controlled. No dizziness, lightheadedness or syncope. No PND or orthopnea. She had been on Bumex 1 mg a day and had worsening volume overload requiring the dose to be increased. Past Medical History, Past Surgical History, Family history, Social History, and Medications were all reviewed with the patient today and updated as necessary.            Current Outpatient Medications:     Multiple Vitamin (MULTIVITAMIN ADULT PO), Take by mouth, Disp: , Rfl:     fexofenadine (ALLEGRA) 180 MG tablet, Take 180 mg by mouth daily, Disp: , Rfl:     albuterol sulfate HFA (PROVENTIL;VENTOLIN;PROAIR) 108 (90 Base) MCG/ACT inhaler, Inhale 2 puffs into the lungs every 6 hours as needed, Disp: , Rfl:     tiotropium (SPIRIVA) 18 MCG inhalation capsule, Inhale 18 mcg into the lungs daily, Disp: , Rfl:     carboxymethylcellulose 1 % ophthalmic solution, 1 drop 3 times daily, Disp: , Rfl:     Magnesium 400 MG CAPS, Take by mouth, Disp: , Rfl:     ALPRAZolam (XANAX) 0.5 MG tablet, Take 0.5 mg by mouth., Disp: , Rfl:     bumetanide (BUMEX) 1 MG tablet, Take 1 mg by mouth daily, Disp: , Rfl:     Calcium Carbonate-Vitamin D (CALCIUM-VITAMIN D) 600-125 MG-UNIT TABS, Take by mouth, Disp: , Rfl:     celecoxib (CELEBREX) 200 MG capsule, Take by mouth 2 times daily, Disp: , Rfl:     clotrimazole-betamethasone (LOTRISONE) 1-0.05 % cream, Apply topically as needed, Disp: , Rfl:     fluticasone (FLONASE) 50 MCG/ACT nasal spray, 2 sprays by Nasal route as needed, Disp: , Rfl:     fluticasone-salmeterol (ADVAIR HFA) 115-21 MCG/ACT inhaler, Inhale 2 puffs into the lungs 2 times daily, Disp: , Rfl:     guaiFENesin (MUCINEX) 600 MG extended release tablet, Take 600 mg by mouth 2 times daily, Disp: , Rfl:     lisinopril (PRINIVIL;ZESTRIL) 2.5 MG tablet, Take 2.5 mg by mouth daily, Disp: , Rfl:     metaxalone (SKELAXIN) 800 MG tablet, Take by mouth 2 times daily, Disp: , Rfl:     metoprolol succinate (TOPROL XL) 25 MG extended release tablet, Take 12.5 mg by mouth daily, Disp: , Rfl:     polyethylene glycol (GLYCOLAX) 17 GM/SCOOP powder, Take 17 g by mouth as needed, Disp: , Rfl:     potassium gluconate 550 mg tablet, Take 99 mg by mouth daily, Disp: , Rfl:     pravastatin (PRAVACHOL) 40 MG tablet, Take 40 mg by mouth, Disp: , Rfl:     RABEprazole (ACIPHEX) 20 MG tablet, Take 20 mg by mouth daily, Disp: , Rfl:     sertraline (ZOLOFT) 50 MG tablet, Take by mouth as needed, Disp: , Rfl:     triamcinolone (KENALOG) 0.1 % ointment, Apply topically as needed, Disp: , Rfl:      Allergies   Allergen Reactions    Benzalkonium Chloride Other (See Comments)     Benzalkonium  Conjunctivitis sx    Gold Sodium Thiosulfate Itching    Nickel Itching    Benzalkonium Other (See Comments)     Caused pink eye        Patient Active Problem List    Diagnosis Date Noted    Hyponatremia 03/15/2022     Priority: Low    Delirium 03/14/2022     Priority: Low    Electrolyte or fluid disorder 03/14/2022     Priority: Low    Esophageal dysphagia 03/14/2022     Priority: Low    Do not resuscitate 03/14/2022     Priority: Low    Weight loss, unintentional 03/14/2022     Priority: Low    Moderate protein-calorie malnutrition ([address] 03/14/2022     Priority: Low    Mood disorder ([facility] 03/14/2022     Priority: Low    Debilitated patient 03/12/2022     Priority: Low    Class 3 severe obesity due to excess calories with serious comorbidity and body mass index (BMI) of 40.0 to 44.9 in adult [facility]) 03/10/2022     Priority: Low    Dyslipidemia 03/10/2022     Priority: Low    Fibromuscular dysplasia ([facility] 03/10/2022     Priority: Low     renal        Primary hypertension 03/08/2022     Priority: Low    Other hemorrhoids 01/16/2020     Priority: Low     Large hemorrhoids noted on each colonoscopy  No rectal burning, itching and pain        Gastroesophageal reflux disease without esophagitis 01/16/2020     Priority: Low     currently on aciphex daily        Rotator cuff tear arthropathy of right shoulder 09/06/2018     Priority: Low    Mild persistent asthma without complication 50/54/2393     Priority: Low     Spirometry without obstruction, normal FVC and FEV1. DLCO is normal, not   corrected for hgb. Primary osteoarthritis of both knees 05/26/2017     Priority: Low    Eyelid retraction right lower eyelid 10/18/2016     Priority: Low    Myogenic ptosis of eyelid of both eyes 10/18/2016     Priority: Low    Eyelid retraction left lower eyelid 10/18/2016     Priority: Low    Lymphedema of both lower extremities 05/02/2016     Priority: Low    Chronic diastolic heart failure ([facility] 01/04/2016     Priority: Low     Admitted 0/29 with diastolic CHF and edema. ANABELLA (obstructive sleep apnea) 01/04/2016     Priority: Low     May 22, 2007 but night study demonstrated mild ANABELLA with AHI 10/h, PLM   index 10/h, titrated CPAP 8 cm H2O with residual RDI 6.6/h. Social History     Tobacco Use    Smoking status: Never    Smokeless tobacco: Never   Substance Use Topics    Alcohol use: Not on file       ROS:    Review of Systems   Constitutional: Negative for malaise/fatigue. Cardiovascular:  Negative for chest pain. Respiratory:  Negative for shortness of breath. Musculoskeletal:  Positive for arthritis.    Neurological:  Negative for focal weakness. Psychiatric/Behavioral:  Negative for depression. PHYSICAL EXAM:  Wt Readings from Last 3 Encounters:   11/07/22 215 lb (97.5 kg)   05/05/22 230 lb (104.3 kg)     BP Readings from Last 3 Encounters:   11/07/22 128/74   05/05/22 128/68   03/31/22 120/64     Pulse Readings from Last 3 Encounters:   11/07/22 60   05/05/22 76   03/31/22 88       Physical Exam  Constitutional:       General: She is not in acute distress. Appearance: Normal appearance. Neck:      Vascular: No carotid bruit. Cardiovascular:      Rate and Rhythm: Normal rate and regular rhythm. Pulmonary:      Breath sounds: Normal breath sounds. No wheezing. Abdominal:      General: There is no distension. Palpations: Abdomen is soft. Musculoskeletal:         General: No swelling. Skin:     General: Skin is warm and dry. Neurological:      General: No focal deficit present. Psychiatric:         Mood and Affect: Mood normal.       Medical problems and test results were reviewed with the patient today.        Lab Results   Component Value Date    WBC 9.4 03/15/2022    HGB 9.8 (L) 03/15/2022    HCT 32.0 (L) 03/15/2022    MCV 78.4 (L) 03/15/2022     03/15/2022       Lab Results   Component Value Date/Time     03/15/2022 04:04 AM    K 3.3 03/15/2022 04:04 AM    CL 98 03/15/2022 04:04 AM    CO2 29 03/15/2022 04:04 AM    BUN 24 03/15/2022 04:04 AM    CREATININE 0.70 03/15/2022 04:04 AM    GLUCOSE 119 03/15/2022 04:04 AM    CALCIUM 9.1 03/15/2022 04:04 AM        Lab Results   Component Value Date    CHOL 160 03/14/2022     Lab Results   Component Value Date    TRIG 93 03/14/2022     Lab Results   Component Value Date    HDL 50 03/14/2022     Lab Results   Component Value Date    LDLCALC 91.4 03/14/2022     Lab Results   Component Value Date    LABVLDL 18.6 03/14/2022     Lab Results   Component Value Date    CHOLHDLRATIO 3.2 03/14/2022        Data from outside records/labs from outside providers have been reviewed and summarized as noted in the HPI, past history and data review sections of this note       ASSESSMENT and PLAN      1. Chronic diastolic heart failure ([facility]  Appears well compensated. Would continue current diuretic dose unless she has renal insufficiency. Suspect her weight loss is from caloric restriction and not from excessive dehydration. Blood pressure appears to be well controlled. 2. Primary hypertension  BP controlled. Continue Lisinopril and Toprol. 3. ANABELLA (obstructive sleep apnea)  Continue CPAP. 4. Class 3 severe obesity due to excess calories with serious comorbidity and body mass index (BMI) of 40.0 to 44.9 in adult ([facility]  Continue weight loss efforts. Return in about 6 months (around 5/7/2023). Alona Dunbar MD  11/7/2022  2:07 PM    This note may have been dictated using speech recognition software.   As a result, error of speech recognition may have occurred

## 2023-05-11 PROBLEM — R63.4 WEIGHT LOSS, UNINTENTIONAL: Status: RESOLVED | Noted: 2022-03-14 | Resolved: 2023-05-11

## 2023-05-11 PROBLEM — F39 MOOD DISORDER (HCC): Status: RESOLVED | Noted: 2022-03-14 | Resolved: 2023-05-11

## 2023-05-11 PROBLEM — E44.0 MODERATE PROTEIN-CALORIE MALNUTRITION (HCC): Status: RESOLVED | Noted: 2022-03-14 | Resolved: 2023-05-11

## 2023-05-11 PROBLEM — R53.81 DEBILITATED PATIENT: Status: RESOLVED | Noted: 2022-03-12 | Resolved: 2023-05-11

## 2023-05-11 PROBLEM — E87.8 ELECTROLYTE OR FLUID DISORDER: Status: RESOLVED | Noted: 2022-03-14 | Resolved: 2023-05-11

## 2023-05-11 PROBLEM — R41.0 DELIRIUM: Status: RESOLVED | Noted: 2022-03-14 | Resolved: 2023-05-11

## 2023-05-12 ENCOUNTER — OFFICE VISIT (OUTPATIENT)
Age: 83
End: 2023-05-12

## 2023-05-12 VITALS
SYSTOLIC BLOOD PRESSURE: 134 MMHG | HEART RATE: 64 BPM | WEIGHT: 221.8 LBS | DIASTOLIC BLOOD PRESSURE: 82 MMHG | HEIGHT: 65 IN | BODY MASS INDEX: 36.96 KG/M2

## 2023-05-12 DIAGNOSIS — I89.0 LYMPHEDEMA OF BOTH LOWER EXTREMITIES: ICD-10-CM

## 2023-05-12 DIAGNOSIS — I50.32 CHRONIC DIASTOLIC HEART FAILURE (HCC): ICD-10-CM

## 2023-05-12 DIAGNOSIS — E78.5 DYSLIPIDEMIA: ICD-10-CM

## 2023-05-12 DIAGNOSIS — I10 PRIMARY HYPERTENSION: Primary | ICD-10-CM

## 2023-05-12 DIAGNOSIS — E66.01 CLASS 3 SEVERE OBESITY DUE TO EXCESS CALORIES WITH SERIOUS COMORBIDITY AND BODY MASS INDEX (BMI) OF 40.0 TO 44.9 IN ADULT (HCC): ICD-10-CM

## 2023-05-12 ASSESSMENT — ENCOUNTER SYMPTOMS: SHORTNESS OF BREATH: 0

## 2023-05-12 NOTE — PROGRESS NOTES
[address], [address] [place] [address]  PHONE: [phone] Danial Garcia  1940      SUBJECTIVE:   Shraddha Lopez is a 80 y.o. female seen for a follow up visit regarding the following:     Chief Complaint   Patient presents with    Congestive Heart Failure    Hypertension       HPI:  Seen today for follow-up. Multiple issues were addressed. Her last office visit was November 7, 2022. Diastolic heart failure: Patient denies any PND, orthopnea or lower extremity edema. BP controlled. Hypertension: Her PCP recently increased her lisinopril from 2.5 to 5 mg due to elevated blood pressure readings. Blood pressure today appears to be well controlled. Hyperlipidemia:  Tolerating Pravastatin. Edema: Controlled. Falls:  Several falls due to loss of balance. Uses rolling walker. Past Medical History, Past Surgical History, Family history, Social History, and Medications were all reviewed with the patient today and updated as necessary.            Current Outpatient Medications:     Multiple Vitamin (MULTIVITAMIN ADULT PO), Take by mouth, Disp: , Rfl:     fexofenadine (ALLEGRA) 180 MG tablet, Take 1 tablet by mouth daily, Disp: , Rfl:     albuterol sulfate HFA (PROVENTIL;VENTOLIN;PROAIR) 108 (90 Base) MCG/ACT inhaler, Inhale 2 puffs into the lungs every 6 hours as needed, Disp: , Rfl:     tiotropium (SPIRIVA) 18 MCG inhalation capsule, Inhale 1 capsule into the lungs daily, Disp: , Rfl:     carboxymethylcellulose 1 % ophthalmic solution, 1 drop 3 times daily, Disp: , Rfl:     Magnesium 400 MG CAPS, Take by mouth, Disp: , Rfl:     ALPRAZolam (XANAX) 0.5 MG tablet, Take 1 tablet by mouth., Disp: , Rfl:     bumetanide (BUMEX) 1 MG tablet, Take 2 tablets by mouth daily, Disp: , Rfl:     Calcium Carbonate-Vitamin D (CALCIUM-VITAMIN D) 600-125 MG-UNIT TABS, Take by mouth, Disp: , Rfl:     celecoxib (CELEBREX) 200 MG capsule, Take by mouth 2 times daily,

## 2023-05-17 ENCOUNTER — TELEPHONE (OUTPATIENT)
Age: 83
End: 2023-05-17

## 2023-05-17 NOTE — TELEPHONE ENCOUNTER
Subject: Chandler Morocoh EKG results                        Hi,  At a visit on May 12th my Mom, Chandler Morocho, was given an EKG. I came to that visit with her and you said she was good but we didn't discuss the EKG results. Is there anything on that test that needs to be checked or of concern? EKG done today and reviewed with patient showed:  Sinus  Rhythm   Voltage criteria for LVH  (R(I)+S(III) exceeds 2.50 mV)  -Voltage criteria w/o ST/T abnormality may be normal.    -Poor R-wave progression   Rate 74    Thanks for your help!     Britney Romano  [phone]

## 2023-11-03 ENCOUNTER — OFFICE VISIT (OUTPATIENT)
Age: 83
End: 2023-11-03
Payer: MEDICARE

## 2023-11-03 VITALS
OXYGEN SATURATION: 95 % | HEART RATE: 78 BPM | HEIGHT: 65 IN | SYSTOLIC BLOOD PRESSURE: 132 MMHG | BODY MASS INDEX: 36.91 KG/M2 | DIASTOLIC BLOOD PRESSURE: 84 MMHG

## 2023-11-03 DIAGNOSIS — E78.5 DYSLIPIDEMIA: ICD-10-CM

## 2023-11-03 DIAGNOSIS — G47.33 OSA (OBSTRUCTIVE SLEEP APNEA): ICD-10-CM

## 2023-11-03 DIAGNOSIS — I10 PRIMARY HYPERTENSION: ICD-10-CM

## 2023-11-03 DIAGNOSIS — I50.32 CHRONIC DIASTOLIC HEART FAILURE (HCC): Primary | ICD-10-CM

## 2023-11-03 PROCEDURE — G8484 FLU IMMUNIZE NO ADMIN: HCPCS | Performed by: INTERNAL MEDICINE

## 2023-11-03 PROCEDURE — 1090F PRES/ABSN URINE INCON ASSESS: CPT | Performed by: INTERNAL MEDICINE

## 2023-11-03 PROCEDURE — 1036F TOBACCO NON-USER: CPT | Performed by: INTERNAL MEDICINE

## 2023-11-03 PROCEDURE — 3075F SYST BP GE 130 - 139MM HG: CPT | Performed by: INTERNAL MEDICINE

## 2023-11-03 PROCEDURE — G8417 CALC BMI ABV UP PARAM F/U: HCPCS | Performed by: INTERNAL MEDICINE

## 2023-11-03 PROCEDURE — 1123F ACP DISCUSS/DSCN MKR DOCD: CPT | Performed by: INTERNAL MEDICINE

## 2023-11-03 PROCEDURE — 99214 OFFICE O/P EST MOD 30 MIN: CPT | Performed by: INTERNAL MEDICINE

## 2023-11-03 PROCEDURE — 3079F DIAST BP 80-89 MM HG: CPT | Performed by: INTERNAL MEDICINE

## 2023-11-03 PROCEDURE — G8400 PT W/DXA NO RESULTS DOC: HCPCS | Performed by: INTERNAL MEDICINE

## 2023-11-03 PROCEDURE — G8427 DOCREV CUR MEDS BY ELIG CLIN: HCPCS | Performed by: INTERNAL MEDICINE

## 2023-11-03 RX ORDER — LISINOPRIL 10 MG/1
10 TABLET ORAL DAILY
Qty: 90 TABLET | Refills: 3 | Status: SHIPPED | OUTPATIENT
Start: 2023-11-03

## 2023-11-03 RX ORDER — IPRATROPIUM BROMIDE 21 UG/1
2 SPRAY, METERED NASAL EVERY 12 HOURS
COMMUNITY

## 2023-11-03 RX ORDER — ROSUVASTATIN CALCIUM 40 MG/1
40 TABLET, COATED ORAL EVERY EVENING
COMMUNITY

## 2023-11-03 RX ORDER — LISINOPRIL 10 MG/1
10 TABLET ORAL DAILY
Qty: 90 TABLET | Refills: 3 | Status: SHIPPED | OUTPATIENT
Start: 2023-11-03 | End: 2023-11-03 | Stop reason: SDUPTHER

## 2023-11-03 ASSESSMENT — ENCOUNTER SYMPTOMS: SHORTNESS OF BREATH: 0

## 2023-11-03 NOTE — PROGRESS NOTES
[address] [place]  [address] [facility], [facility], [address]  PHONE: 310Sara Chavez  1940      SUBJECTIVE:   Sheila Lopez is a 80 y.o. female seen for a follow up visit regarding the following:     Chief Complaint   Patient presents with    Congestive Heart Failure    Hypertension       HPI:    Patient presents for follow-up. Last seen in May. Multiple issues addressed. Chronic diastolic heart failure: She has had no significant edema. Currently taking Bumex 2 mg a day. Blood pressure has been elevated at home but is controlled in our office today. Only taking lisinopril 5 mg a day. Hypertension: Blood pressure elevated. See above. Hyperlipidemia: Compliant with Crestor. Labs followed by PCP. Edema: No significant edema on today's exam.    Dizziness with turning head to right. Appears consistent with inner ear. Her PCP reportedly checked a carotid duplex which did not show any significant obstruction per patient report. I do not have these results. Past Medical History, Past Surgical History, Family history, Social History, and Medications were all reviewed with the patient today and updated as necessary.            Current Outpatient Medications:     rosuvastatin (CRESTOR) 40 MG tablet, Take 1 tablet by mouth every evening, Disp: , Rfl:     ipratropium (ATROVENT) 0.03 % nasal spray, 2 sprays by Each Nostril route in the morning and 2 sprays in the evening., Disp: , Rfl:     lisinopril (PRINIVIL;ZESTRIL) 10 MG tablet, Take 1 tablet by mouth daily, Disp: 90 tablet, Rfl: 3    Multiple Vitamin (MULTIVITAMIN ADULT PO), Take by mouth, Disp: , Rfl:     fexofenadine (ALLEGRA) 180 MG tablet, Take 1 tablet by mouth daily, Disp: , Rfl:     albuterol sulfate HFA (PROVENTIL;VENTOLIN;PROAIR) 108 (90 Base) MCG/ACT inhaler, Inhale 2 puffs into the lungs every 6 hours as needed, Disp: , Rfl:     tiotropium (SPIRIVA) 18 MCG inhalation capsule, Inhale 1 capsule

## 2024-05-02 PROBLEM — M75.101 ROTATOR CUFF TEAR ARTHROPATHY OF RIGHT SHOULDER: Status: RESOLVED | Noted: 2018-09-06 | Resolved: 2024-05-02

## 2024-05-02 PROBLEM — R13.19 ESOPHAGEAL DYSPHAGIA: Status: RESOLVED | Noted: 2022-03-14 | Resolved: 2024-05-02

## 2024-05-02 PROBLEM — M17.0 PRIMARY OSTEOARTHRITIS OF BOTH KNEES: Status: RESOLVED | Noted: 2017-05-26 | Resolved: 2024-05-02

## 2024-05-02 PROBLEM — M12.811 ROTATOR CUFF TEAR ARTHROPATHY OF RIGHT SHOULDER: Status: RESOLVED | Noted: 2018-09-06 | Resolved: 2024-05-02

## 2024-05-02 PROBLEM — E87.1 HYPONATREMIA: Status: RESOLVED | Noted: 2022-03-15 | Resolved: 2024-05-02

## 2024-05-03 ENCOUNTER — OFFICE VISIT (OUTPATIENT)
Age: 84
End: 2024-05-03
Payer: MEDICARE

## 2024-05-03 VITALS
BODY MASS INDEX: 36.91 KG/M2 | HEIGHT: 65 IN | SYSTOLIC BLOOD PRESSURE: 132 MMHG | HEART RATE: 61 BPM | DIASTOLIC BLOOD PRESSURE: 80 MMHG

## 2024-05-03 DIAGNOSIS — I10 PRIMARY HYPERTENSION: ICD-10-CM

## 2024-05-03 DIAGNOSIS — G47.33 OSA (OBSTRUCTIVE SLEEP APNEA): ICD-10-CM

## 2024-05-03 DIAGNOSIS — I50.32 CHRONIC DIASTOLIC HEART FAILURE (HCC): ICD-10-CM

## 2024-05-03 DIAGNOSIS — E78.5 DYSLIPIDEMIA: Primary | ICD-10-CM

## 2024-05-03 PROCEDURE — G8417 CALC BMI ABV UP PARAM F/U: HCPCS | Performed by: INTERNAL MEDICINE

## 2024-05-03 PROCEDURE — 3079F DIAST BP 80-89 MM HG: CPT | Performed by: INTERNAL MEDICINE

## 2024-05-03 PROCEDURE — 1036F TOBACCO NON-USER: CPT | Performed by: INTERNAL MEDICINE

## 2024-05-03 PROCEDURE — 1090F PRES/ABSN URINE INCON ASSESS: CPT | Performed by: INTERNAL MEDICINE

## 2024-05-03 PROCEDURE — 99214 OFFICE O/P EST MOD 30 MIN: CPT | Performed by: INTERNAL MEDICINE

## 2024-05-03 PROCEDURE — G8400 PT W/DXA NO RESULTS DOC: HCPCS | Performed by: INTERNAL MEDICINE

## 2024-05-03 PROCEDURE — 3075F SYST BP GE 130 - 139MM HG: CPT | Performed by: INTERNAL MEDICINE

## 2024-05-03 PROCEDURE — 1123F ACP DISCUSS/DSCN MKR DOCD: CPT | Performed by: INTERNAL MEDICINE

## 2024-05-03 PROCEDURE — G8427 DOCREV CUR MEDS BY ELIG CLIN: HCPCS | Performed by: INTERNAL MEDICINE

## 2024-05-03 PROCEDURE — 93000 ELECTROCARDIOGRAM COMPLETE: CPT | Performed by: INTERNAL MEDICINE

## 2024-05-03 RX ORDER — BUMETANIDE 2 MG/1
2 TABLET ORAL DAILY
Qty: 30 TABLET | Refills: 3
Start: 2024-05-03

## 2024-05-03 NOTE — PROGRESS NOTES
[address], [place] [address]  PHONE: [phone]    Laurence Kaur  1940      SUBJECTIVE:   Laurence Kaur is a 84 y.o. female seen for a follow up visit regarding the following:     Chief Complaint   Patient presents with    Congestive Heart Failure    Hypertension       HPI:    Patient presents for follow-up.  Multiple issues addressed.    Chronic diastolic heart failure: Her edema appears to be well-controlled with Bumex 2 mg a day.  Has labs followed by PCP with upcoming labs next month.    Hypertension: Blood pressure well-controlled.  Tolerating Toprol and lisinopril.    Sleep apnea:  CPAP.     Dyslipidemia: Tolerating Crestor.      Past Medical History, Past Surgical History, Family history, Social History, and Medications were all reviewed with the patient today and updated as necessary.           Current Outpatient Medications:     empagliflozin (JARDIANCE) 10 MG tablet, Take 1 tablet by mouth daily, Disp: 90 tablet, Rfl: 3    bumetanide (BUMEX) 2 MG tablet, Take 1 tablet by mouth daily, Disp: 30 tablet, Rfl: 3    rosuvastatin (CRESTOR) 40 MG tablet, Take 1 tablet by mouth every evening, Disp: , Rfl:     ipratropium (ATROVENT) 0.03 % nasal spray, 2 sprays by Each Nostril route in the morning and 2 sprays in the evening., Disp: , Rfl:     lisinopril (PRINIVIL;ZESTRIL) 10 MG tablet, Take 1 tablet by mouth daily, Disp: 90 tablet, Rfl: 3    Multiple Vitamin (MULTIVITAMIN ADULT PO), Take by mouth, Disp: , Rfl:     fexofenadine (ALLEGRA) 180 MG tablet, Take 1 tablet by mouth daily, Disp: , Rfl:     albuterol sulfate HFA (PROVENTIL;VENTOLIN;PROAIR) 108 (90 Base) MCG/ACT inhaler, Inhale 2 puffs into the lungs every 6 hours as needed, Disp: , Rfl:     tiotropium (SPIRIVA) 18 MCG inhalation capsule, Inhale 1 capsule into the lungs daily, Disp: , Rfl:     carboxymethylcellulose 1 % ophthalmic solution, 1 drop 3 times daily, Disp: , Rfl:     Magnesium 400 MG

## 2024-05-10 ENCOUNTER — TELEPHONE (OUTPATIENT)
Age: 84
End: 2024-05-10

## 2024-05-10 NOTE — TELEPHONE ENCOUNTER
Daughter, Mi, on HIPAA, called. She wanted to make sure that there was not medication change at last appointment. The AVS states that there was medication change of Bumex 2 mg, but she is already taking this. Daughter informed that the medication Bumex was refilled and this may be the reason for medication change. Per Dr Prince's note, there was not medication changes.Daughter informed  Bumex is a diuretic - water pill- Jardiance is not, but can increase urination when used with a diuretic.Jardiance can help with CHF. Mi voiced understanding and thanked me//skylar  ----- Message from Pushpa Lindsey MA sent at 5/10/2024  9:34 AM EDT -----  Regarding: FW: Bumetanide 2mg & Empagliflozin 10mg directions  Contact: [phone]    ----- Message -----  From: Laurence Kaur [place]  Sent: 5/10/2024   9:24 AM EDT  To: [facility] Clinical Staff  Subject: Bumetanide 2mg & Empagliflozin 10mg directio#    Hi,  I am writing about my Mom, Laurence Kaur's, medicines.      Could you clarify if she should take both Bumetanide (Bumex) 2mg one time daily AND the new medicine Empagliflozin (Jardiance) 10mg one time a day?  Also, are the medication strengths correct?    My  was with her at the appointment on May 3rd.  They thought you said to continue the medication she is currently taking but appointment notes has - change how to take bumetanide 2mg tablet & what changed was medication strength.  The strength 2mg listed is the same dosage of Bumetanide (Bumex) that she has been taking for quite a while so not sure what the change should be.    They also understood that she would begin taking Empagliflozin (Jardiance) 10mg which she has received.  Her insurance did pay for this at $38 for 90 days.    Should she take both of these medicines and are dosages correct (Bumetanide 2mg & Empagliflozin 10mg) one time daily for each.    Are both of these water pills?      Thanks for your help and care of my Mom.    Mi

## 2024-08-27 ENCOUNTER — OFFICE VISIT (OUTPATIENT)
Dept: AUDIOLOGY | Age: 84
End: 2024-08-27
Payer: MEDICARE

## 2024-08-27 ENCOUNTER — OFFICE VISIT (OUTPATIENT)
Dept: ENT CLINIC | Age: 84
End: 2024-08-27
Payer: MEDICARE

## 2024-08-27 VITALS
BODY MASS INDEX: 33.32 KG/M2 | HEIGHT: 65 IN | DIASTOLIC BLOOD PRESSURE: 78 MMHG | WEIGHT: 200 LBS | SYSTOLIC BLOOD PRESSURE: 128 MMHG

## 2024-08-27 DIAGNOSIS — H90.3 SENSORINEURAL HEARING LOSS, BILATERAL: Primary | ICD-10-CM

## 2024-08-27 DIAGNOSIS — R42 DIZZINESS: Primary | Chronic | ICD-10-CM

## 2024-08-27 DIAGNOSIS — G43.809 VESTIBULAR MIGRAINE: ICD-10-CM

## 2024-08-27 DIAGNOSIS — R09.82 PND (POST-NASAL DRIP): Chronic | ICD-10-CM

## 2024-08-27 PROCEDURE — G8417 CALC BMI ABV UP PARAM F/U: HCPCS | Performed by: PHYSICIAN ASSISTANT

## 2024-08-27 PROCEDURE — 92557 COMPREHENSIVE HEARING TEST: CPT | Performed by: AUDIOLOGIST

## 2024-08-27 PROCEDURE — 99204 OFFICE O/P NEW MOD 45 MIN: CPT | Performed by: PHYSICIAN ASSISTANT

## 2024-08-27 PROCEDURE — G8427 DOCREV CUR MEDS BY ELIG CLIN: HCPCS | Performed by: PHYSICIAN ASSISTANT

## 2024-08-27 PROCEDURE — G8400 PT W/DXA NO RESULTS DOC: HCPCS | Performed by: PHYSICIAN ASSISTANT

## 2024-08-27 PROCEDURE — 1036F TOBACCO NON-USER: CPT | Performed by: PHYSICIAN ASSISTANT

## 2024-08-27 PROCEDURE — 3074F SYST BP LT 130 MM HG: CPT | Performed by: PHYSICIAN ASSISTANT

## 2024-08-27 PROCEDURE — 1123F ACP DISCUSS/DSCN MKR DOCD: CPT | Performed by: PHYSICIAN ASSISTANT

## 2024-08-27 PROCEDURE — 3078F DIAST BP <80 MM HG: CPT | Performed by: PHYSICIAN ASSISTANT

## 2024-08-27 PROCEDURE — 1090F PRES/ABSN URINE INCON ASSESS: CPT | Performed by: PHYSICIAN ASSISTANT

## 2024-08-27 RX ORDER — TRIAMCINOLONE ACETONIDE 1 MG/G
CREAM TOPICAL
COMMUNITY
Start: 2024-08-01

## 2024-08-27 RX ORDER — AMLODIPINE BESYLATE 10 MG/1
10 TABLET ORAL DAILY
COMMUNITY
Start: 2018-01-16

## 2024-08-27 RX ORDER — FAMOTIDINE 20 MG/1
20 TABLET, FILM COATED ORAL 2 TIMES DAILY
COMMUNITY
Start: 2024-05-24

## 2024-08-27 RX ORDER — DESOXIMETASONE 0.5 MG/G
OINTMENT TOPICAL
COMMUNITY
Start: 2024-08-01

## 2024-08-27 RX ORDER — CODEINE PHOSPHATE AND GUAIFENESIN 10; 100 MG/5ML; MG/5ML
SOLUTION ORAL
COMMUNITY
Start: 2024-05-24

## 2024-08-27 RX ORDER — FUROSEMIDE 40 MG
40 TABLET ORAL DAILY
COMMUNITY

## 2024-08-27 RX ORDER — NYSTATIN 100000/ML
SUSPENSION, ORAL (FINAL DOSE FORM) ORAL
COMMUNITY
Start: 2024-08-16

## 2024-08-27 RX ORDER — ALBUTEROL SULFATE 0.83 MG/ML
SOLUTION RESPIRATORY (INHALATION)
COMMUNITY

## 2024-08-27 RX ORDER — NAFTIFINE HYDROCHLORIDE 2 G/100G
GEL TOPICAL
COMMUNITY
Start: 2024-06-11

## 2024-08-27 RX ORDER — VENLAFAXINE HYDROCHLORIDE 37.5 MG/1
37.5 CAPSULE, EXTENDED RELEASE ORAL DAILY
Qty: 30 CAPSULE | Refills: 3 | Status: SHIPPED | OUTPATIENT
Start: 2024-08-27 | End: 2024-08-27

## 2024-08-27 RX ORDER — CICLOPIROX OLAMINE 7.7 MG/G
CREAM TOPICAL
COMMUNITY
Start: 2024-08-01

## 2024-08-27 RX ORDER — VENLAFAXINE HYDROCHLORIDE 37.5 MG/1
37.5 CAPSULE, EXTENDED RELEASE ORAL PRN
Qty: 30 CAPSULE | Refills: 3 | Status: SHIPPED | OUTPATIENT
Start: 2024-08-27

## 2024-08-27 ASSESSMENT — ENCOUNTER SYMPTOMS
EYES NEGATIVE: 1
RESPIRATORY NEGATIVE: 1
ALLERGIC/IMMUNOLOGIC NEGATIVE: 1
GASTROINTESTINAL NEGATIVE: 1
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
## 2024-08-27 NOTE — PROGRESS NOTES
AUDIOLOGY EVALUATION    Laurence Kaur had Audiometry performed today.    The patient reports dizziness.     Results as follows:    Audiometry    Test Performed - Comprehensive Audiogram    Type of Loss - Right Ear: abnormal hearing: degree of loss is normal to moderately severe sensorineural hearing loss                           Left Ear: abnormal hearing: degree of loss is normal to moderately severe sensorineural hearing loss     SRT   Measurement Right Ear Left Ear   Value 20 20   Unit dB dB     Discrimination  Measurement Right Ear Left Ear   Value 100% 100%   Unit dB dB     Recommend  Binaural amplification and annual audios  Balwinder Acosta [facility]  Audiologist

## 2024-09-16 ENCOUNTER — OFFICE VISIT (OUTPATIENT)
Dept: NEUROLOGY | Age: 84
End: 2024-09-16
Payer: MEDICARE

## 2024-09-16 VITALS
DIASTOLIC BLOOD PRESSURE: 92 MMHG | RESPIRATION RATE: 16 BRPM | BODY MASS INDEX: 39.15 KG/M2 | HEIGHT: 65 IN | HEART RATE: 70 BPM | WEIGHT: 235 LBS | SYSTOLIC BLOOD PRESSURE: 193 MMHG | OXYGEN SATURATION: 96 %

## 2024-09-16 DIAGNOSIS — R26.81 UNSTEADY GAIT: ICD-10-CM

## 2024-09-16 DIAGNOSIS — Z91.81 HISTORY OF FALL: ICD-10-CM

## 2024-09-16 DIAGNOSIS — I10 HTN, GOAL BELOW 140/90: ICD-10-CM

## 2024-09-16 DIAGNOSIS — R51.9 NEW ONSET OF HEADACHES AFTER AGE 50: Primary | ICD-10-CM

## 2024-09-16 DIAGNOSIS — R42 VERTIGO: ICD-10-CM

## 2024-09-16 PROCEDURE — 1123F ACP DISCUSS/DSCN MKR DOCD: CPT | Performed by: NURSE PRACTITIONER

## 2024-09-16 PROCEDURE — G8417 CALC BMI ABV UP PARAM F/U: HCPCS | Performed by: NURSE PRACTITIONER

## 2024-09-16 PROCEDURE — 3078F DIAST BP <80 MM HG: CPT | Performed by: NURSE PRACTITIONER

## 2024-09-16 PROCEDURE — 1090F PRES/ABSN URINE INCON ASSESS: CPT | Performed by: NURSE PRACTITIONER

## 2024-09-16 PROCEDURE — G8400 PT W/DXA NO RESULTS DOC: HCPCS | Performed by: NURSE PRACTITIONER

## 2024-09-16 PROCEDURE — 1036F TOBACCO NON-USER: CPT | Performed by: NURSE PRACTITIONER

## 2024-09-16 PROCEDURE — 99204 OFFICE O/P NEW MOD 45 MIN: CPT | Performed by: NURSE PRACTITIONER

## 2024-09-16 PROCEDURE — 3077F SYST BP >= 140 MM HG: CPT | Performed by: NURSE PRACTITIONER

## 2024-09-16 PROCEDURE — G8427 DOCREV CUR MEDS BY ELIG CLIN: HCPCS | Performed by: NURSE PRACTITIONER

## 2024-09-16 ASSESSMENT — PATIENT HEALTH QUESTIONNAIRE - PHQ9
SUM OF ALL RESPONSES TO PHQ QUESTIONS 1-9: 2
SUM OF ALL RESPONSES TO PHQ9 QUESTIONS 1 & 2: 2
SUM OF ALL RESPONSES TO PHQ QUESTIONS 1-9: 2
SUM OF ALL RESPONSES TO PHQ QUESTIONS 1-9: 2
1. LITTLE INTEREST OR PLEASURE IN DOING THINGS: SEVERAL DAYS
SUM OF ALL RESPONSES TO PHQ QUESTIONS 1-9: 2
2. FEELING DOWN, DEPRESSED OR HOPELESS: SEVERAL DAYS

## 2024-09-16 ASSESSMENT — ENCOUNTER SYMPTOMS
BACK PAIN: 1
EYE DISCHARGE: 0
PHOTOPHOBIA: 1
EYE PAIN: 0
GASTROINTESTINAL NEGATIVE: 1
APNEA: 1

## 2024-10-01 ENCOUNTER — HOSPITAL ENCOUNTER (OUTPATIENT)
Age: 84
Discharge: HOME | End: 2024-10-03
Payer: MEDICARE

## 2024-10-01 DIAGNOSIS — R51.9 NEW ONSET OF HEADACHES AFTER AGE 50: ICD-10-CM

## 2024-10-01 DIAGNOSIS — R42 VERTIGO: ICD-10-CM

## 2024-10-01 PROCEDURE — 70551 MRI BRAIN STEM W/O DYE: CPT

## 2024-10-18 ENCOUNTER — OFFICE VISIT (OUTPATIENT)
Dept: NEUROLOGY | Age: 84
End: 2024-10-18
Payer: MEDICARE

## 2024-10-18 VITALS
OXYGEN SATURATION: 99 % | SYSTOLIC BLOOD PRESSURE: 151 MMHG | DIASTOLIC BLOOD PRESSURE: 83 MMHG | WEIGHT: 235 LBS | BODY MASS INDEX: 39.11 KG/M2 | HEART RATE: 92 BPM

## 2024-10-18 DIAGNOSIS — G43.109 MIGRAINE WITH AURA AND WITHOUT STATUS MIGRAINOSUS, NOT INTRACTABLE: Primary | ICD-10-CM

## 2024-10-18 PROCEDURE — 1036F TOBACCO NON-USER: CPT | Performed by: NURSE PRACTITIONER

## 2024-10-18 PROCEDURE — G8484 FLU IMMUNIZE NO ADMIN: HCPCS | Performed by: NURSE PRACTITIONER

## 2024-10-18 PROCEDURE — 1123F ACP DISCUSS/DSCN MKR DOCD: CPT | Performed by: NURSE PRACTITIONER

## 2024-10-18 PROCEDURE — 1090F PRES/ABSN URINE INCON ASSESS: CPT | Performed by: NURSE PRACTITIONER

## 2024-10-18 PROCEDURE — G8400 PT W/DXA NO RESULTS DOC: HCPCS | Performed by: NURSE PRACTITIONER

## 2024-10-18 PROCEDURE — G8427 DOCREV CUR MEDS BY ELIG CLIN: HCPCS | Performed by: NURSE PRACTITIONER

## 2024-10-18 PROCEDURE — 3077F SYST BP >= 140 MM HG: CPT | Performed by: NURSE PRACTITIONER

## 2024-10-18 PROCEDURE — G8417 CALC BMI ABV UP PARAM F/U: HCPCS | Performed by: NURSE PRACTITIONER

## 2024-10-18 PROCEDURE — 99214 OFFICE O/P EST MOD 30 MIN: CPT | Performed by: NURSE PRACTITIONER

## 2024-10-18 PROCEDURE — 3079F DIAST BP 80-89 MM HG: CPT | Performed by: NURSE PRACTITIONER

## 2024-10-18 ASSESSMENT — ENCOUNTER SYMPTOMS
EYE PAIN: 0
PHOTOPHOBIA: 1
EYE DISCHARGE: 0
BACK PAIN: 1
APNEA: 1
GASTROINTESTINAL NEGATIVE: 1

## 2024-10-18 ASSESSMENT — PATIENT HEALTH QUESTIONNAIRE - PHQ9
SUM OF ALL RESPONSES TO PHQ QUESTIONS 1-9: 0
1. LITTLE INTEREST OR PLEASURE IN DOING THINGS: NOT AT ALL
SUM OF ALL RESPONSES TO PHQ QUESTIONS 1-9: 0
2. FEELING DOWN, DEPRESSED OR HOPELESS: NOT AT ALL
SUM OF ALL RESPONSES TO PHQ QUESTIONS 1-9: 0
SUM OF ALL RESPONSES TO PHQ9 QUESTIONS 1 & 2: 0
SUM OF ALL RESPONSES TO PHQ QUESTIONS 1-9: 0

## 2024-10-18 NOTE — PROGRESS NOTES
ideas. The patient is nervous/anxious (personal hx).          Physical Examination  BP (!) 151/83   Pulse 92   Wt 106.6 kg (235 lb)   SpO2 99%   BMI 39.11 kg/m²     General - Well developed, obese, in no apparent distress. Pleasant and conversent.   HEENT - Normocephalic, atraumatic. Conjunctiva, tympanic membranes, and oropharynx are clear.   Neck - Supple without masses, no bruits   Cardiovascular - Regular rate and rhythm.   Lungs - Clear to auscultation.  Abdomen - Soft, nontender with normal bowel sounds.   Extremities - Peripheral pulses intact. +3 edema BLE and no rashes.     Neurological examination - Comprehension, attention , memory and reasoning are intact. Language and speech are normal. On cranial nerve examination pupils are equal round and reactive to light. Fundoscopic examination is normal. Visual acuity is adequate. Visual fields are full to finger confrontation. Extraocular motility is normal. Face is symmetric and sensation is intact to light touch. Hearing is diminished to finger rustle bilaterally. Motor examination - There is normal muscle tone and bulk. Power is 4/5 proximal BUE due to chronic rotator cuff injuries, 4/5 BLE due to chronic knee pain. Muscle stretch reflexes are 1+ throughout. Sensation is intact to light touch, pinprick, vibration and proprioception in all extremities. Cerebellar examination is normal. Gait and stance antalgic, wide stance, unsteady, ambulating with rollator. Slow to stand. Negative romberg.     MRI Result (most recent):  MRI BRAIN WO CONTRAST 10/01/2024    Narrative  MRI of the brain    INDICATION: Headache, unspecified; Dizziness and giddiness    TECHNIQUE: Standard MRI sequences were obtained through the brain in multiple  planes without IV contrast    COMPARISON:  None    FINDINGS:  White matter hyperintensity likely representing moderate chronic microvascular  ischemia.  There is no evidence of acute hemorrhage or infarction. Mild  parenchymal

## 2024-10-25 ENCOUNTER — TELEPHONE (OUTPATIENT)
Dept: NEUROLOGY | Age: 84
End: 2024-10-25

## 2024-10-25 NOTE — TELEPHONE ENCOUNTER
Patients daughter called stating patient needs a PA for Nurtec and that [facility] is telling her that they need additional information. Please reach out to her regarding this PA.    Also, she would like to confirm the quantity of medications to be filled. She has conflicting information between the script and the pharmacy.

## 2024-11-01 ENCOUNTER — OFFICE VISIT (OUTPATIENT)
Age: 84
End: 2024-11-01

## 2024-11-01 VITALS
HEART RATE: 62 BPM | DIASTOLIC BLOOD PRESSURE: 80 MMHG | SYSTOLIC BLOOD PRESSURE: 138 MMHG | HEIGHT: 65 IN | BODY MASS INDEX: 38.82 KG/M2 | WEIGHT: 233 LBS

## 2024-11-01 DIAGNOSIS — G47.33 OSA (OBSTRUCTIVE SLEEP APNEA): ICD-10-CM

## 2024-11-01 DIAGNOSIS — I10 PRIMARY HYPERTENSION: Primary | ICD-10-CM

## 2024-11-01 DIAGNOSIS — I50.32 CHRONIC DIASTOLIC HEART FAILURE (HCC): ICD-10-CM

## 2024-11-01 DIAGNOSIS — E66.01 CLASS 3 SEVERE OBESITY DUE TO EXCESS CALORIES WITH SERIOUS COMORBIDITY AND BODY MASS INDEX (BMI) OF 40.0 TO 44.9 IN ADULT: ICD-10-CM

## 2024-11-01 DIAGNOSIS — E66.813 CLASS 3 SEVERE OBESITY DUE TO EXCESS CALORIES WITH SERIOUS COMORBIDITY AND BODY MASS INDEX (BMI) OF 40.0 TO 44.9 IN ADULT: ICD-10-CM

## 2024-11-01 DIAGNOSIS — E78.5 DYSLIPIDEMIA: ICD-10-CM

## 2024-11-01 RX ORDER — AMLODIPINE AND BENAZEPRIL HYDROCHLORIDE 5; 10 MG/1; MG/1
1 CAPSULE ORAL DAILY
Qty: 90 CAPSULE | Refills: 3 | Status: SHIPPED | OUTPATIENT
Start: 2024-11-01

## 2024-11-01 ASSESSMENT — ENCOUNTER SYMPTOMS: SHORTNESS OF BREATH: 0

## 2024-11-01 NOTE — PROGRESS NOTES
[address], [address]  [place] [address]  PHONE: [phone]    Laurence Kaur  1940      SUBJECTIVE:   Laurence Kaur is a 84 y.o. female seen for a follow up visit regarding the following:     Chief Complaint   Patient presents with    Hypertension    Congestive Heart Failure       HPI:    Laurence Kaur presents for routine follow. Multiple issues addressed as outlined below:     Chronic Diastolic Heart Failure  UNABLE TO TOLERATE JARDIANCE DUE TO YEAST INFECTIONS  Status: Patient denies any PND or orthopnea.  Unable to tolerate Jardiance due to yeast infection.  Has no edema.  Very limited in her lifestyle given her obesity and arthritis.  Medications:  Bumex 2 mg daily.    Prior History:     Admitted 3/22 with diastolic CHF and edema.   Echo (3/9/22):  EF 50-55%.  +DD.  Mild LAE.  Normal RV      Hypertension  Status:  Ambulatory BP readings have been elevated.  Patient reports compliance with medical therapy without side effects.    Medications: Lisinopril 10 mg daily.  Toprol-XL 25 mg half tablet daily.      Hyperlipidemia  Status:  Cholesterol panel  followed by PCP and patient reports lipids under acceptable control.  Tolerating current statin dose without side effects. .  Medications: Crestor 40 mg nightly.       Obesity  Status:  BMI 38.7.     Pertinent PMHx:   ANABELLA: On CPAP.      Past Medical History, Past Surgical History, Family history, Social History, and Medications were all reviewed with the patient today and updated as necessary.           Current Outpatient Medications:     amLODIPine-benazepril (LOTREL) 5-10 MG per capsule, Take 1 capsule by mouth daily, Disp: 90 capsule, Rfl: 3    rimegepant sulfate 75 MG TBDP, Take 75 mg by mouth every other day, Disp: 48 tablet, Rfl: 3    ciclopirox (LOPROX) 0.77 % cream, APPLY TO RASH UNDER SKIN FOLDS TWICE DAILY MIX WITH TRIAMCINOLONE CREAM, Disp: , Rfl:     desoximetasone (TOPICORT) 0.05 % OINT oitment,

## 2025-05-09 ENCOUNTER — OFFICE VISIT (OUTPATIENT)
Age: 85
End: 2025-05-09
Payer: MEDICARE

## 2025-05-09 VITALS
DIASTOLIC BLOOD PRESSURE: 78 MMHG | WEIGHT: 239 LBS | SYSTOLIC BLOOD PRESSURE: 130 MMHG | HEIGHT: 65 IN | BODY MASS INDEX: 39.82 KG/M2 | HEART RATE: 63 BPM

## 2025-05-09 DIAGNOSIS — E66.813 CLASS 3 SEVERE OBESITY DUE TO EXCESS CALORIES WITH SERIOUS COMORBIDITY AND BODY MASS INDEX (BMI) OF 40.0 TO 44.9 IN ADULT (HCC): ICD-10-CM

## 2025-05-09 DIAGNOSIS — E78.5 DYSLIPIDEMIA: ICD-10-CM

## 2025-05-09 DIAGNOSIS — I50.32 CHRONIC DIASTOLIC HEART FAILURE (HCC): ICD-10-CM

## 2025-05-09 DIAGNOSIS — I10 PRIMARY HYPERTENSION: Primary | ICD-10-CM

## 2025-05-09 PROCEDURE — G8400 PT W/DXA NO RESULTS DOC: HCPCS | Performed by: INTERNAL MEDICINE

## 2025-05-09 PROCEDURE — 93000 ELECTROCARDIOGRAM COMPLETE: CPT | Performed by: INTERNAL MEDICINE

## 2025-05-09 PROCEDURE — G8427 DOCREV CUR MEDS BY ELIG CLIN: HCPCS | Performed by: INTERNAL MEDICINE

## 2025-05-09 PROCEDURE — 1123F ACP DISCUSS/DSCN MKR DOCD: CPT | Performed by: INTERNAL MEDICINE

## 2025-05-09 PROCEDURE — 1090F PRES/ABSN URINE INCON ASSESS: CPT | Performed by: INTERNAL MEDICINE

## 2025-05-09 PROCEDURE — 1126F AMNT PAIN NOTED NONE PRSNT: CPT | Performed by: INTERNAL MEDICINE

## 2025-05-09 PROCEDURE — G8417 CALC BMI ABV UP PARAM F/U: HCPCS | Performed by: INTERNAL MEDICINE

## 2025-05-09 PROCEDURE — 3078F DIAST BP <80 MM HG: CPT | Performed by: INTERNAL MEDICINE

## 2025-05-09 PROCEDURE — 3075F SYST BP GE 130 - 139MM HG: CPT | Performed by: INTERNAL MEDICINE

## 2025-05-09 PROCEDURE — 1036F TOBACCO NON-USER: CPT | Performed by: INTERNAL MEDICINE

## 2025-05-09 PROCEDURE — 1159F MED LIST DOCD IN RCRD: CPT | Performed by: INTERNAL MEDICINE

## 2025-05-09 PROCEDURE — 99214 OFFICE O/P EST MOD 30 MIN: CPT | Performed by: INTERNAL MEDICINE

## 2025-05-09 RX ORDER — AMLODIPINE AND BENAZEPRIL HYDROCHLORIDE 5; 10 MG/1; MG/1
1 CAPSULE ORAL DAILY
Qty: 90 CAPSULE | Refills: 3 | Status: SHIPPED | OUTPATIENT
Start: 2025-05-09

## 2025-05-09 ASSESSMENT — ENCOUNTER SYMPTOMS: SHORTNESS OF BREATH: 0

## 2025-05-09 NOTE — PROGRESS NOTES
[facility] CARDIOLOGY, [address], [address]  [place] [address]  PHONE: [phone]    Laurence Kaur  1940      SUBJECTIVE:   Laurence Kaur is a 85 y.o. female seen for a follow up visit regarding the following:     Chief Complaint   Patient presents with    Hypertension    Congestive Heart Failure       HPI:    Laurence Kaur presents for routine follow. Multiple issues addressed as outlined below:     Chronic Diastolic Heart Failure  Status: Patient denies any PND, orthopnea or edema.   Very limited in her lifestyle given her obesity and arthritis.  Recent labs done by her PCP in March showed a creatinine of 0.86, potassium 4.2 and hemoglobin 13.7  Medications:  Bumex 2 mg daily.    UNABLE TO TOLERATE JARDIANCE DUE TO YEAST INFECTIONS  Prior History:     Admitted 3/22 with diastolic CHF and edema.   Echo (3/9/22):  EF 50-55%.  +DD.  Mild LAE.  Normal RV      Hypertension  Status: Patient's blood pressure last visit was elevated.  Her lisinopril was changed to Lotrel 5/10 mg a day.  Blood pressure much improved.  Medications: Lotrel 5/10 mg daily.  Toprol-XL 25 mg half tablet daily.      Hyperlipidemia  Status:  Cholesterol panel  is outlined below and patient is tolerating current statin therapy..  Medications: Crestor 40 mg nightly.  Labs: She brings in a copy of her labs from her PCP.  Most recent total cholesterol is 190, HDL 79, LDL of 90 and triglyceride 116       Obesity  Status:  BMI 39.     EKG done today and reviewed with patient showed:  Sinus Rhythm   Low voltage in precordial leads.  -Poor R-wave progression  Rate 63      Pertinent PMHx:   ANABELLA: On CPAP.      Past Medical History, Past Surgical History, Family history, Social History, and Medications were all reviewed with the patient today and updated as necessary.           Current Outpatient Medications:     amLODIPine-benazepril (LOTREL) 5-10 MG per capsule, Take 1 capsule by mouth daily, Disp: 90 capsule, Rfl: 3

## 2025-05-23 ENCOUNTER — APPOINTMENT (OUTPATIENT)
Dept: GENERAL RADIOLOGY | Age: 85
DRG: 064 | End: 2025-05-23
Payer: MEDICARE

## 2025-05-23 ENCOUNTER — APPOINTMENT (OUTPATIENT)
Dept: CT IMAGING | Age: 85
DRG: 064 | End: 2025-05-23
Payer: MEDICARE

## 2025-05-23 ENCOUNTER — HOSPITAL ENCOUNTER (INPATIENT)
Age: 85
LOS: 7 days | Discharge: HOME | DRG: 064 | End: 2025-05-30
Attending: STUDENT IN AN ORGANIZED HEALTH CARE EDUCATION/TRAINING PROGRAM | Admitting: STUDENT IN AN ORGANIZED HEALTH CARE EDUCATION/TRAINING PROGRAM
Payer: MEDICARE

## 2025-05-23 ENCOUNTER — HOSPITAL ENCOUNTER (EMERGENCY)
Age: 85
Discharge: ANOTHER HOSPITAL | DRG: 064 | End: 2025-05-23
Attending: GENERAL PRACTICE
Payer: MEDICARE

## 2025-05-23 VITALS
TEMPERATURE: 97.9 F | SYSTOLIC BLOOD PRESSURE: 182 MMHG | DIASTOLIC BLOOD PRESSURE: 91 MMHG | WEIGHT: 240 LBS | HEART RATE: 81 BPM | OXYGEN SATURATION: 94 % | BODY MASS INDEX: 39.99 KG/M2 | HEIGHT: 65 IN | RESPIRATION RATE: 19 BRPM

## 2025-05-23 DIAGNOSIS — M17.0 PRIMARY OSTEOARTHRITIS OF BOTH KNEES: ICD-10-CM

## 2025-05-23 DIAGNOSIS — I63.9 CEREBROVASCULAR ACCIDENT (CVA), UNSPECIFIED MECHANISM (HCC): Primary | ICD-10-CM

## 2025-05-23 DIAGNOSIS — F41.9 ANXIETY: ICD-10-CM

## 2025-05-23 DIAGNOSIS — I63.9 ACUTE STROKE DUE TO ISCHEMIA (HCC): Primary | ICD-10-CM

## 2025-05-23 LAB
25(OH)D3 SERPL-MCNC: 50.3 NG/ML (ref 30–100)
ALBUMIN SERPL-MCNC: 3.8 G/DL (ref 3.2–4.6)
ALBUMIN/GLOB SERPL: 1.4 (ref 1–1.9)
ALP SERPL-CCNC: 125 U/L (ref 35–104)
ALT SERPL-CCNC: 12 U/L (ref 8–45)
ANION GAP BLD CALC-SCNC: 11.8 MMOL/L
APPEARANCE UR: CLEAR
AST SERPL-CCNC: 22 U/L (ref 15–37)
BACTERIA URNS QL MICRO: 0 /HPF
BASOPHILS # BLD: 0.04 K/UL (ref 0–0.2)
BASOPHILS NFR BLD: 0.6 % (ref 0–2)
BILIRUB DIRECT SERPL-MCNC: 0.2 MG/DL (ref 0–0.3)
BILIRUB SERPL-MCNC: 0.3 MG/DL (ref 0–1.2)
BILIRUB UR QL: NEGATIVE
CHLORIDE BLD-SCNC: 104 MMOL/L (ref 98–107)
CO2 BLD-SCNC: 27.2 MMOL/L (ref 21–32)
COLOR UR: YELLOW
DIFFERENTIAL METHOD BLD: NORMAL
EKG ATRIAL RATE: 58 BPM
EKG DIAGNOSIS: NORMAL
EKG P AXIS: 66 DEGREES
EKG P-R INTERVAL: 193 MS
EKG Q-T INTERVAL: 440 MS
EKG QRS DURATION: 106 MS
EKG QTC CALCULATION (BAZETT): 436 MS
EKG R AXIS: -24 DEGREES
EKG T AXIS: 62 DEGREES
EKG VENTRICULAR RATE: 59 BPM
EOSINOPHIL # BLD: 0.17 K/UL (ref 0–0.8)
EOSINOPHIL NFR BLD: 2.7 % (ref 0.5–7.8)
EPI CELLS #/AREA URNS HPF: NORMAL /HPF
ERYTHROCYTE [DISTWIDTH] IN BLOOD BY AUTOMATED COUNT: 14.2 % (ref 11.9–14.6)
GLOBULIN SER CALC-MCNC: 2.8 G/DL (ref 2.3–3.5)
GLUCOSE BLD STRIP.AUTO-MCNC: 123 MG/DL (ref 65–100)
GLUCOSE BLD-MCNC: 123 MG/DL
GLUCOSE BLD-MCNC: 94 MG/DL (ref 65–100)
GLUCOSE UR STRIP.AUTO-MCNC: NEGATIVE MG/DL
HCT VFR BLD AUTO: 43 % (ref 35.8–46.3)
HGB BLD-MCNC: 14 G/DL (ref 11.7–15.4)
HGB UR QL STRIP: ABNORMAL
IMM GRANULOCYTES # BLD AUTO: 0.02 K/UL (ref 0–0.5)
IMM GRANULOCYTES NFR BLD AUTO: 0.3 % (ref 0–5)
INR PPP: 1
KETONES UR QL STRIP.AUTO: NEGATIVE MG/DL
LEUKOCYTE ESTERASE UR QL STRIP.AUTO: ABNORMAL
LYMPHOCYTES # BLD: 2.03 K/UL (ref 0.5–4.6)
LYMPHOCYTES NFR BLD: 31.7 % (ref 13–44)
MCH RBC QN AUTO: 30 PG (ref 26.1–32.9)
MCHC RBC AUTO-ENTMCNC: 32.6 G/DL (ref 31.4–35)
MCV RBC AUTO: 92.3 FL (ref 82–102)
MONOCYTES # BLD: 0.47 K/UL (ref 0.1–1.3)
MONOCYTES NFR BLD: 7.3 % (ref 4–12)
MUCOUS THREADS URNS QL MICRO: 0 /LPF
NEUTS SEG # BLD: 3.68 K/UL (ref 1.7–8.2)
NEUTS SEG NFR BLD: 57.4 % (ref 43–78)
NITRITE UR QL STRIP.AUTO: NEGATIVE
NRBC # BLD: 0 K/UL (ref 0–0.2)
OTHER OBSERVATIONS: NORMAL
PH UR STRIP: 7.5 (ref 5–9)
PLATELET # BLD AUTO: 232 K/UL (ref 150–450)
PMV BLD AUTO: 10.8 FL (ref 9.4–12.3)
POTASSIUM BLD-SCNC: 3.1 MMOL/L (ref 3.5–5.1)
PROT SERPL-MCNC: 6.6 G/DL (ref 6.3–8.2)
PROT UR STRIP-MCNC: NEGATIVE MG/DL
PROTHROMBIN TIME: 13 SEC (ref 11.3–14.9)
RBC # BLD AUTO: 4.66 M/UL (ref 4.05–5.2)
RBC #/AREA URNS HPF: NORMAL /HPF
SERVICE CMNT-IMP: ABNORMAL
SODIUM BLD-SCNC: 143 MMOL/L (ref 136–145)
SP GR UR REFRACTOMETRY: 1.01 (ref 1–1.02)
TROPONIN T SERPL HS-MCNC: 15.1 NG/L (ref 0–14)
UROBILINOGEN UR QL STRIP.AUTO: 0.2 EU/DL (ref 0.2–1)
WBC # BLD AUTO: 6.4 K/UL (ref 4.3–11.1)
WBC URNS QL MICRO: NORMAL /HPF

## 2025-05-23 PROCEDURE — 70450 CT HEAD/BRAIN W/O DYE: CPT

## 2025-05-23 PROCEDURE — 93005 ELECTROCARDIOGRAM TRACING: CPT | Performed by: GENERAL PRACTICE

## 2025-05-23 PROCEDURE — 5A09457 ASSISTANCE WITH RESPIRATORY VENTILATION, 24-96 CONSECUTIVE HOURS, CONTINUOUS POSITIVE AIRWAY PRESSURE: ICD-10-PCS | Performed by: STUDENT IN AN ORGANIZED HEALTH CARE EDUCATION/TRAINING PROGRAM

## 2025-05-23 PROCEDURE — 94760 N-INVAS EAR/PLS OXIMETRY 1: CPT

## 2025-05-23 PROCEDURE — 85025 COMPLETE CBC W/AUTO DIFF WBC: CPT

## 2025-05-23 PROCEDURE — 85610 PROTHROMBIN TIME: CPT

## 2025-05-23 PROCEDURE — 73562 X-RAY EXAM OF KNEE 3: CPT

## 2025-05-23 PROCEDURE — 36415 COLL VENOUS BLD VENIPUNCTURE: CPT

## 2025-05-23 PROCEDURE — 94761 N-INVAS EAR/PLS OXIMETRY MLT: CPT

## 2025-05-23 PROCEDURE — 73630 X-RAY EXAM OF FOOT: CPT

## 2025-05-23 PROCEDURE — 93010 ELECTROCARDIOGRAM REPORT: CPT | Performed by: INTERNAL MEDICINE

## 2025-05-23 PROCEDURE — 81001 URINALYSIS AUTO W/SCOPE: CPT

## 2025-05-23 PROCEDURE — 1100000003 HC PRIVATE W/ TELEMETRY

## 2025-05-23 PROCEDURE — 82962 GLUCOSE BLOOD TEST: CPT

## 2025-05-23 PROCEDURE — 6370000000 HC RX 637 (ALT 250 FOR IP): Performed by: STUDENT IN AN ORGANIZED HEALTH CARE EDUCATION/TRAINING PROGRAM

## 2025-05-23 PROCEDURE — 2700000000 HC OXYGEN THERAPY PER DAY

## 2025-05-23 PROCEDURE — 80047 BASIC METABLC PNL IONIZED CA: CPT

## 2025-05-23 PROCEDURE — 2500000003 HC RX 250 WO HCPCS: Performed by: STUDENT IN AN ORGANIZED HEALTH CARE EDUCATION/TRAINING PROGRAM

## 2025-05-23 PROCEDURE — 94762 N-INVAS EAR/PLS OXIMTRY CONT: CPT

## 2025-05-23 PROCEDURE — 84484 ASSAY OF TROPONIN QUANT: CPT

## 2025-05-23 PROCEDURE — 82306 VITAMIN D 25 HYDROXY: CPT

## 2025-05-23 PROCEDURE — 80076 HEPATIC FUNCTION PANEL: CPT

## 2025-05-23 PROCEDURE — 99285 EMERGENCY DEPT VISIT HI MDM: CPT

## 2025-05-23 PROCEDURE — 73610 X-RAY EXAM OF ANKLE: CPT

## 2025-05-23 PROCEDURE — 6370000000 HC RX 637 (ALT 250 FOR IP): Performed by: INTERNAL MEDICINE

## 2025-05-23 RX ORDER — PANTOPRAZOLE SODIUM 40 MG/1
40 TABLET, DELAYED RELEASE ORAL
Status: DISCONTINUED | OUTPATIENT
Start: 2025-05-24 | End: 2025-05-30 | Stop reason: HOSPADM

## 2025-05-23 RX ORDER — TRIAMCINOLONE ACETONIDE 1 MG/G
CREAM TOPICAL 2 TIMES DAILY
Status: DISCONTINUED | OUTPATIENT
Start: 2025-05-23 | End: 2025-05-30 | Stop reason: HOSPADM

## 2025-05-23 RX ORDER — ROSUVASTATIN CALCIUM 20 MG/1
40 TABLET, COATED ORAL EVERY EVENING
Status: DISCONTINUED | OUTPATIENT
Start: 2025-05-23 | End: 2025-05-30 | Stop reason: HOSPADM

## 2025-05-23 RX ORDER — MULTIVITAMIN WITH IRON
1 TABLET ORAL DAILY
Status: DISCONTINUED | OUTPATIENT
Start: 2025-05-23 | End: 2025-05-30 | Stop reason: HOSPADM

## 2025-05-23 RX ORDER — DESOXIMETASONE 0.5 MG/G
OINTMENT TOPICAL DAILY
Status: DISCONTINUED | OUTPATIENT
Start: 2025-05-24 | End: 2025-05-30 | Stop reason: HOSPADM

## 2025-05-23 RX ORDER — CLOPIDOGREL BISULFATE 75 MG/1
75 TABLET ORAL DAILY
Status: DISCONTINUED | OUTPATIENT
Start: 2025-05-23 | End: 2025-05-29

## 2025-05-23 RX ORDER — METOPROLOL SUCCINATE 25 MG/1
12.5 TABLET, EXTENDED RELEASE ORAL DAILY
Status: DISCONTINUED | OUTPATIENT
Start: 2025-05-23 | End: 2025-05-30 | Stop reason: HOSPADM

## 2025-05-23 RX ORDER — CETIRIZINE HYDROCHLORIDE 5 MG/1
5 TABLET ORAL DAILY
Status: DISCONTINUED | OUTPATIENT
Start: 2025-05-24 | End: 2025-05-30 | Stop reason: HOSPADM

## 2025-05-23 RX ORDER — ATORVASTATIN CALCIUM 40 MG/1
80 TABLET, FILM COATED ORAL NIGHTLY
Status: DISCONTINUED | OUTPATIENT
Start: 2025-05-23 | End: 2025-05-28

## 2025-05-23 RX ORDER — SODIUM CHLORIDE 9 MG/ML
INJECTION, SOLUTION INTRAVENOUS PRN
Status: DISCONTINUED | OUTPATIENT
Start: 2025-05-23 | End: 2025-05-30 | Stop reason: HOSPADM

## 2025-05-23 RX ORDER — ONDANSETRON 4 MG/1
4 TABLET, ORALLY DISINTEGRATING ORAL EVERY 8 HOURS PRN
Status: DISCONTINUED | OUTPATIENT
Start: 2025-05-23 | End: 2025-05-30 | Stop reason: HOSPADM

## 2025-05-23 RX ORDER — CELECOXIB 200 MG/1
200 CAPSULE ORAL 2 TIMES DAILY
Status: DISCONTINUED | OUTPATIENT
Start: 2025-05-23 | End: 2025-05-26

## 2025-05-23 RX ORDER — METAXALONE 800 MG/1
400 TABLET ORAL 2 TIMES DAILY
Status: DISCONTINUED | OUTPATIENT
Start: 2025-05-23 | End: 2025-05-30 | Stop reason: HOSPADM

## 2025-05-23 RX ORDER — CARBOXYMETHYLCELLULOSE SODIUM 10 MG/ML
1 GEL OPHTHALMIC 3 TIMES DAILY
Status: DISCONTINUED | OUTPATIENT
Start: 2025-05-23 | End: 2025-05-30 | Stop reason: HOSPADM

## 2025-05-23 RX ORDER — BUMETANIDE 1 MG/1
2 TABLET ORAL DAILY
Status: DISCONTINUED | OUTPATIENT
Start: 2025-05-23 | End: 2025-05-30 | Stop reason: HOSPADM

## 2025-05-23 RX ORDER — NYSTATIN 100000 [USP'U]/ML
0.5 SUSPENSION ORAL 4 TIMES DAILY
Status: DISCONTINUED | OUTPATIENT
Start: 2025-05-23 | End: 2025-05-30 | Stop reason: HOSPADM

## 2025-05-23 RX ORDER — SODIUM CHLORIDE 0.9 % (FLUSH) 0.9 %
5-40 SYRINGE (ML) INJECTION PRN
Status: DISCONTINUED | OUTPATIENT
Start: 2025-05-23 | End: 2025-05-30 | Stop reason: HOSPADM

## 2025-05-23 RX ORDER — AMLODIPINE AND BENAZEPRIL HYDROCHLORIDE 5; 10 MG/1; MG/1
1 CAPSULE ORAL DAILY
Status: DISCONTINUED | OUTPATIENT
Start: 2025-05-23 | End: 2025-05-24 | Stop reason: CLARIF

## 2025-05-23 RX ORDER — ONDANSETRON 2 MG/ML
4 INJECTION INTRAMUSCULAR; INTRAVENOUS EVERY 6 HOURS PRN
Status: DISCONTINUED | OUTPATIENT
Start: 2025-05-23 | End: 2025-05-30 | Stop reason: HOSPADM

## 2025-05-23 RX ORDER — POTASSIUM CHLORIDE 1500 MG/1
40 TABLET, EXTENDED RELEASE ORAL 2 TIMES DAILY WITH MEALS
Status: COMPLETED | OUTPATIENT
Start: 2025-05-23 | End: 2025-05-24

## 2025-05-23 RX ORDER — POLYETHYLENE GLYCOL 3350 17 G/17G
17 POWDER, FOR SOLUTION ORAL DAILY PRN
Status: DISCONTINUED | OUTPATIENT
Start: 2025-05-23 | End: 2025-05-30 | Stop reason: HOSPADM

## 2025-05-23 RX ORDER — MICONAZOLE NITRATE 20 MG/G
CREAM TOPICAL 2 TIMES DAILY
Status: DISCONTINUED | OUTPATIENT
Start: 2025-05-23 | End: 2025-05-30 | Stop reason: HOSPADM

## 2025-05-23 RX ORDER — IPRATROPIUM BROMIDE 21 UG/1
2 SPRAY, METERED NASAL EVERY 12 HOURS
Status: DISCONTINUED | OUTPATIENT
Start: 2025-05-23 | End: 2025-05-30 | Stop reason: HOSPADM

## 2025-05-23 RX ORDER — GUAIFENESIN 600 MG/1
600 TABLET, EXTENDED RELEASE ORAL 2 TIMES DAILY
Status: DISCONTINUED | OUTPATIENT
Start: 2025-05-23 | End: 2025-05-30 | Stop reason: HOSPADM

## 2025-05-23 RX ORDER — ALPRAZOLAM 0.5 MG
0.5 TABLET ORAL NIGHTLY PRN
Status: DISCONTINUED | OUTPATIENT
Start: 2025-05-23 | End: 2025-05-30 | Stop reason: HOSPADM

## 2025-05-23 RX ORDER — SODIUM CHLORIDE 0.9 % (FLUSH) 0.9 %
5-40 SYRINGE (ML) INJECTION EVERY 12 HOURS SCHEDULED
Status: DISCONTINUED | OUTPATIENT
Start: 2025-05-23 | End: 2025-05-30 | Stop reason: HOSPADM

## 2025-05-23 RX ORDER — ALBUTEROL SULFATE 0.83 MG/ML
2.5 SOLUTION RESPIRATORY (INHALATION)
Status: DISCONTINUED | OUTPATIENT
Start: 2025-05-23 | End: 2025-05-24

## 2025-05-23 RX ADMIN — MICONAZOLE NITRATE: 20 CREAM TOPICAL at 20:56

## 2025-05-23 RX ADMIN — SODIUM CHLORIDE, PRESERVATIVE FREE 10 ML: 5 INJECTION INTRAVENOUS at 23:06

## 2025-05-23 RX ADMIN — B-COMPLEX W/ C & FOLIC ACID TAB 1 TABLET: TAB at 18:55

## 2025-05-23 RX ADMIN — ALPRAZOLAM 0.5 MG: 0.5 TABLET ORAL at 22:33

## 2025-05-23 RX ADMIN — POTASSIUM CHLORIDE 40 MEQ: 1500 TABLET, EXTENDED RELEASE ORAL at 18:51

## 2025-05-23 RX ADMIN — ATORVASTATIN CALCIUM 80 MG: 40 TABLET, FILM COATED ORAL at 20:53

## 2025-05-23 RX ADMIN — CELECOXIB 200 MG: 200 CAPSULE ORAL at 23:06

## 2025-05-23 RX ADMIN — ROSUVASTATIN CALCIUM 40 MG: 20 TABLET, FILM COATED ORAL at 18:51

## 2025-05-23 RX ADMIN — CLOPIDOGREL BISULFATE 75 MG: 75 TABLET, FILM COATED ORAL at 18:55

## 2025-05-23 RX ADMIN — IPRATROPIUM BROMIDE 2 SPRAY: 21 SPRAY, METERED NASAL at 18:51

## 2025-05-23 RX ADMIN — METAXALONE 400 MG: 800 TABLET ORAL at 23:06

## 2025-05-23 RX ADMIN — NYSTATIN 50000 UNITS: 100000 SUSPENSION ORAL at 20:53

## 2025-05-23 RX ADMIN — TRIAMCINOLONE ACETONIDE: 1 CREAM TOPICAL at 20:56

## 2025-05-23 RX ADMIN — GUAIFENESIN 600 MG: 600 TABLET, MULTILAYER, EXTENDED RELEASE ORAL at 20:53

## 2025-05-23 ASSESSMENT — PAIN SCALES - GENERAL
PAINLEVEL_OUTOF10: 9
PAINLEVEL_OUTOF10: 7
PAINLEVEL_OUTOF10: 0

## 2025-05-23 ASSESSMENT — PAIN - FUNCTIONAL ASSESSMENT: PAIN_FUNCTIONAL_ASSESSMENT: 0-10

## 2025-05-23 ASSESSMENT — PAIN DESCRIPTION - ORIENTATION
ORIENTATION: RIGHT
ORIENTATION: RIGHT

## 2025-05-23 ASSESSMENT — PAIN DESCRIPTION - LOCATION
LOCATION: SHOULDER;KNEE
LOCATION: FOOT;KNEE

## 2025-05-23 NOTE — PROGRESS NOTES
TRANSFER - IN REPORT:    Verbal report received from PATTIE Nielsen on Laurence Kaur  being received from [facility] ED for routine progression of patient care      Report consisted of patient's Situation, Background, Assessment and   Recommendations(SBAR).     Information from the following report(s) ED Encounter Summary, ED SBAR, Intake/Output, MAR, Recent Results, and Quality Measures was reviewed with the receiving nurse.    Opportunity for questions and clarification was provided.      Assessment will be completed upon patient's arrival to unit and care assumed.

## 2025-05-23 NOTE — ED TRIAGE NOTES
Pt via wheelchair to triage with daughter for reports of slurred speech & fall. Pt daughter states she noticed pt had slurred speech around 8pm while talking on the phone. Pt states she was standing in the bathroom around 8:30 this morning washing her face when her knee gave out & caused her to fall. Pt states she injured her R foot & R knee in the fall. Pt reports speech still seems off this morning.

## 2025-05-23 NOTE — ED PROVIDER NOTES
Emergency Department Provider Note       PCP: Yury Adamson MD   Age: 85 y.o.   Sex: female     DISPOSITION Decision To Transfer 05/23/2025 02:46:09 PM   DISPOSITION CONDITION Stable            ICD-10-CM    1. Cerebrovascular accident (CVA), unspecified mechanism (HCC)  I63.9           Medical Decision Making     Patient presents with fall and weakness.  Last known normal was last night.  The patient does have low NIH so that is not an LVO candidate.  Not a tPA candidate due to timeframe.  CT head is showing subacute infarct.  There is nothing to suggest fracture or dislocation or any other injuries to the right lower extremity.  Patient will be admitted to the hospital for further workup and management.  Patient will likely need MRI and another stroke workup including consultation with neurologist.     1 or more acute illnesses that pose a threat to life or bodily function.   Discussion with external consultants.  Chronic medical problems impacting care include hypertension.  Shared medical decision making was utilized in creating the patients health plan today.    I independently ordered and reviewed each unique test.  I reviewed external records: ED visit note from a different ED.   I reviewed external records: previous lab results from outside ED.  I reviewed external records: previous imaging study including radiologist interpretation.     I interpreted the X-rays x-ray right knee and right ankle shows no evidence of fracture or dislocation.  I have reviewed and agree with radiology report.  I interpreted the CT Scan CT scan of the brain shows no evidence of hemorrhage but there is suggestion of a subacute infarct in the centrum.  No other emergent pathology..  My Independent EKG Interpretation: sinus rhythm, no evidence of arrhythmia      ST Segments:Normal ST segments - NO STEMI   Rate: 59  The patient was admitted and I have discussed patient management with the admitting provider.          History  changes of the knee joint.         Electronically signed by DAVID VEGA      XR ANKLE RIGHT (MIN 3 VIEWS)   Final Result   Soft tissue swelling. No obvious acute bony fracture or joint   dislocations.         Electronically signed by DAVID VEGA      XR FOOT RIGHT (MIN 3 VIEWS)   Final Result   No obvious acute bony fracture or joint dislocations.      Bony spur at the plantar aspects of the calcaneus with soft tissue   calcification.            Electronically signed by DAVID VEGA         NIH Stroke Scale  Interval: Baseline  Level of Consciousness (1a): Alert  LOC Questions (1b): Answers both correctly  LOC Commands (1c): Performs both tasks correctly  Best Gaze (2): Normal  Visual (3): No visual loss  Facial Palsy (4): (!) Minor paralysis  Motor Arm, Left (5a): No drift  Motor Arm, Right (5b): No drift  Motor Leg, Left (6a): No drift  Motor Leg, Right (6b): No drift  Limb Ataxia (7): Absent  Sensory (8): Normal  Best Language (9): No aphasia  Dysarthria (10): Mild to moderate, slurs some words  Extinction and Inattention (11): No abnormality  Total: 2         No results for input(s): \"COVID19\" in the last 72 hours.    Voice dictation software was used during the making of this note.  This software is not perfect and grammatical and other typographical errors may be present.  This note has not been completely proofread for errors.     Jason Pierson DO  05/25/25 0718

## 2025-05-23 NOTE — H&P
Hospitalist History and Physical   Admit Date:  2025  4:38 PM   Name:  Laurence Kaur   Age:  85 y.o.  Sex:  female  :  1940   MRN:  [id number]   Room:  [facility]/    Presenting/Chief Complaint: No chief complaint on file.     Reason(s) for Admission: Acute stroke due to ischemia (HCC) [I63.9]     History of Present Illness:   Laurence Kaur is a 85 y.o. female with medical history of CHF, COPD, DVT, fibromuscular dysplasia, GERD, hyperlipidemia, hypertension, ANABELLA, osteoarthritis who presented with fall this morning.  She has dry eyes and she was in her face with the baby soap.  When she was washing her right knee gave out and she fell down.  She was complaining of slurred speech.  Patient denies fever, chills, nausea, vomiting, diarrhea.      Assessment & Plan:   Acute stroke due to ischemia (HCC)  CT head showed small subacute infarct in the right centrum.  Neurochecks q4h  Tele monitoring  Follow-up with echo with bubble study  Follow-up with MRI of head and MRA   We will maintain permissive hypertension for 24 hours  Follow-up with ST/PT/OT  Started  aspirin, Plavix  and high intensity statin  Check TSH, A1c, lipid panel, vitamin D, Cardiac enzymes  Neurology is consulted, appreciate recommendations    Hypokalemia  Potassium was 3.1 and repleted  Follow-up with repeat K in a.m.    CHF  Continue home medications    COPD  Continue inhalers    \GERD  PPI    Hyperlipidemia  F/U Lipid profile  Continue statin    Hypertension  Holding antihypertensives to maintain permissive hypertension    ANABELLA  CPAP    Dispo Anticipate hospital stay for 1-2 days     PT/OT evals ordered?  Therapy evals ordered  Diet: No diet orders on file  VTE prophylaxis: Lovenox  Code status: Full Code  Code status discussed: No  Blood consent obtained: No      Non-peripheral Lines and Tubes (if present):             Hospital Problems:  Principal Problem:    Acute stroke due to ischemia (HCC)  Resolved Problems:    * No  9.4 - 12.3 FL    nRBC 0.00 0.0 - 0.2 K/uL    Differential Type AUTOMATED      Neutrophils % 57.4 43.0 - 78.0 %    Lymphocytes % 31.7 13.0 - 44.0 %    Monocytes % 7.3 4.0 - 12.0 %    Eosinophils % 2.7 0.5 - 7.8 %    Basophils % 0.6 0.0 - 2.0 %    Immature Granulocytes % 0.3 0.0 - 5.0 %    Neutrophils Absolute 3.68 1.70 - 8.20 K/UL    Lymphocytes Absolute 2.03 0.50 - 4.60 K/UL    Monocytes Absolute 0.47 0.10 - 1.30 K/UL    Eosinophils Absolute 0.17 0.00 - 0.80 K/UL    Basophils Absolute 0.04 0.00 - 0.20 K/UL    Immature Granulocytes Absolute 0.02 0.0 - 0.5 K/UL   Protime-INR    Collection Time: 05/23/25 10:24 AM   Result Value Ref Range    Protime 13.0 11.3 - 14.9 sec    INR 1.0     Troponin    Collection Time: 05/23/25 10:24 AM   Result Value Ref Range    Troponin T 15.1 (H) 0 - 14 ng/L   Hepatic Function Panel    Collection Time: 05/23/25 12:38 PM   Result Value Ref Range    Total Protein 6.6 6.3 - 8.2 g/dL    Albumin 3.8 3.2 - 4.6 g/dL    Globulin 2.8 2.3 - 3.5 g/dL    Albumin/Globulin Ratio 1.4 1.0 - 1.9      Total Bilirubin 0.3 0.0 - 1.2 MG/DL    Bilirubin, Direct 0.2 0.0 - 0.3 MG/DL    Alk Phosphatase 125 (H) 35 - 104 U/L    AST 22 15 - 37 U/L    ALT 12 8 - 45 U/L   Urinalysis    Collection Time: 05/23/25 12:51 PM   Result Value Ref Range    Color, UA YELLOW      Appearance CLEAR      Specific Gravity, UA 1.015 1.001 - 1.023      pH, Urine 7.5 5.0 - 9.0      Protein, UA Negative NEG mg/dL    Glucose, Ur Negative NEG mg/dL    Ketones, Urine Negative NEG mg/dL    Bilirubin, Urine Negative NEG      Blood, Urine TRACE (A) NEG      Urobilinogen, Urine 0.2 0.2 - 1.0 EU/dL    Nitrite, Urine Negative NEG      Leukocyte Esterase, Urine TRACE (A) NEG     Urinalysis, Micro    Collection Time: 05/23/25 12:51 PM   Result Value Ref Range    WBC, UA 3-5 0 /hpf    RBC, UA 0-3 0 /hpf    Epithelial Cells, UA 0-3 0 /hpf    BACTERIA, URINE 0 0 /hpf    Mucus, UA 0 0 /lpf    Other observations RESULTS VERIFIED MANUALLY     POC CHEM 8

## 2025-05-24 ENCOUNTER — APPOINTMENT (OUTPATIENT)
Dept: NON INVASIVE DIAGNOSTICS | Age: 85
DRG: 064 | End: 2025-05-24
Attending: STUDENT IN AN ORGANIZED HEALTH CARE EDUCATION/TRAINING PROGRAM
Payer: MEDICARE

## 2025-05-24 ENCOUNTER — APPOINTMENT (OUTPATIENT)
Dept: MRI IMAGING | Age: 85
DRG: 064 | End: 2025-05-24
Attending: STUDENT IN AN ORGANIZED HEALTH CARE EDUCATION/TRAINING PROGRAM
Payer: MEDICARE

## 2025-05-24 LAB
ANION GAP SERPL CALC-SCNC: 10 MMOL/L (ref 7–16)
BUN SERPL-MCNC: 20 MG/DL (ref 8–23)
CALCIUM SERPL-MCNC: 9.3 MG/DL (ref 8.8–10.2)
CHLORIDE SERPL-SCNC: 106 MMOL/L (ref 98–107)
CHOLEST SERPL-MCNC: 149 MG/DL (ref 0–200)
CO2 SERPL-SCNC: 26 MMOL/L (ref 20–29)
CREAT SERPL-MCNC: 0.75 MG/DL (ref 0.6–1.1)
ECHO AO ASC DIAM: 3.5 CM
ECHO AO ASCENDING AORTA INDEX: 1.64 CM/M2
ECHO AO ROOT DIAM: 3 CM
ECHO AO ROOT INDEX: 1.4 CM/M2
ECHO AV AREA PEAK VELOCITY: 2.3 CM2
ECHO AV AREA VTI: 2.2 CM2
ECHO AV AREA/BSA PEAK VELOCITY: 1.1 CM2/M2
ECHO AV AREA/BSA VTI: 1 CM2/M2
ECHO AV MEAN GRADIENT: 8 MMHG
ECHO AV MEAN VELOCITY: 1.3 M/S
ECHO AV PEAK GRADIENT: 16 MMHG
ECHO AV PEAK VELOCITY: 2 M/S
ECHO AV VELOCITY RATIO: 0.8
ECHO AV VTI: 43.4 CM
ECHO BSA: 2.24 M2
ECHO EST RA PRESSURE: 3 MMHG
ECHO IVC EXP: 1.1 CM
ECHO LA AREA 2C: 27.3 CM2
ECHO LA AREA 4C: 28 CM2
ECHO LA DIAMETER INDEX: 1.36 CM/M2
ECHO LA DIAMETER: 2.9 CM
ECHO LA MAJOR AXIS: 6.5 CM
ECHO LA MINOR AXIS: 7.2 CM
ECHO LA TO AORTIC ROOT RATIO: 0.97
ECHO LA VOL BP: 94 ML (ref 22–52)
ECHO LA VOL MOD A2C: 86 ML (ref 22–52)
ECHO LA VOL MOD A4C: 95 ML (ref 22–52)
ECHO LA VOL/BSA BIPLANE: 44 ML/M2 (ref 16–34)
ECHO LA VOLUME INDEX MOD A2C: 40 ML/M2 (ref 16–34)
ECHO LA VOLUME INDEX MOD A4C: 44 ML/M2 (ref 16–34)
ECHO LV E' LATERAL VELOCITY: 6.87 CM/S
ECHO LV E' SEPTAL VELOCITY: 4.84 CM/S
ECHO LV EDV A2C: 101 ML
ECHO LV EDV A4C: 107 ML
ECHO LV EDV INDEX A4C: 50 ML/M2
ECHO LV EDV NDEX A2C: 47 ML/M2
ECHO LV EF PHYSICIAN: 61 %
ECHO LV EJECTION FRACTION A2C: 61 %
ECHO LV EJECTION FRACTION A4C: 60 %
ECHO LV EJECTION FRACTION BIPLANE: 61 % (ref 55–100)
ECHO LV ESV A2C: 40 ML
ECHO LV ESV A4C: 43 ML
ECHO LV ESV INDEX A2C: 19 ML/M2
ECHO LV ESV INDEX A4C: 20 ML/M2
ECHO LV FRACTIONAL SHORTENING: 49 % (ref 28–44)
ECHO LV INTERNAL DIMENSION DIASTOLE INDEX: 2.76 CM/M2
ECHO LV INTERNAL DIMENSION DIASTOLIC: 5.9 CM (ref 3.9–5.3)
ECHO LV INTERNAL DIMENSION SYSTOLIC INDEX: 1.4 CM/M2
ECHO LV INTERNAL DIMENSION SYSTOLIC: 3 CM
ECHO LV IVSD: 0.8 CM (ref 0.6–0.9)
ECHO LV MASS 2D: 166.9 G (ref 67–162)
ECHO LV MASS INDEX 2D: 78 G/M2 (ref 43–95)
ECHO LV POSTERIOR WALL DIASTOLIC: 0.7 CM (ref 0.6–0.9)
ECHO LV RELATIVE WALL THICKNESS RATIO: 0.24
ECHO LVOT AREA: 2.8 CM2
ECHO LVOT AV VTI INDEX: 0.76
ECHO LVOT DIAM: 1.9 CM
ECHO LVOT MEAN GRADIENT: 5 MMHG
ECHO LVOT PEAK GRADIENT: 10 MMHG
ECHO LVOT PEAK VELOCITY: 1.6 M/S
ECHO LVOT STROKE VOLUME INDEX: 43.8 ML/M2
ECHO LVOT SV: 93.8 ML
ECHO LVOT VTI: 33.1 CM
ECHO MV A VELOCITY: 1.25 M/S
ECHO MV AREA VTI: 2.6 CM2
ECHO MV E DECELERATION TIME (DT): 297 MS
ECHO MV E VELOCITY: 0.69 M/S
ECHO MV E/A RATIO: 0.55
ECHO MV E/E' LATERAL: 10.04
ECHO MV E/E' RATIO (AVERAGED): 12.15
ECHO MV E/E' SEPTAL: 14.26
ECHO MV LVOT VTI INDEX: 1.1
ECHO MV MAX VELOCITY: 1.3 M/S
ECHO MV MEAN GRADIENT: 3 MMHG
ECHO MV MEAN VELOCITY: 0.8 M/S
ECHO MV PEAK GRADIENT: 7 MMHG
ECHO MV VTI: 36.5 CM
ECHO PV ACCELERATION TIME (AT): 90 MS
ECHO PV MAX VELOCITY: 1.3 M/S
ECHO PV PEAK GRADIENT: 7 MMHG
ECHO RIGHT VENTRICULAR SYSTOLIC PRESSURE (RVSP): 37 MMHG
ECHO RV BASAL DIMENSION: 4 CM
ECHO RV FREE WALL PEAK S': 11.7 CM/S
ECHO RV LONGITUDINAL DIMENSION: 6.3 CM
ECHO RV MID DIMENSION: 2.8 CM
ECHO RV TAPSE: 2.9 CM (ref 1.7–?)
ECHO TV REGURGITANT MAX VELOCITY: 2.93 M/S
ECHO TV REGURGITANT PEAK GRADIENT: 34 MMHG
ERYTHROCYTE [DISTWIDTH] IN BLOOD BY AUTOMATED COUNT: 14.3 % (ref 11.9–14.6)
EST. AVERAGE GLUCOSE BLD GHB EST-MCNC: 132 MG/DL
GLUCOSE SERPL-MCNC: 118 MG/DL (ref 70–99)
HBA1C MFR BLD: 6.2 % (ref 0–5.6)
HCT VFR BLD AUTO: 38.9 % (ref 35.8–46.3)
HDLC SERPL-MCNC: 68 MG/DL (ref 40–60)
HDLC SERPL: 2.2 (ref 0–5)
HGB BLD-MCNC: 12.4 G/DL (ref 11.7–15.4)
LDLC SERPL CALC-MCNC: 60 MG/DL (ref 0–100)
MCH RBC QN AUTO: 29.8 PG (ref 26.1–32.9)
MCHC RBC AUTO-ENTMCNC: 31.9 G/DL (ref 31.4–35)
MCV RBC AUTO: 93.5 FL (ref 82–102)
NRBC # BLD: 0 K/UL (ref 0–0.2)
PLATELET # BLD AUTO: 200 K/UL (ref 150–450)
PMV BLD AUTO: 11.4 FL (ref 9.4–12.3)
POTASSIUM SERPL-SCNC: 4.3 MMOL/L (ref 3.5–5.1)
RBC # BLD AUTO: 4.16 M/UL (ref 4.05–5.2)
SODIUM SERPL-SCNC: 142 MMOL/L (ref 136–145)
TRIGL SERPL-MCNC: 107 MG/DL (ref 0–150)
TSH, 3RD GENERATION: 2.15 UIU/ML (ref 0.27–4.2)
VLDLC SERPL CALC-MCNC: 21 MG/DL (ref 6–23)
WBC # BLD AUTO: 6.6 K/UL (ref 4.3–11.1)

## 2025-05-24 PROCEDURE — 84443 ASSAY THYROID STIM HORMONE: CPT

## 2025-05-24 PROCEDURE — 1100000003 HC PRIVATE W/ TELEMETRY

## 2025-05-24 PROCEDURE — 93306 TTE W/DOPPLER COMPLETE: CPT | Performed by: INTERNAL MEDICINE

## 2025-05-24 PROCEDURE — 6370000000 HC RX 637 (ALT 250 FOR IP): Performed by: INTERNAL MEDICINE

## 2025-05-24 PROCEDURE — 70551 MRI BRAIN STEM W/O DYE: CPT

## 2025-05-24 PROCEDURE — 6370000000 HC RX 637 (ALT 250 FOR IP): Performed by: STUDENT IN AN ORGANIZED HEALTH CARE EDUCATION/TRAINING PROGRAM

## 2025-05-24 PROCEDURE — 92610 EVALUATE SWALLOWING FUNCTION: CPT

## 2025-05-24 PROCEDURE — C8929 TTE W OR WO FOL WCON,DOPPLER: HCPCS

## 2025-05-24 PROCEDURE — 94760 N-INVAS EAR/PLS OXIMETRY 1: CPT

## 2025-05-24 PROCEDURE — 83036 HEMOGLOBIN GLYCOSYLATED A1C: CPT

## 2025-05-24 PROCEDURE — 97530 THERAPEUTIC ACTIVITIES: CPT

## 2025-05-24 PROCEDURE — 85027 COMPLETE CBC AUTOMATED: CPT

## 2025-05-24 PROCEDURE — 36415 COLL VENOUS BLD VENIPUNCTURE: CPT

## 2025-05-24 PROCEDURE — 97161 PT EVAL LOW COMPLEX 20 MIN: CPT

## 2025-05-24 PROCEDURE — 80061 LIPID PANEL: CPT

## 2025-05-24 PROCEDURE — 80048 BASIC METABOLIC PNL TOTAL CA: CPT

## 2025-05-24 PROCEDURE — 97535 SELF CARE MNGMENT TRAINING: CPT

## 2025-05-24 PROCEDURE — 2500000003 HC RX 250 WO HCPCS: Performed by: STUDENT IN AN ORGANIZED HEALTH CARE EDUCATION/TRAINING PROGRAM

## 2025-05-24 PROCEDURE — 94761 N-INVAS EAR/PLS OXIMETRY MLT: CPT

## 2025-05-24 PROCEDURE — 6360000004 HC RX CONTRAST MEDICATION: Performed by: STUDENT IN AN ORGANIZED HEALTH CARE EDUCATION/TRAINING PROGRAM

## 2025-05-24 PROCEDURE — 97165 OT EVAL LOW COMPLEX 30 MIN: CPT

## 2025-05-24 RX ORDER — ACETAMINOPHEN 325 MG/1
650 TABLET ORAL EVERY 6 HOURS PRN
Status: DISCONTINUED | OUTPATIENT
Start: 2025-05-24 | End: 2025-05-30 | Stop reason: HOSPADM

## 2025-05-24 RX ORDER — AMLODIPINE BESYLATE 5 MG/1
5 TABLET ORAL DAILY
Status: DISCONTINUED | OUTPATIENT
Start: 2025-05-24 | End: 2025-05-25

## 2025-05-24 RX ORDER — ALBUTEROL SULFATE 0.83 MG/ML
2.5 SOLUTION RESPIRATORY (INHALATION) EVERY 4 HOURS PRN
Status: DISCONTINUED | OUTPATIENT
Start: 2025-05-24 | End: 2025-05-30 | Stop reason: HOSPADM

## 2025-05-24 RX ORDER — LISINOPRIL 5 MG/1
10 TABLET ORAL DAILY
Status: DISCONTINUED | OUTPATIENT
Start: 2025-05-24 | End: 2025-05-25

## 2025-05-24 RX ADMIN — CELECOXIB 200 MG: 200 CAPSULE ORAL at 20:24

## 2025-05-24 RX ADMIN — SERTRALINE 25 MG: 50 TABLET, FILM COATED ORAL at 08:33

## 2025-05-24 RX ADMIN — CLOPIDOGREL BISULFATE 75 MG: 75 TABLET, FILM COATED ORAL at 08:34

## 2025-05-24 RX ADMIN — NYSTATIN 50000 UNITS: 100000 SUSPENSION ORAL at 08:37

## 2025-05-24 RX ADMIN — B-COMPLEX W/ C & FOLIC ACID TAB 1 TABLET: TAB at 08:32

## 2025-05-24 RX ADMIN — IPRATROPIUM BROMIDE 2 SPRAY: 21 SPRAY, METERED NASAL at 06:26

## 2025-05-24 RX ADMIN — ALPRAZOLAM 0.5 MG: 0.5 TABLET ORAL at 20:56

## 2025-05-24 RX ADMIN — METAXALONE 400 MG: 800 TABLET ORAL at 08:32

## 2025-05-24 RX ADMIN — SODIUM CHLORIDE, PRESERVATIVE FREE 10 ML: 5 INJECTION INTRAVENOUS at 22:16

## 2025-05-24 RX ADMIN — DESOXIMETASONE: 0.5 OINTMENT TOPICAL at 08:51

## 2025-05-24 RX ADMIN — NYSTATIN 50000 UNITS: 100000 SUSPENSION ORAL at 20:23

## 2025-05-24 RX ADMIN — Medication 1 TABLET: at 08:33

## 2025-05-24 RX ADMIN — MICONAZOLE NITRATE: 20 CREAM TOPICAL at 22:14

## 2025-05-24 RX ADMIN — TRIAMCINOLONE ACETONIDE: 1 CREAM TOPICAL at 22:15

## 2025-05-24 RX ADMIN — GUAIFENESIN 600 MG: 600 TABLET, MULTILAYER, EXTENDED RELEASE ORAL at 08:34

## 2025-05-24 RX ADMIN — METOPROLOL SUCCINATE 12.5 MG: 25 TABLET, EXTENDED RELEASE ORAL at 08:45

## 2025-05-24 RX ADMIN — ROSUVASTATIN CALCIUM 40 MG: 20 TABLET, FILM COATED ORAL at 17:06

## 2025-05-24 RX ADMIN — NYSTATIN 50000 UNITS: 100000 SUSPENSION ORAL at 17:06

## 2025-05-24 RX ADMIN — MICONAZOLE NITRATE: 20 CREAM TOPICAL at 08:47

## 2025-05-24 RX ADMIN — POTASSIUM CHLORIDE 40 MEQ: 1500 TABLET, EXTENDED RELEASE ORAL at 08:34

## 2025-05-24 RX ADMIN — LISINOPRIL 10 MG: 5 TABLET ORAL at 08:44

## 2025-05-24 RX ADMIN — IPRATROPIUM BROMIDE 2 SPRAY: 21 SPRAY, METERED NASAL at 17:07

## 2025-05-24 RX ADMIN — TRIAMCINOLONE ACETONIDE: 1 CREAM TOPICAL at 08:49

## 2025-05-24 RX ADMIN — SODIUM CHLORIDE, PRESERVATIVE FREE 10 ML: 5 INJECTION INTRAVENOUS at 08:48

## 2025-05-24 RX ADMIN — PANTOPRAZOLE SODIUM 40 MG: 40 TABLET, DELAYED RELEASE ORAL at 06:15

## 2025-05-24 RX ADMIN — ACETAMINOPHEN 650 MG: 325 TABLET, FILM COATED ORAL at 17:10

## 2025-05-24 RX ADMIN — METAXALONE 400 MG: 800 TABLET ORAL at 20:23

## 2025-05-24 RX ADMIN — CETIRIZINE HYDROCHLORIDE 5 MG: 5 TABLET ORAL at 08:33

## 2025-05-24 RX ADMIN — NYSTATIN 50000 UNITS: 100000 SUSPENSION ORAL at 14:18

## 2025-05-24 RX ADMIN — ATORVASTATIN CALCIUM 80 MG: 40 TABLET, FILM COATED ORAL at 20:23

## 2025-05-24 RX ADMIN — CELECOXIB 200 MG: 200 CAPSULE ORAL at 08:34

## 2025-05-24 RX ADMIN — AMLODIPINE BESYLATE 5 MG: 5 TABLET ORAL at 08:43

## 2025-05-24 RX ADMIN — SULFUR HEXAFLUORIDE 4 ML: KIT at 16:38

## 2025-05-24 RX ADMIN — BUMETANIDE 2 MG: 1 TABLET ORAL at 08:44

## 2025-05-24 RX ADMIN — GUAIFENESIN 600 MG: 600 TABLET, MULTILAYER, EXTENDED RELEASE ORAL at 20:24

## 2025-05-24 ASSESSMENT — PAIN SCALES - GENERAL
PAINLEVEL_OUTOF10: 2
PAINLEVEL_OUTOF10: 0
PAINLEVEL_OUTOF10: 9

## 2025-05-24 ASSESSMENT — PAIN DESCRIPTION - LOCATION: LOCATION: SHOULDER;KNEE;BACK

## 2025-05-24 ASSESSMENT — PAIN DESCRIPTION - ORIENTATION
ORIENTATION: RIGHT;POSTERIOR
ORIENTATION: RIGHT

## 2025-05-24 ASSESSMENT — PAIN DESCRIPTION - DESCRIPTORS: DESCRIPTORS: ACHING;SHARP

## 2025-05-24 NOTE — PROGRESS NOTES
Hospitalist Progress Note   Admit Date:  2025  4:38 PM   Name:  Laurence Kaur   Age:  85 y.o.  Sex:  female  :  1940   MRN:  [id number]   Room:  [facility]/    Presenting/Chief Complaint: No chief complaint on file.     Reason(s) for Admission: Acute stroke due to ischemia (HCC) [I63.9]     Hospital Course:   Laurence Kaur is a 85 y.o. female with medical history of CHF, COPD, DVT, fibromuscular dysplasia, GERD, hyperlipidemia, hypertension, ANABELLA, osteoarthritis who presented with fall.  She has dry eyes and she was in her face with the baby soap.  When she was washing her right knee gave out and she fell down.  She was complaining of slurred speech. CT head showed small subacute infarct in the right centrum.      Subjective & 24hr Events:   Patient was seen and examined at the bedside.  She was sitting comfortably in the chair.  She was complaining of right ankle pain.  She was asking for Tylenol. She had a BM.  Discussed with RN and family member at the bedside.       Assessment & Plan:   Acute stroke due to ischemia (HCC)  Neurochecks q4h  Tele monitoring  Obtain TTE  Follow-up with MRI of head and MRA   SLP evaluated and started on diet  PT/OT recommended physical therapy upon discharge  Continue  aspirin, Plavix  and high intensity statin  Check  A1c, lipid panel  Neurology is consulted, appreciate recommendations     Hypokalemia  Resolved    CHF  Continue home medications     COPD  Continue inhalers     \GERD  PPI     Hyperlipidemia  Continue statin     Hypertension  Resumed antihypertensives     ANABELLA  CPAP nightly    Right ankle pain  X-ray showed no fracture  Pain management with Tylenol     Dispo Anticipate hospital stay for 1-2 days      PT/OT evals ordered?  Therapy evals ordered  Diet: No diet orders on file  VTE prophylaxis: Lovenox  Code status: Full Code  Code status discussed: No  Blood consent obtained: No      Non-peripheral Lines and Tubes (if present):      External Urinary

## 2025-05-24 NOTE — ACP (ADVANCE CARE PLANNING)
Advance Care Planning   General Advance Care Planning (ACP) Conversation    Date of Conversation: 5/23/2025  Conducted with: Patient with Decision Making Capacity  Other persons present: None    Healthcare Decision Maker:   Mi Cat - Child - [phone]- Legal Next of Kin  Both Daughters - Legal Next of Kin        Effie Lim

## 2025-05-24 NOTE — PLAN OF CARE
Problem: Chronic Conditions and Co-morbidities  Goal: Patient's chronic conditions and co-morbidity symptoms are monitored and maintained or improved  5/24/2025 0017 by Selam Cornell RN  Outcome: Progressing  5/23/2025 1809 by Maxine Valdovinos RN  Outcome: Progressing     Problem: Discharge Planning  Goal: Discharge to home or other facility with appropriate resources  5/24/2025 0017 by Selam Cornell RN  Outcome: Progressing  5/23/2025 1809 by Maxine Valdovinos RN  Outcome: Progressing     Problem: Skin/Tissue Integrity  Goal: Skin integrity remains intact  Description: 1.  Monitor for areas of redness and/or skin breakdown2.  Assess vascular access sites hourly3.  Every 4-6 hours minimum:  Change oxygen saturation probe site4.  Every 4-6 hours:  If on nasal continuous positive airway pressure, respiratory therapy assess nares and determine need for appliance change or resting period  5/24/2025 0017 by Selam Cornell RN  Outcome: Progressing  5/23/2025 1809 by Maxine Valdovinos RN  Outcome: Progressing     Problem: ABCDS Injury Assessment  Goal: Absence of physical injury  5/24/2025 0017 by Sleam Cornell RN  Outcome: Progressing  5/23/2025 1809 by Maxine Valdovinos RN  Outcome: Progressing     Problem: Safety - Adult  Goal: Free from fall injury  5/24/2025 0017 by Selam Cornell RN  Outcome: Progressing  5/23/2025 1809 by Maxine Valdovinos RN  Outcome: Progressing     Problem: Pain  Goal: Verbalizes/displays adequate comfort level or baseline comfort level  Outcome: Progressing

## 2025-05-24 NOTE — PROGRESS NOTES
ACUTE PHYSICAL THERAPY GOALS:   (Developed with and agreed upon by patient and/or caregiver.)  STG:  (1.)Ms. Kaur will move from supine to sit and sit to supine  with SUPERVISION within 3 treatment day(s).    (2.)Ms. Kaur will transfer from bed to chair and chair to bed with STAND BY ASSIST using the least restrictive device within 3 treatment day(s).    (3.)Ms. Kaur will ambulate with STAND BY ASSIST for 25 feet with the least restrictive device within 3 treatment day(s).     LTG:  (1.)Ms. Kaur will move from supine to sit and sit to supine  in bed with INDEPENDENT within 7 treatment day(s).    (2.)Ms. Kaur will transfer from bed to chair and chair to bed with SUPERVISION using the least restrictive device within 7 treatment day(s).    (3.)Ms. Kaur will ambulate with SUPERVISION for 50 feet with the least restrictive device within 7 treatment day(s).  ________________________________________________________________________________________________      PHYSICAL THERAPY Initial Assessment  (Link to Caseload Tracking: PT Visit Days : 1  Acknowledge Orders  Time In/Out  PT Charge Capture  Rehab Caseload Tracker    Laurence Kaur is a 85 y.o. female   PRIMARY DIAGNOSIS: Acute stroke due to ischemia (HCC)  Acute stroke due to ischemia (HCC) [I63.9]       Reason for Referral: Generalized Muscle Weakness (M62.81)  Difficulty in walking, Not elsewhere classified (R26.2)  History of falling (Z91.81)  Inpatient: Payor: MEDICARE / Plan: MEDICARE PART A AND B / Product Type: *No Product type* /     ASSESSMENT:     REHAB RECOMMENDATIONS:   Recommendation to date pending progress:  Setting:  Continued physical therapy recommended at discharge.    Equipment:    None     ASSESSMENT:  Ms. Kaur admitted with above diagnosis.  She was brought to the ER with daughter with concerns for slurred speech and fall.  Patient had a fall at home when her knee gave out while standing at the bathroom sink.  Patient with a  TREATMENT PRECAUTIONS: Bed/Chair Locked, Call light within reach, Chair, Needs within reach, and Visitors at bedside    INTERDISCIPLINARY COLLABORATION:  RN/ PCT    EDUCATION: Education Given To: Patient  Education Provided: Role of Therapy;Plan of Care  Education Method: Verbal  Education Outcome: Verbalized understanding  Educated patient and/or family/caregiver on the following: Positioning    TIME IN/OUT:  Time In: 0725  Time Out: 0800  Minutes: 35    Rabia Haines, PT

## 2025-05-24 NOTE — PROGRESS NOTES
ACUTE OCCUPATIONAL THERAPY GOALS:   (Developed with and agreed upon by patient and/or caregiver.)  1. Patient will perform lower body dressing with min assist.  2. Patient will perform upper and lower body bathing with min assist.  3. Patient will perform toilet transfers with stand by assist.  4. Patient will participate in 30 + minutes of ADL/ therapeutic exercise/therapeutic activity with min rest breaks to increase activity tolerance for self care.  5. Patient will perform standing grooming tasks x5 mins with stand by assist.  6. Patient will perform ADL functional mobility in room with stand by assist.    Goals to be achieved in 7 days.     OCCUPATIONAL THERAPY Initial Assessment and Daily Note       OT Visit Days: 1  Acknowledge Orders  Time  OT Charge Capture  Rehab Caseload Tracker      Laurence Kaur is a 85 y.o. female   PRIMARY DIAGNOSIS: Acute stroke due to ischemia (HCC)  Acute stroke due to ischemia (HCC) [I63.9]       Reason for Referral: Generalized Muscle Weakness (M62.81)  Other lack of cordination (R27.8)  Inpatient: Payor: MEDICARE / Plan: MEDICARE PART A AND B / Product Type: *No Product type* /     ASSESSMENT:     REHAB RECOMMENDATIONS:   Recommendation to date pending progress:  Setting:  Post acute rehab     Equipment:    To Be Determined     ASSESSMENT:  Laurence Kaur is a 85 y.o. admitted for stroke like symptoms and a recent fall resulting in right knee pain. At time of evaluation MRI pending. All imaging on right knee was negative for fracture. She lives alone and reports prior level of function as independent with all ADLs, needs assistance with IADLs- has an aide 2x a week, and performs functional/community mobility with the use of an assistive device. Patient today complete functional transfer to bedside commode, toileting and lower body dressing. Based on OT evaluation completed this date, her current level of function is stand by assist-max assist for ADL tasks and

## 2025-05-24 NOTE — PROGRESS NOTES
ACUTE PHYSICAL THERAPY GOALS:   (Developed with and agreed upon by patient and/or caregiver.)  STG:  (1.)Ms. Kaur will move from supine to sit and sit to supine  with SUPERVISION within 3 treatment day(s).    (2.)Ms. Kaur will transfer from bed to chair and chair to bed with STAND BY ASSIST using the least restrictive device within 3 treatment day(s).    (3.)Ms. Kaur will ambulate with STAND BY ASSIST for 25 feet with the least restrictive device within 3 treatment day(s).     LTG:  (1.)Ms. Kaur will move from supine to sit and sit to supine  in bed with INDEPENDENT within 7 treatment day(s).    (2.)Ms. Kaur will transfer from bed to chair and chair to bed with SUPERVISION using the least restrictive device within 7 treatment day(s).    (3.)Ms. Kaur will ambulate with SUPERVISION for 50 feet with the least restrictive device within 7 treatment day(s).  ________________________________________________________________________________________________      PHYSICAL THERAPY Daily Note and PM  (Link to Caseload Tracking: PT Visit Days : 1  Acknowledge Orders  Time In/Out  PT Charge Capture  Rehab Caseload Tracker    Laurence Kaur is a 85 y.o. female   PRIMARY DIAGNOSIS: Acute stroke due to ischemia (HCC)  Acute stroke due to ischemia (HCC) [I63.9]       Reason for Referral: Generalized Muscle Weakness (M62.81)  Difficulty in walking, Not elsewhere classified (R26.2)  History of falling (Z91.81)  Inpatient: Payor: MEDICARE / Plan: MEDICARE PART A AND B / Product Type: *No Product type* /     ASSESSMENT:     REHAB RECOMMENDATIONS:   Recommendation to date pending progress:  Setting:  Continued physical therapy recommended at discharge.    Equipment:    None     ASSESSMENT:  Ms. Kaur admitted with above diagnosis.  She was brought to the ER with daughter with concerns for slurred speech and fall.  Patient had a fall at home when her knee gave out while standing at the bathroom sink.  Patient with a  +  Standing - Static: Fair  Standing - Dynamic: Fair   Posture [] Genu Valgus Right   Sensation []  neuropathy   Coordination [x]      Tone []     Edema []    Activity Tolerance [] Patient limited by pain    []      COGNITION/  PERCEPTION: Intact Impaired (Comments):   Orientation [x]     Vision [x]  glasses   Hearing [x]     Cognition  [x]       MOBILITY: I Mod I S SBA CGA Min Mod Max Total  NT x2 Comments:   Bed Mobility    Rolling [] [] [] [] [] [] [] [] [] [] []    Supine to Sit [] [] [] [x] [] [] [] [] [] [] []    Scooting [] [] [] [x] [] [] [] [] [] [] []    Sit to Supine [] [] [] [] [] [] [] [] [] [] []    Transfers    Sit to Stand [] [] [] [] [x] [] [] [] [] [] []    Bed to Chair [] [] [] [] [x] [] [] [] [] [] []    Stand to Sit [] [] [] [] [x] [] [] [] [] [] []    BSC [] [] [] [] [x] [] [] [] [] [] []    I=Independent, Mod I=Modified Independent, S=Supervision, SBA=Standby Assistance, CGA=Contact Guard Assistance,   Min=Minimal Assistance, Mod=Moderate Assistance, Max=Maximal Assistance, Total=Total Assistance, NT=Not Tested    GAIT: I Mod I S SBA CGA Min Mod Max Total  NT x2 Comments:   Level of Assistance [] [] [] [] [x] [] [] [] [] [] []    Distance 5 (x 2) feet    DME Gait Belt and Rolling Walker    Gait Quality Decreased doug , Decreased step clearance, Decreased step length, and Decreased stance    Weightbearing Status      Stairs      I=Independent, Mod I=Modified Independent, S=Supervision, SBA=Standby Assistance, CGA=Contact Guard Assistance,   Min=Minimal Assistance, Mod=Moderate Assistance, Max=Maximal Assistance, Total=Total Assistance, NT=Not Tested    PLAN:   FREQUENCY AND DURATION: Daily for duration of hospital stay or until stated goals are met, whichever comes first.    THERAPY PROGNOSIS: Good    PROBLEM LIST:   (Skilled intervention is medically necessary to address:)  Decreased ADL/Functional Activities  Decreased Activity Tolerance  Decreased AROM/PROM  Decreased Balance  Decreased

## 2025-05-24 NOTE — PROGRESS NOTES
GOALS:  LTG: Patient will maintain adequate hydration/nutrition with optimum safety and efficiency of swallowing function with PO intake without overt signs or symptoms of aspiration for the highest appropriate diet level.  STG:  Patient will consume easy to chew textures and thin liquids without overt signs or symptoms of airway compromise.    LTG: Patient will develop functional and intelligible speech and utilize compensatory strategies to improve communication across environments.  STG:  Patient will fill in carrier phrase/complete automatic speech tasks with 100% intelligibility given minimal cueing.  Patient will repeat words, phrases, sentences with 100% intelligibility given minimal cueing.  Patient will utilize compensatory strategies across tasks with 100% intelligibility given minimal cueing.    SPEECH LANGUAGE PATHOLOGY: DYSPHAGIA Initial Assessment    Acknowledge Order  I  Therapy Time  I   Charges     I  Rehab Caseload Tracker      NAME: Laurence Kaur  : 1940  MRN: [id number]    ADMISSION DATE: 2025  PRIMARY DIAGNOSIS: Acute stroke due to ischemia (HCC)    ICD-10: Treatment Diagnosis: R13.12 Dysphagia, Oropharyngeal Phase    RECOMMENDATIONS   Diet:    Easy to Chew  Thin Liquids    Medication: as tolerated   Compensatory Swallowing Strategies:   Slow rate of intake  Small bites/sips  Upright as possible for all oral intake   Therapeutic Intervention:   Patient/family education  Dysphagia treatment  Speech treatment   Patient continues to require skilled intervention:  Yes. Recommend ongoing speech therapy services during this hospitalization.     Anticipated Discharge Needs: Ongoing speech therapy is recommended at next level of care.      ASSESSMENT    Patient presents with an essentially normal oropharyngeal swallow function characterized by timely, distinct swallows of thin liquids and pureed solids with no overt signs of airway compromise. Patient reports having to avoid chewing on

## 2025-05-24 NOTE — PLAN OF CARE
Problem: Chronic Conditions and Co-morbidities  Goal: Patient's chronic conditions and co-morbidity symptoms are monitored and maintained or improved  5/24/2025 1218 by Maxine Valdovinos RN  Outcome: Progressing  5/24/2025 0017 by Selam Cornell RN  Outcome: Progressing     Problem: Discharge Planning  Goal: Discharge to home or other facility with appropriate resources  5/24/2025 1218 by Maxine Valdovinos RN  Outcome: Progressing  5/24/2025 0017 by Selam Cornell RN  Outcome: Progressing     Problem: Skin/Tissue Integrity  Goal: Skin integrity remains intact  Description: 1.  Monitor for areas of redness and/or skin breakdown2.  Assess vascular access sites hourly3.  Every 4-6 hours minimum:  Change oxygen saturation probe site4.  Every 4-6 hours:  If on nasal continuous positive airway pressure, respiratory therapy assess nares and determine need for appliance change or resting period  5/24/2025 1218 by Maxine Valdovinos RN  Outcome: Progressing  5/24/2025 0017 by Selam Cornell RN  Outcome: Progressing     Problem: ABCDS Injury Assessment  Goal: Absence of physical injury  5/24/2025 1218 by Maxine Valdovinos RN  Outcome: Progressing  5/24/2025 0017 by Selam Cornell RN  Outcome: Progressing     Problem: Safety - Adult  Goal: Free from fall injury  5/24/2025 1218 by Maxine Valdovinos RN  Outcome: Progressing  5/24/2025 0017 by Selam Cornell RN  Outcome: Progressing     Problem: Pain  Goal: Verbalizes/displays adequate comfort level or baseline comfort level  5/24/2025 1218 by Maxine Valdovinos RN  Outcome: Progressing  5/24/2025 0017 by Selam Cornell RN  Outcome: Progressing

## 2025-05-24 NOTE — CARE COORDINATION
CM note:    Chart reviewed for updates. CM met with pt at bedside, introduced role, confirmed demographics and discussed d/c planning. Pt lives with alone in a 1 level home with a ramped entrance. Pt was independent with ADL's at home unitl recently when she had a fall. Since the fall pt has been having diffucultly with ambulation and completing ADL's. She uses a walker at home to ambulate. Pt has a shower chair at home. She is on a CPAP machine at home that she uses at night. Pt has a care giver that comes to help her at home with ADL's and cleaning the house. The care giver is from [facility]. The care giver goes to the house twice a week for 4 hours. She is no longer an active  in the community. Her daughter or care giver provides transport. Pt has been to [facility] for rehab and has used [facility] for HH services in the past. She has a strong local family support system. Therapy is recommending STR. CM discussed STR with pt and daughters at bedside. They are all agreeable with pt going to STR. CM has provided them with a STR choice list and asked for at least 3 choices. CM to follow up with them tomorrow for choices. CM will continue to follow up with d/c planning.     05/24/25 4769   Service Assessment   Patient Orientation Alert and Oriented   Cognition Alert   History Provided By Patient   Primary Caregiver Self   Accompanied By/Relationship N/A   Support Systems Children   Patient's Healthcare Decision Maker is: Legal Next of Kin  (Daughters)   PCP Verified by CM Yes   Last Visit to PCP Within last 3 months   Prior Functional Level Assistance with the following:;Housework;Shopping;Cooking   Current Functional Level Assistance with the following:;Bathing;Dressing;Toileting;Mobility   Can patient return to prior living arrangement Yes   Ability to make needs known: Good   Family able to assist with home care needs: Yes   Would you like for me to discuss the discharge plan with

## 2025-05-25 PROCEDURE — 6360000002 HC RX W HCPCS: Performed by: STUDENT IN AN ORGANIZED HEALTH CARE EDUCATION/TRAINING PROGRAM

## 2025-05-25 PROCEDURE — 6370000000 HC RX 637 (ALT 250 FOR IP): Performed by: STUDENT IN AN ORGANIZED HEALTH CARE EDUCATION/TRAINING PROGRAM

## 2025-05-25 PROCEDURE — 94760 N-INVAS EAR/PLS OXIMETRY 1: CPT

## 2025-05-25 PROCEDURE — 6370000000 HC RX 637 (ALT 250 FOR IP): Performed by: INTERNAL MEDICINE

## 2025-05-25 PROCEDURE — 2500000003 HC RX 250 WO HCPCS: Performed by: STUDENT IN AN ORGANIZED HEALTH CARE EDUCATION/TRAINING PROGRAM

## 2025-05-25 PROCEDURE — 1100000000 HC RM PRIVATE

## 2025-05-25 RX ORDER — HYDROCODONE BITARTRATE AND ACETAMINOPHEN 5; 325 MG/1; MG/1
1 TABLET ORAL EVERY 6 HOURS PRN
Refills: 0 | Status: DISCONTINUED | OUTPATIENT
Start: 2025-05-25 | End: 2025-05-30 | Stop reason: SDUPTHER

## 2025-05-25 RX ORDER — KETOROLAC TROMETHAMINE 15 MG/ML
15 INJECTION, SOLUTION INTRAMUSCULAR; INTRAVENOUS EVERY 6 HOURS PRN
Status: DISCONTINUED | OUTPATIENT
Start: 2025-05-25 | End: 2025-05-26

## 2025-05-25 RX ORDER — AMLODIPINE BESYLATE 2.5 MG/1
2.5 TABLET ORAL DAILY
Status: DISCONTINUED | OUTPATIENT
Start: 2025-05-26 | End: 2025-05-30 | Stop reason: HOSPADM

## 2025-05-25 RX ORDER — AMLODIPINE BESYLATE 10 MG/1
10 TABLET ORAL DAILY
Status: DISCONTINUED | OUTPATIENT
Start: 2025-05-25 | End: 2025-05-25

## 2025-05-25 RX ORDER — LISINOPRIL 5 MG/1
10 TABLET ORAL DAILY
Status: DISCONTINUED | OUTPATIENT
Start: 2025-05-25 | End: 2025-05-25

## 2025-05-25 RX ORDER — LISINOPRIL 5 MG/1
10 TABLET ORAL DAILY
Status: DISCONTINUED | OUTPATIENT
Start: 2025-05-26 | End: 2025-05-30 | Stop reason: HOSPADM

## 2025-05-25 RX ADMIN — IPRATROPIUM BROMIDE 2 SPRAY: 21 SPRAY, METERED NASAL at 06:09

## 2025-05-25 RX ADMIN — ROSUVASTATIN CALCIUM 40 MG: 20 TABLET, FILM COATED ORAL at 17:01

## 2025-05-25 RX ADMIN — B-COMPLEX W/ C & FOLIC ACID TAB 1 TABLET: TAB at 08:29

## 2025-05-25 RX ADMIN — DESOXIMETASONE: 0.5 OINTMENT TOPICAL at 08:36

## 2025-05-25 RX ADMIN — PANTOPRAZOLE SODIUM 40 MG: 40 TABLET, DELAYED RELEASE ORAL at 06:08

## 2025-05-25 RX ADMIN — ALPRAZOLAM 0.5 MG: 0.5 TABLET ORAL at 20:27

## 2025-05-25 RX ADMIN — NYSTATIN 50000 UNITS: 100000 SUSPENSION ORAL at 20:29

## 2025-05-25 RX ADMIN — ATORVASTATIN CALCIUM 80 MG: 40 TABLET, FILM COATED ORAL at 20:22

## 2025-05-25 RX ADMIN — SODIUM CHLORIDE, PRESERVATIVE FREE 10 ML: 5 INJECTION INTRAVENOUS at 20:23

## 2025-05-25 RX ADMIN — LISINOPRIL 10 MG: 5 TABLET ORAL at 08:33

## 2025-05-25 RX ADMIN — IPRATROPIUM BROMIDE 2 SPRAY: 21 SPRAY, METERED NASAL at 17:02

## 2025-05-25 RX ADMIN — SODIUM CHLORIDE, PRESERVATIVE FREE 10 ML: 5 INJECTION INTRAVENOUS at 08:34

## 2025-05-25 RX ADMIN — ACETAMINOPHEN 650 MG: 325 TABLET, FILM COATED ORAL at 09:56

## 2025-05-25 RX ADMIN — METAXALONE 400 MG: 800 TABLET ORAL at 20:22

## 2025-05-25 RX ADMIN — MICONAZOLE NITRATE: 20 CREAM TOPICAL at 08:35

## 2025-05-25 RX ADMIN — CETIRIZINE HYDROCHLORIDE 5 MG: 5 TABLET ORAL at 08:31

## 2025-05-25 RX ADMIN — METAXALONE 400 MG: 800 TABLET ORAL at 08:29

## 2025-05-25 RX ADMIN — Medication 1 TABLET: at 08:33

## 2025-05-25 RX ADMIN — TRIAMCINOLONE ACETONIDE: 1 CREAM TOPICAL at 08:35

## 2025-05-25 RX ADMIN — AMLODIPINE BESYLATE 10 MG: 10 TABLET ORAL at 09:54

## 2025-05-25 RX ADMIN — GUAIFENESIN 600 MG: 600 TABLET, MULTILAYER, EXTENDED RELEASE ORAL at 08:29

## 2025-05-25 RX ADMIN — CLOPIDOGREL BISULFATE 75 MG: 75 TABLET, FILM COATED ORAL at 08:32

## 2025-05-25 RX ADMIN — NYSTATIN 50000 UNITS: 100000 SUSPENSION ORAL at 13:11

## 2025-05-25 RX ADMIN — SERTRALINE 25 MG: 50 TABLET, FILM COATED ORAL at 08:31

## 2025-05-25 RX ADMIN — METOPROLOL SUCCINATE 12.5 MG: 25 TABLET, EXTENDED RELEASE ORAL at 08:28

## 2025-05-25 RX ADMIN — BUMETANIDE 2 MG: 1 TABLET ORAL at 08:32

## 2025-05-25 RX ADMIN — NYSTATIN 50000 UNITS: 100000 SUSPENSION ORAL at 17:01

## 2025-05-25 RX ADMIN — TRIAMCINOLONE ACETONIDE: 1 CREAM TOPICAL at 20:22

## 2025-05-25 RX ADMIN — CELECOXIB 200 MG: 200 CAPSULE ORAL at 08:32

## 2025-05-25 RX ADMIN — CELECOXIB 200 MG: 200 CAPSULE ORAL at 20:22

## 2025-05-25 RX ADMIN — MICONAZOLE NITRATE: 20 CREAM TOPICAL at 20:22

## 2025-05-25 RX ADMIN — KETOROLAC TROMETHAMINE 15 MG: 15 INJECTION, SOLUTION INTRAMUSCULAR; INTRAVENOUS at 17:00

## 2025-05-25 RX ADMIN — GUAIFENESIN 600 MG: 600 TABLET, MULTILAYER, EXTENDED RELEASE ORAL at 20:22

## 2025-05-25 RX ADMIN — NYSTATIN 50000 UNITS: 100000 SUSPENSION ORAL at 08:33

## 2025-05-25 ASSESSMENT — PAIN DESCRIPTION - ORIENTATION
ORIENTATION: RIGHT
ORIENTATION: RIGHT
ORIENTATION: RIGHT;POSTERIOR
ORIENTATION: RIGHT

## 2025-05-25 ASSESSMENT — PAIN DESCRIPTION - LOCATION
LOCATION: KNEE
LOCATION: BACK;KNEE

## 2025-05-25 ASSESSMENT — PAIN SCALES - GENERAL
PAINLEVEL_OUTOF10: 7
PAINLEVEL_OUTOF10: 0
PAINLEVEL_OUTOF10: 9
PAINLEVEL_OUTOF10: 8
PAINLEVEL_OUTOF10: 9

## 2025-05-25 ASSESSMENT — PAIN DESCRIPTION - DESCRIPTORS: DESCRIPTORS: ACHING

## 2025-05-25 NOTE — PLAN OF CARE
Problem: Chronic Conditions and Co-morbidities  Goal: Patient's chronic conditions and co-morbidity symptoms are monitored and maintained or improved  5/24/2025 2140 by Selam Cornell RN  Outcome: Progressing  Flowsheets (Taken 5/24/2025 2049)  Care Plan - Patient's Chronic Conditions and Co-Morbidity Symptoms are Monitored and Maintained or Improved:   Monitor and assess patient's chronic conditions and comorbid symptoms for stability, deterioration, or improvement   Update acute care plan with appropriate goals if chronic or comorbid symptoms are exacerbated and prevent overall improvement and discharge  5/24/2025 1218 by Maxine Valdovinos RN  Outcome: Progressing     Problem: Discharge Planning  Goal: Discharge to home or other facility with appropriate resources  5/24/2025 2140 by Selam Cornell RN  Outcome: Progressing  Flowsheets (Taken 5/24/2025 2049)  Discharge to home or other facility with appropriate resources: Identify barriers to discharge with patient and caregiver  5/24/2025 1218 by Maxine Valdovinos RN  Outcome: Progressing     Problem: Skin/Tissue Integrity  Goal: Skin integrity remains intact  Description: 1.  Monitor for areas of redness and/or skin breakdown2.  Assess vascular access sites hourly3.  Every 4-6 hours minimum:  Change oxygen saturation probe site4.  Every 4-6 hours:  If on nasal continuous positive airway pressure, respiratory therapy assess nares and determine need for appliance change or resting period  5/24/2025 2140 by Selam Cornell RN  Outcome: Progressing  Flowsheets (Taken 5/24/2025 2049)  Skin Integrity Remains Intact:   Monitor for areas of redness and/or skin breakdown   Turn and reposition as indicated  5/24/2025 1218 by Maxine Valdovinos RN  Outcome: Progressing     Problem: ABCDS Injury Assessment  Goal: Absence of physical injury  5/24/2025 2140 by Selam Cornell RN  Outcome: Progressing  5/24/2025 1218 by Maxine Valdovinos

## 2025-05-25 NOTE — PROGRESS NOTES
% injection 5-40 mL  5-40 mL IntraVENous 2 times per day    sodium chloride flush 0.9 % injection 5-40 mL  5-40 mL IntraVENous PRN    0.9 % sodium chloride infusion   IntraVENous PRN    ondansetron (ZOFRAN-ODT) disintegrating tablet 4 mg  4 mg Oral Q8H PRN    Or    ondansetron (ZOFRAN) injection 4 mg  4 mg IntraVENous Q6H PRN    polyethylene glycol (GLYCOLAX) packet 17 g  17 g Oral Daily PRN    atorvastatin (LIPITOR) tablet 80 mg  80 mg Oral Nightly    clopidogrel (PLAVIX) tablet 75 mg  75 mg Oral Daily    ALPRAZolam (XANAX) tablet 0.5 mg  0.5 mg Oral Nightly PRN    bumetanide (BUMEX) tablet 2 mg  2 mg Oral Daily    oyster shell calcium w/D 500-5 MG-MCG tablet 1 tablet  1 tablet Oral Daily    carboxymethylcellulose (THERATEARS) 1 % ophthalmic gel 1 drop  1 drop Both Eyes TID    miconazole (MICOTIN) 2 % cream   Topical BID    desoximetasone (TOPICORT) 0.05 % ointment (Patient Supplied)   Topical Daily    cetirizine (ZYRTEC) tablet 5 mg  5 mg Oral Daily    guaiFENesin (MUCINEX) extended release tablet 600 mg  600 mg Oral BID    ipratropium (ATROVENT) 0.03 % nasal spray 2 spray  2 spray Each Nostril Q12H    metaxalone (SKELAXIN) tablet 400 mg  400 mg Oral BID    metoprolol succinate (TOPROL XL) extended release tablet 12.5 mg  12.5 mg Oral Daily    multivitamin 1 tablet  1 tablet Oral Daily    nystatin (MYCOSTATIN) 311753 UNIT/ML suspension 50,000 Units  0.5 mL Oral 4x Daily    pantoprazole (PROTONIX) tablet 40 mg  40 mg Oral QAM AC    rimegepant sulfate TBDP 75 mg (Patient Supplied)  75 mg Oral Every Other Day    rosuvastatin (CRESTOR) tablet 40 mg  40 mg Oral QPM    sertraline (ZOLOFT) tablet 25 mg  25 mg Oral Daily    triamcinolone (KENALOG) 0.1 % cream   Topical BID    celecoxib (CELEBREX) capsule 200 mg  200 mg Oral BID       Signed:  Ambreen Reed MD    Part of this note may have been written by using a voice dictation software.  The note has been proof read but may still contain some grammatical/other  typographical errors.

## 2025-05-25 NOTE — CARE COORDINATION
CM note:    Chart reviewed for updates. CM met with pt and family to get STR choices. They chose:    [facility]      Referral sent via [employer] pending review. Pt does not need auth. CM will continue to follow up with d/c planning.    SONIA Russo

## 2025-05-26 ENCOUNTER — APPOINTMENT (OUTPATIENT)
Dept: CT IMAGING | Age: 85
DRG: 064 | End: 2025-05-26
Attending: STUDENT IN AN ORGANIZED HEALTH CARE EDUCATION/TRAINING PROGRAM
Payer: MEDICARE

## 2025-05-26 LAB
ANION GAP SERPL CALC-SCNC: 16 MMOL/L (ref 7–16)
APPEARANCE UR: CLEAR
BACTERIA URNS QL MICRO: 0 /HPF
BASOPHILS # BLD: 0.04 K/UL (ref 0–0.2)
BASOPHILS NFR BLD: 0.5 % (ref 0–2)
BILIRUB UR QL: NEGATIVE
BUN SERPL-MCNC: 51 MG/DL (ref 8–23)
CALCIUM SERPL-MCNC: 9 MG/DL (ref 8.8–10.2)
CASTS URNS QL MICRO: 0 /LPF
CHLORIDE SERPL-SCNC: 100 MMOL/L (ref 98–107)
CO2 SERPL-SCNC: 23 MMOL/L (ref 20–29)
COLOR UR: NORMAL
CREAT SERPL-MCNC: 1.43 MG/DL (ref 0.6–1.1)
CRYSTALS URNS QL MICRO: 0 /LPF
DIFFERENTIAL METHOD BLD: NORMAL
EOSINOPHIL # BLD: 0.27 K/UL (ref 0–0.8)
EOSINOPHIL NFR BLD: 3 % (ref 0.5–7.8)
EPI CELLS #/AREA URNS HPF: NORMAL /HPF
ERYTHROCYTE [DISTWIDTH] IN BLOOD BY AUTOMATED COUNT: 14.3 % (ref 11.9–14.6)
GLUCOSE SERPL-MCNC: 120 MG/DL (ref 70–99)
GLUCOSE UR STRIP.AUTO-MCNC: NEGATIVE MG/DL
HCT VFR BLD AUTO: 37.5 % (ref 35.8–46.3)
HGB BLD-MCNC: 12.2 G/DL (ref 11.7–15.4)
HGB UR QL STRIP: NEGATIVE
IMM GRANULOCYTES # BLD AUTO: 0.03 K/UL (ref 0–0.5)
IMM GRANULOCYTES NFR BLD AUTO: 0.3 % (ref 0–5)
KETONES UR QL STRIP.AUTO: NEGATIVE MG/DL
LEUKOCYTE ESTERASE UR QL STRIP.AUTO: NEGATIVE
LYMPHOCYTES # BLD: 2.44 K/UL (ref 0.5–4.6)
LYMPHOCYTES NFR BLD: 27.5 % (ref 13–44)
MCH RBC QN AUTO: 29.3 PG (ref 26.1–32.9)
MCHC RBC AUTO-ENTMCNC: 32.5 G/DL (ref 31.4–35)
MCV RBC AUTO: 89.9 FL (ref 82–102)
MONOCYTES # BLD: 0.74 K/UL (ref 0.1–1.3)
MONOCYTES NFR BLD: 8.3 % (ref 4–12)
MUCOUS THREADS URNS QL MICRO: 0 /LPF
NEUTS SEG # BLD: 5.35 K/UL (ref 1.7–8.2)
NEUTS SEG NFR BLD: 60.4 % (ref 43–78)
NITRITE UR QL STRIP.AUTO: NEGATIVE
NRBC # BLD: 0 K/UL (ref 0–0.2)
PH UR STRIP: 5 (ref 5–9)
PHOSPHATE SERPL-MCNC: 5.9 MG/DL (ref 2.5–4.5)
PLATELET # BLD AUTO: 217 K/UL (ref 150–450)
PMV BLD AUTO: 11.4 FL (ref 9.4–12.3)
POTASSIUM SERPL-SCNC: 4 MMOL/L (ref 3.5–5.1)
PROT UR STRIP-MCNC: NEGATIVE MG/DL
RBC # BLD AUTO: 4.17 M/UL (ref 4.05–5.2)
RBC #/AREA URNS HPF: NORMAL /HPF
SODIUM SERPL-SCNC: 139 MMOL/L (ref 136–145)
SP GR UR REFRACTOMETRY: 1.01 (ref 1–1.02)
URINE CULTURE IF INDICATED: NORMAL
UROBILINOGEN UR QL STRIP.AUTO: 0.2 EU/DL (ref 0.2–1)
WBC # BLD AUTO: 8.9 K/UL (ref 4.3–11.1)
WBC URNS QL MICRO: NORMAL /HPF

## 2025-05-26 PROCEDURE — 1100000000 HC RM PRIVATE

## 2025-05-26 PROCEDURE — 80048 BASIC METABOLIC PNL TOTAL CA: CPT

## 2025-05-26 PROCEDURE — 81001 URINALYSIS AUTO W/SCOPE: CPT

## 2025-05-26 PROCEDURE — 85025 COMPLETE CBC W/AUTO DIFF WBC: CPT

## 2025-05-26 PROCEDURE — 6360000004 HC RX CONTRAST MEDICATION: Performed by: STUDENT IN AN ORGANIZED HEALTH CARE EDUCATION/TRAINING PROGRAM

## 2025-05-26 PROCEDURE — 36415 COLL VENOUS BLD VENIPUNCTURE: CPT

## 2025-05-26 PROCEDURE — 84100 ASSAY OF PHOSPHORUS: CPT

## 2025-05-26 PROCEDURE — 97530 THERAPEUTIC ACTIVITIES: CPT

## 2025-05-26 PROCEDURE — 70498 CT ANGIOGRAPHY NECK: CPT

## 2025-05-26 PROCEDURE — 6370000000 HC RX 637 (ALT 250 FOR IP): Performed by: INTERNAL MEDICINE

## 2025-05-26 PROCEDURE — 2500000003 HC RX 250 WO HCPCS: Performed by: STUDENT IN AN ORGANIZED HEALTH CARE EDUCATION/TRAINING PROGRAM

## 2025-05-26 PROCEDURE — 97535 SELF CARE MNGMENT TRAINING: CPT

## 2025-05-26 PROCEDURE — 6370000000 HC RX 637 (ALT 250 FOR IP): Performed by: STUDENT IN AN ORGANIZED HEALTH CARE EDUCATION/TRAINING PROGRAM

## 2025-05-26 RX ORDER — IOPAMIDOL 755 MG/ML
100 INJECTION, SOLUTION INTRAVASCULAR
Status: COMPLETED | OUTPATIENT
Start: 2025-05-26 | End: 2025-05-26

## 2025-05-26 RX ORDER — CELECOXIB 200 MG/1
200 CAPSULE ORAL DAILY
Status: DISCONTINUED | OUTPATIENT
Start: 2025-05-26 | End: 2025-05-30 | Stop reason: HOSPADM

## 2025-05-26 RX ORDER — TRAMADOL HYDROCHLORIDE 50 MG/1
50 TABLET ORAL EVERY 6 HOURS PRN
Status: DISCONTINUED | OUTPATIENT
Start: 2025-05-26 | End: 2025-05-30 | Stop reason: HOSPADM

## 2025-05-26 RX ORDER — ASPIRIN 81 MG/1
81 TABLET ORAL DAILY
Status: DISCONTINUED | OUTPATIENT
Start: 2025-05-26 | End: 2025-05-30 | Stop reason: HOSPADM

## 2025-05-26 RX ADMIN — MICONAZOLE NITRATE: 20 CREAM TOPICAL at 20:20

## 2025-05-26 RX ADMIN — CARBOXYMETHYLCELLULOSE SODIUM 1 DROP: 10 GEL OPHTHALMIC at 09:10

## 2025-05-26 RX ADMIN — ROSUVASTATIN CALCIUM 40 MG: 20 TABLET, FILM COATED ORAL at 18:21

## 2025-05-26 RX ADMIN — ASPIRIN 81 MG: 81 TABLET, COATED ORAL at 09:04

## 2025-05-26 RX ADMIN — CETIRIZINE HYDROCHLORIDE 5 MG: 5 TABLET ORAL at 09:03

## 2025-05-26 RX ADMIN — IPRATROPIUM BROMIDE 2 SPRAY: 21 SPRAY, METERED NASAL at 09:02

## 2025-05-26 RX ADMIN — TRIAMCINOLONE ACETONIDE: 1 CREAM TOPICAL at 20:20

## 2025-05-26 RX ADMIN — NYSTATIN 50000 UNITS: 100000 SUSPENSION ORAL at 18:21

## 2025-05-26 RX ADMIN — PANTOPRAZOLE SODIUM 40 MG: 40 TABLET, DELAYED RELEASE ORAL at 06:29

## 2025-05-26 RX ADMIN — IPRATROPIUM BROMIDE 2 SPRAY: 21 SPRAY, METERED NASAL at 18:21

## 2025-05-26 RX ADMIN — NYSTATIN 50000 UNITS: 100000 SUSPENSION ORAL at 09:06

## 2025-05-26 RX ADMIN — METAXALONE 400 MG: 800 TABLET ORAL at 09:06

## 2025-05-26 RX ADMIN — NYSTATIN 50000 UNITS: 100000 SUSPENSION ORAL at 14:51

## 2025-05-26 RX ADMIN — IOPAMIDOL 60 ML: 755 INJECTION, SOLUTION INTRAVENOUS at 13:33

## 2025-05-26 RX ADMIN — MICONAZOLE NITRATE: 20 CREAM TOPICAL at 09:08

## 2025-05-26 RX ADMIN — SERTRALINE 25 MG: 50 TABLET, FILM COATED ORAL at 09:06

## 2025-05-26 RX ADMIN — SODIUM CHLORIDE, PRESERVATIVE FREE 10 ML: 5 INJECTION INTRAVENOUS at 09:10

## 2025-05-26 RX ADMIN — AMLODIPINE BESYLATE 2.5 MG: 2.5 TABLET ORAL at 09:04

## 2025-05-26 RX ADMIN — BUMETANIDE 2 MG: 1 TABLET ORAL at 09:06

## 2025-05-26 RX ADMIN — GUAIFENESIN 600 MG: 600 TABLET, MULTILAYER, EXTENDED RELEASE ORAL at 20:14

## 2025-05-26 RX ADMIN — CARBOXYMETHYLCELLULOSE SODIUM 1 DROP: 10 GEL OPHTHALMIC at 14:51

## 2025-05-26 RX ADMIN — DESOXIMETASONE: 0.5 OINTMENT TOPICAL at 09:08

## 2025-05-26 RX ADMIN — ATORVASTATIN CALCIUM 80 MG: 40 TABLET, FILM COATED ORAL at 20:14

## 2025-05-26 RX ADMIN — METOPROLOL SUCCINATE 12.5 MG: 25 TABLET, EXTENDED RELEASE ORAL at 09:03

## 2025-05-26 RX ADMIN — METAXALONE 400 MG: 800 TABLET ORAL at 20:14

## 2025-05-26 RX ADMIN — ALPRAZOLAM 0.5 MG: 0.5 TABLET ORAL at 20:14

## 2025-05-26 RX ADMIN — GUAIFENESIN 600 MG: 600 TABLET, MULTILAYER, EXTENDED RELEASE ORAL at 09:03

## 2025-05-26 RX ADMIN — Medication 1 TABLET: at 09:06

## 2025-05-26 RX ADMIN — B-COMPLEX W/ C & FOLIC ACID TAB 1 TABLET: TAB at 09:04

## 2025-05-26 RX ADMIN — TRIAMCINOLONE ACETONIDE: 1 CREAM TOPICAL at 09:09

## 2025-05-26 RX ADMIN — CLOPIDOGREL BISULFATE 75 MG: 75 TABLET, FILM COATED ORAL at 09:04

## 2025-05-26 RX ADMIN — NYSTATIN 50000 UNITS: 100000 SUSPENSION ORAL at 20:14

## 2025-05-26 RX ADMIN — HYDROCODONE BITARTRATE AND ACETAMINOPHEN 1 TABLET: 5; 325 TABLET ORAL at 22:59

## 2025-05-26 RX ADMIN — CELECOXIB 200 MG: 200 CAPSULE ORAL at 11:02

## 2025-05-26 RX ADMIN — LISINOPRIL 10 MG: 5 TABLET ORAL at 09:05

## 2025-05-26 RX ADMIN — SODIUM CHLORIDE, PRESERVATIVE FREE 10 ML: 5 INJECTION INTRAVENOUS at 20:19

## 2025-05-26 ASSESSMENT — PAIN SCALES - GENERAL
PAINLEVEL_OUTOF10: 1
PAINLEVEL_OUTOF10: 6
PAINLEVEL_OUTOF10: 3

## 2025-05-26 ASSESSMENT — PAIN DESCRIPTION - LOCATION
LOCATION: BACK
LOCATION: KNEE;LEG

## 2025-05-26 ASSESSMENT — PAIN DESCRIPTION - ORIENTATION: ORIENTATION: RIGHT

## 2025-05-26 NOTE — PROGRESS NOTES
Urine Negative NEG      Leukocyte Esterase, Urine Negative NEG      WBC, UA 0-3 0 /hpf    RBC, UA 0-3 0 /hpf    BACTERIA, URINE 0 0 /hpf    Urine Culture if Indicated CULTURE NOT INDICATED BY UA RESULT      Epithelial Cells, UA 0-3 0 /hpf    Casts 0 0 /lpf    Crystals 0 0 /LPF    Mucus, UA 0 0 /lpf       No results for input(s): \"COVID19\" in the last 72 hours.    Current Meds:  Current Facility-Administered Medications   Medication Dose Route Frequency    aspirin EC tablet 81 mg  81 mg Oral Daily    celecoxib (CELEBREX) capsule 200 mg  200 mg Oral Daily    iopamidol (ISOVUE-370) 76 % injection 100 mL  100 mL IntraVENous ONCE PRN    amLODIPine (NORVASC) tablet 2.5 mg  2.5 mg Oral Daily    And    lisinopril (PRINIVIL;ZESTRIL) tablet 10 mg  10 mg Oral Daily    HYDROcodone-acetaminophen (NORCO) 5-325 MG per tablet 1 tablet  1 tablet Oral Q6H PRN    acetaminophen (TYLENOL) tablet 650 mg  650 mg Oral Q6H PRN    albuterol (PROVENTIL) (2.5 MG/3ML) 0.083% nebulizer solution 2.5 mg  2.5 mg Nebulization Q4H PRN    arformoterol 15 mcg-budesonide 0.25 mg neb solution   Nebulization PRN    ipratropium (ATROVENT) 0.02 % nebulizer solution 0.5 mg  0.5 mg Nebulization PRN    sodium chloride flush 0.9 % injection 5-40 mL  5-40 mL IntraVENous 2 times per day    sodium chloride flush 0.9 % injection 5-40 mL  5-40 mL IntraVENous PRN    0.9 % sodium chloride infusion   IntraVENous PRN    ondansetron (ZOFRAN-ODT) disintegrating tablet 4 mg  4 mg Oral Q8H PRN    Or    ondansetron (ZOFRAN) injection 4 mg  4 mg IntraVENous Q6H PRN    polyethylene glycol (GLYCOLAX) packet 17 g  17 g Oral Daily PRN    atorvastatin (LIPITOR) tablet 80 mg  80 mg Oral Nightly    clopidogrel (PLAVIX) tablet 75 mg  75 mg Oral Daily    ALPRAZolam (XANAX) tablet 0.5 mg  0.5 mg Oral Nightly PRN    bumetanide (BUMEX) tablet 2 mg  2 mg Oral Daily    oyster shell calcium w/D 500-5 MG-MCG tablet 1 tablet  1 tablet Oral Daily    carboxymethylcellulose (THERATEARS) 1 %  ophthalmic gel 1 drop  1 drop Both Eyes TID    miconazole (MICOTIN) 2 % cream   Topical BID    desoximetasone (TOPICORT) 0.05 % ointment (Patient Supplied)   Topical Daily    cetirizine (ZYRTEC) tablet 5 mg  5 mg Oral Daily    guaiFENesin (MUCINEX) extended release tablet 600 mg  600 mg Oral BID    ipratropium (ATROVENT) 0.03 % nasal spray 2 spray  2 spray Each Nostril Q12H    metaxalone (SKELAXIN) tablet 400 mg  400 mg Oral BID    metoprolol succinate (TOPROL XL) extended release tablet 12.5 mg  12.5 mg Oral Daily    multivitamin 1 tablet  1 tablet Oral Daily    nystatin (MYCOSTATIN) 091998 UNIT/ML suspension 50,000 Units  0.5 mL Oral 4x Daily    pantoprazole (PROTONIX) tablet 40 mg  40 mg Oral QAM AC    rimegepant sulfate TBDP 75 mg (Patient Supplied)  75 mg Oral Every Other Day    rosuvastatin (CRESTOR) tablet 40 mg  40 mg Oral QPM    sertraline (ZOLOFT) tablet 25 mg  25 mg Oral Daily    triamcinolone (KENALOG) 0.1 % cream   Topical BID       Signed:  Asia Nazario DO    Part of this note may have been written by using a voice dictation software.  The note has been proof read but may still contain some grammatical/other typographical errors.

## 2025-05-26 NOTE — PROGRESS NOTES
85-year-old female with acute ischemic stroke in the left subcortical periventricular region.      Additionally seen on MRI, there is scattered T2 hyperintensities probably consistent with chronic microvascular disease.  There is a remote infarct involving the right primary motor cortex with surrounding encephalomalacia.  There are two punctate microhemorrhages in the right posterior cortical region.     There is moderate left atrial dilation seen on TTE.     Plan:  Okay to continue with Plavix for 21 days, aspirin indefinitely.  Continue statin at prior home dose, LDL is at goal.    Screen for occult atrial fibrillation.  I recommend discharge with 30-day cardiac event monitor, followed by prolonged monitoring with Loop recorder if necessary.     Optimize vascular risk factors and  on adherence to AHA's Life's Essential 8.    Pending completion of workup with CTA head and neck.  Will review results when available.  Otherwise plan for follow-up in neurology clinic after discharge, these orders have been placed.  Final disposition per PT/OT.        Valdemar Hoyt, DO  Neurology

## 2025-05-26 NOTE — CARE COORDINATION
CM spoke with patient's daughter by phone regarding bed offers to [facility]. Daughter inquired if room would be a private room at [facility]. CM reached out to trupti Rice at [facility], and facility will have a bed available for patient tomorrow and it will be a private room. Relayed this information to daughter and daughter has accepted bed offer. [facility] if medically [facility]. Rapid COVID order in preparation for transition to STR as this facility requires within 72hrs of admission. CM will continue to follow and assist with transition to STR at discharge along with any further needs that may arise.

## 2025-05-26 NOTE — PROGRESS NOTES
ACUTE PHYSICAL THERAPY GOALS:   (Developed with and agreed upon by patient and/or caregiver.)  STG:  (1.)Ms. Kaur will move from supine to sit and sit to supine  with SUPERVISION within 3 treatment day(s).    (2.)Ms. Kaur will transfer from bed to chair and chair to bed with STAND BY ASSIST using the least restrictive device within 3 treatment day(s).    (3.)Ms. Kaur will ambulate with STAND BY ASSIST for 25 feet with the least restrictive device within 3 treatment day(s).     LTG:  (1.)Ms. Kaur will move from supine to sit and sit to supine  in bed with INDEPENDENT within 7 treatment day(s).    (2.)Ms. Kaur will transfer from bed to chair and chair to bed with SUPERVISION using the least restrictive device within 7 treatment day(s).    (3.)Ms. Kaur will ambulate with SUPERVISION for 50 feet with the least restrictive device within 7 treatment day(s).  ________________________________________________________________________________________________      PHYSICAL THERAPY Daily Note and PM  (Link to Caseload Tracking: PT Visit Days : 2  Acknowledge Orders  Time In/Out  PT Charge Capture  Rehab Caseload Tracker    Laurence Kaur is a 85 y.o. female   PRIMARY DIAGNOSIS: Acute stroke due to ischemia (HCC)  Acute stroke due to ischemia (HCC) [I63.9]       Reason for Referral: Generalized Muscle Weakness (M62.81)  Difficulty in walking, Not elsewhere classified (R26.2)  History of falling (Z91.81)  Inpatient: Payor: MEDICARE / Plan: MEDICARE PART A AND B / Product Type: *No Product type* /     ASSESSMENT:     REHAB RECOMMENDATIONS:   Recommendation to date pending progress:  Setting:  Continued physical therapy recommended at discharge.    Equipment:    None     ASSESSMENT:  Ms. Kaur admitted with above diagnosis.  She was brought to the ER with daughter with concerns for slurred speech and fall.  Patient had a fall at home when her knee gave out while standing at the bathroom sink.  Patient with a

## 2025-05-26 NOTE — PROGRESS NOTES
ACUTE OCCUPATIONAL THERAPY GOALS:   (Developed with and agreed upon by patient and/or caregiver.)  1. Patient will perform lower body dressing with min assist.  2. Patient will perform upper and lower body bathing with min assist.  3. Patient will perform toilet transfers with stand by assist.  4. Patient will participate in 30 + minutes of ADL/ therapeutic exercise/therapeutic activity with min rest breaks to increase activity tolerance for self care.  5. Patient will perform standing grooming tasks x5 mins with stand by assist.  6. Patient will perform ADL functional mobility in room with stand by assist.    Goals to be achieved in 7 days.     OCCUPATIONAL THERAPY Daily Note and AM       OT Visit Days: 2  Acknowledge Orders  Time  OT Charge Capture  Rehab Caseload Tracker      Laurence Kaur is a 85 y.o. female   PRIMARY DIAGNOSIS: Acute stroke due to ischemia (HCC)  Acute stroke due to ischemia (HCC) [I63.9]       Reason for Referral: Generalized Muscle Weakness (M62.81)  Other lack of cordination (R27.8)  Inpatient: Payor: MEDICARE / Plan: MEDICARE PART A AND B / Product Type: *No Product type* /     ASSESSMENT:     REHAB RECOMMENDATIONS:   Recommendation to date pending progress:  Setting:  Post acute rehab     Equipment:    To Be Determined     ASSESSMENT:  Laurence Kaur upon arrival reclined in bed. Patient needed min assist for supine to sit edge of bed. Patient then stood with mod assist with rolling walker and completed stand pivot transfer to bedside commode with min assist with extended time to complete, she needs cues for sequencing and at times had difficulty advancing right foot. Patient needed max assist for toileting. She then completed pivot transfer with rolling walker to chair with min assist. Patient set up for lunch in recliner. Patient reports she is very fatigued from transfer to commode. Patient will continue to benefit from skilled OT services to maximize independence and  safety with ADL tasks.        [facility]™ “6 Clicks” Daily Activity Inpatient Short Form:    AM-PAC Daily Activity - Inpatient   How much help is needed for putting on and taking off regular lower body clothing?: A Lot  How much help is needed for bathing (which includes washing, rinsing, drying)?: A Little  How much help is needed for toileting (which includes using toilet, bedpan, or urinal)?: A Lot  How much help is needed for putting on and taking off regular upper body clothing?: A Little  How much help is needed for taking care of personal grooming?: A Little  How much help for eating meals?: None  AM-[facility] Inpatient Daily Activity Raw Score: 17  AM-PAC Inpatient ADL T-Scale Score : 37.26  ADL Inpatient CMS 0-100% Score: 50.11  ADL Inpatient CMS G-Code Modifier : CK           SUBJECTIVE:     Ms. Kaur states, \"I need to go to the bathroom.\"     Social/Functional Lives With: Alone  Type of Home: House  Home Layout: One level  Home Access: Ramped entrance  Bathroom Shower/Tub: Walk-in shower  Bathroom Equipment: Shower chair  Home Equipment: Wheelchair - Manual, Rollator  Prior Level of Assist for ADLs: Independent  Active : Yes  Occupation: Retired    OBJECTIVE:     LINES / DRAINS / AIRWAY: None    RESTRICTIONS/PRECAUTIONS:       PAIN: VITALS / O2:   Pre Treatment: reports pain in R LE          Post Treatment: same, positioned in chair for comfort        Vitals          Oxygen   Room air          GROSS EVALUATION: INTACT IMPAIRED   (See Comments)   UE AROM [x] []Functional    UE PROM [] []   Strength []  Generalized weakness      Posture / Balance []  Good/fair+ sitting balance  Fair standing balance    Sensation [x]     Coordination []  decreased     Tone []       Edema []    Activity Tolerance []  Limited due to pain and fatigue      Hand Dominance R [] L []      COGNITION/  PERCEPTION: INTACT IMPAIRED   (See Comments)   Orientation [x]     Vision [x]     Hearing [x]     Cognition  [x]

## 2025-05-26 NOTE — PLAN OF CARE
Problem: Chronic Conditions and Co-morbidities  Goal: Patient's chronic conditions and co-morbidity symptoms are monitored and maintained or improved  5/25/2025 2107 by Selam Cornell RN  Outcome: Progressing  Flowsheets (Taken 5/25/2025 2050)  Care Plan - Patient's Chronic Conditions and Co-Morbidity Symptoms are Monitored and Maintained or Improved: Update acute care plan with appropriate goals if chronic or comorbid symptoms are exacerbated and prevent overall improvement and discharge  5/25/2025 1219 by Maxine Valdovinos RN  Outcome: Progressing     Problem: Discharge Planning  Goal: Discharge to home or other facility with appropriate resources  5/25/2025 2107 by Selam Cornell RN  Outcome: Progressing  Flowsheets (Taken 5/25/2025 2050)  Discharge to home or other facility with appropriate resources:   Arrange for needed discharge resources and transportation as appropriate   Identify barriers to discharge with patient and caregiver  5/25/2025 1219 by Maxine Valdovinos RN  Outcome: Progressing     Problem: Skin/Tissue Integrity  Goal: Skin integrity remains intact  Description: 1.  Monitor for areas of redness and/or skin breakdown2.  Assess vascular access sites hourly3.  Every 4-6 hours minimum:  Change oxygen saturation probe site4.  Every 4-6 hours:  If on nasal continuous positive airway pressure, respiratory therapy assess nares and determine need for appliance change or resting period  5/25/2025 2107 by Selam Cornell RN  Outcome: Progressing  Flowsheets (Taken 5/25/2025 2050)  Skin Integrity Remains Intact:   Monitor for areas of redness and/or skin breakdown   Turn and reposition as indicated   Pressure redistribution bed/mattress (bed type)  5/25/2025 1219 by Maxine Valdovinos RN  Outcome: Progressing     Problem: ABCDS Injury Assessment  Goal: Absence of physical injury  5/25/2025 2107 by Selam Cornell RN  Outcome: Progressing  5/25/2025 1219 by Gregorio Leonard  PATTIE Goodman  Outcome: Progressing     Problem: Safety - Adult  Goal: Free from fall injury  5/25/2025 2107 by Selam Cornell RN  Outcome: Progressing  5/25/2025 1219 by Maxine Valdovinos RN  Outcome: Progressing     Problem: Pain  Goal: Verbalizes/displays adequate comfort level or baseline comfort level  5/25/2025 2107 by Selam Cornell RN  Outcome: Progressing  5/25/2025 1219 by Maxine Valdovinos, RN  Outcome: Progressing

## 2025-05-27 PROBLEM — N17.9 AKI (ACUTE KIDNEY INJURY): Status: ACTIVE | Noted: 2025-05-27

## 2025-05-27 LAB
ANION GAP SERPL CALC-SCNC: 13 MMOL/L (ref 7–16)
ANION GAP SERPL CALC-SCNC: 15 MMOL/L (ref 7–16)
BASOPHILS # BLD: 0.06 K/UL (ref 0–0.2)
BASOPHILS NFR BLD: 0.7 % (ref 0–2)
BUN SERPL-MCNC: 56 MG/DL (ref 8–23)
BUN SERPL-MCNC: 59 MG/DL (ref 8–23)
CALCIUM SERPL-MCNC: 9 MG/DL (ref 8.8–10.2)
CALCIUM SERPL-MCNC: 9.1 MG/DL (ref 8.8–10.2)
CHLORIDE SERPL-SCNC: 97 MMOL/L (ref 98–107)
CHLORIDE SERPL-SCNC: 98 MMOL/L (ref 98–107)
CO2 SERPL-SCNC: 22 MMOL/L (ref 20–29)
CO2 SERPL-SCNC: 24 MMOL/L (ref 20–29)
CREAT SERPL-MCNC: 1.56 MG/DL (ref 0.6–1.1)
CREAT SERPL-MCNC: 1.61 MG/DL (ref 0.6–1.1)
DIFFERENTIAL METHOD BLD: NORMAL
EOSINOPHIL # BLD: 0.26 K/UL (ref 0–0.8)
EOSINOPHIL NFR BLD: 2.9 % (ref 0.5–7.8)
ERYTHROCYTE [DISTWIDTH] IN BLOOD BY AUTOMATED COUNT: 14.3 % (ref 11.9–14.6)
GLUCOSE SERPL-MCNC: 113 MG/DL (ref 70–99)
GLUCOSE SERPL-MCNC: 139 MG/DL (ref 70–99)
HCT VFR BLD AUTO: 38.5 % (ref 35.8–46.3)
HGB BLD-MCNC: 12.5 G/DL (ref 11.7–15.4)
IMM GRANULOCYTES # BLD AUTO: 0.03 K/UL (ref 0–0.5)
IMM GRANULOCYTES NFR BLD AUTO: 0.3 % (ref 0–5)
LYMPHOCYTES # BLD: 2.82 K/UL (ref 0.5–4.6)
LYMPHOCYTES NFR BLD: 31.5 % (ref 13–44)
MCH RBC QN AUTO: 29.6 PG (ref 26.1–32.9)
MCHC RBC AUTO-ENTMCNC: 32.5 G/DL (ref 31.4–35)
MCV RBC AUTO: 91 FL (ref 82–102)
MONOCYTES # BLD: 0.75 K/UL (ref 0.1–1.3)
MONOCYTES NFR BLD: 8.4 % (ref 4–12)
NEUTS SEG # BLD: 5.03 K/UL (ref 1.7–8.2)
NEUTS SEG NFR BLD: 56.2 % (ref 43–78)
NRBC # BLD: 0 K/UL (ref 0–0.2)
PHOSPHATE SERPL-MCNC: 5.3 MG/DL (ref 2.5–4.5)
PLATELET # BLD AUTO: 218 K/UL (ref 150–450)
PMV BLD AUTO: 11.6 FL (ref 9.4–12.3)
POTASSIUM SERPL-SCNC: 3.6 MMOL/L (ref 3.5–5.1)
POTASSIUM SERPL-SCNC: 4.1 MMOL/L (ref 3.5–5.1)
RBC # BLD AUTO: 4.23 M/UL (ref 4.05–5.2)
SARS-COV-2 RDRP RESP QL NAA+PROBE: NOT DETECTED
SODIUM SERPL-SCNC: 134 MMOL/L (ref 136–145)
SODIUM SERPL-SCNC: 135 MMOL/L (ref 136–145)
SOURCE: NORMAL
WBC # BLD AUTO: 9 K/UL (ref 4.3–11.1)

## 2025-05-27 PROCEDURE — 97535 SELF CARE MNGMENT TRAINING: CPT

## 2025-05-27 PROCEDURE — 97530 THERAPEUTIC ACTIVITIES: CPT

## 2025-05-27 PROCEDURE — 2500000003 HC RX 250 WO HCPCS: Performed by: STUDENT IN AN ORGANIZED HEALTH CARE EDUCATION/TRAINING PROGRAM

## 2025-05-27 PROCEDURE — 87635 SARS-COV-2 COVID-19 AMP PRB: CPT

## 2025-05-27 PROCEDURE — 80048 BASIC METABOLIC PNL TOTAL CA: CPT

## 2025-05-27 PROCEDURE — 6370000000 HC RX 637 (ALT 250 FOR IP): Performed by: STUDENT IN AN ORGANIZED HEALTH CARE EDUCATION/TRAINING PROGRAM

## 2025-05-27 PROCEDURE — 36415 COLL VENOUS BLD VENIPUNCTURE: CPT

## 2025-05-27 PROCEDURE — 94760 N-INVAS EAR/PLS OXIMETRY 1: CPT

## 2025-05-27 PROCEDURE — 85025 COMPLETE CBC W/AUTO DIFF WBC: CPT

## 2025-05-27 PROCEDURE — 84100 ASSAY OF PHOSPHORUS: CPT

## 2025-05-27 PROCEDURE — 6370000000 HC RX 637 (ALT 250 FOR IP): Performed by: INTERNAL MEDICINE

## 2025-05-27 PROCEDURE — 2580000003 HC RX 258: Performed by: FAMILY MEDICINE

## 2025-05-27 PROCEDURE — 1100000000 HC RM PRIVATE

## 2025-05-27 RX ORDER — SODIUM CHLORIDE 9 MG/ML
INJECTION, SOLUTION INTRAVENOUS CONTINUOUS
Status: ACTIVE | OUTPATIENT
Start: 2025-05-27 | End: 2025-05-27

## 2025-05-27 RX ADMIN — ATORVASTATIN CALCIUM 80 MG: 40 TABLET, FILM COATED ORAL at 20:44

## 2025-05-27 RX ADMIN — AMLODIPINE BESYLATE 2.5 MG: 2.5 TABLET ORAL at 09:08

## 2025-05-27 RX ADMIN — CARBOXYMETHYLCELLULOSE SODIUM 1 DROP: 10 GEL OPHTHALMIC at 09:09

## 2025-05-27 RX ADMIN — NYSTATIN 50000 UNITS: 100000 SUSPENSION ORAL at 20:45

## 2025-05-27 RX ADMIN — CELECOXIB 200 MG: 200 CAPSULE ORAL at 09:08

## 2025-05-27 RX ADMIN — GUAIFENESIN 600 MG: 600 TABLET, MULTILAYER, EXTENDED RELEASE ORAL at 20:44

## 2025-05-27 RX ADMIN — LISINOPRIL 10 MG: 5 TABLET ORAL at 09:08

## 2025-05-27 RX ADMIN — NYSTATIN 50000 UNITS: 100000 SUSPENSION ORAL at 17:08

## 2025-05-27 RX ADMIN — BUMETANIDE 2 MG: 1 TABLET ORAL at 09:08

## 2025-05-27 RX ADMIN — MICONAZOLE NITRATE: 20 CREAM TOPICAL at 09:10

## 2025-05-27 RX ADMIN — TRIAMCINOLONE ACETONIDE: 1 CREAM TOPICAL at 20:46

## 2025-05-27 RX ADMIN — TRIAMCINOLONE ACETONIDE: 1 CREAM TOPICAL at 09:10

## 2025-05-27 RX ADMIN — METAXALONE 400 MG: 800 TABLET ORAL at 20:44

## 2025-05-27 RX ADMIN — HYDROCODONE BITARTRATE AND ACETAMINOPHEN 1 TABLET: 5; 325 TABLET ORAL at 20:44

## 2025-05-27 RX ADMIN — B-COMPLEX W/ C & FOLIC ACID TAB 1 TABLET: TAB at 09:08

## 2025-05-27 RX ADMIN — GUAIFENESIN 600 MG: 600 TABLET, MULTILAYER, EXTENDED RELEASE ORAL at 09:08

## 2025-05-27 RX ADMIN — NYSTATIN 50000 UNITS: 100000 SUSPENSION ORAL at 09:08

## 2025-05-27 RX ADMIN — NYSTATIN 50000 UNITS: 100000 SUSPENSION ORAL at 13:05

## 2025-05-27 RX ADMIN — IPRATROPIUM BROMIDE 2 SPRAY: 21 SPRAY, METERED NASAL at 18:16

## 2025-05-27 RX ADMIN — SODIUM CHLORIDE: 0.9 INJECTION, SOLUTION INTRAVENOUS at 09:47

## 2025-05-27 RX ADMIN — ROSUVASTATIN CALCIUM 40 MG: 20 TABLET, FILM COATED ORAL at 17:08

## 2025-05-27 RX ADMIN — CLOPIDOGREL BISULFATE 75 MG: 75 TABLET, FILM COATED ORAL at 09:08

## 2025-05-27 RX ADMIN — METOPROLOL SUCCINATE 12.5 MG: 25 TABLET, EXTENDED RELEASE ORAL at 09:08

## 2025-05-27 RX ADMIN — SODIUM CHLORIDE, PRESERVATIVE FREE 10 ML: 5 INJECTION INTRAVENOUS at 09:11

## 2025-05-27 RX ADMIN — HYDROCODONE BITARTRATE AND ACETAMINOPHEN 1 TABLET: 5; 325 TABLET ORAL at 09:35

## 2025-05-27 RX ADMIN — CARBOXYMETHYLCELLULOSE SODIUM 1 DROP: 10 GEL OPHTHALMIC at 20:46

## 2025-05-27 RX ADMIN — METAXALONE 400 MG: 800 TABLET ORAL at 09:08

## 2025-05-27 RX ADMIN — MICONAZOLE NITRATE: 20 CREAM TOPICAL at 20:46

## 2025-05-27 RX ADMIN — Medication 1 TABLET: at 09:08

## 2025-05-27 RX ADMIN — SERTRALINE 25 MG: 50 TABLET, FILM COATED ORAL at 09:11

## 2025-05-27 RX ADMIN — PANTOPRAZOLE SODIUM 40 MG: 40 TABLET, DELAYED RELEASE ORAL at 06:28

## 2025-05-27 RX ADMIN — CETIRIZINE HYDROCHLORIDE 5 MG: 5 TABLET ORAL at 09:08

## 2025-05-27 RX ADMIN — ASPIRIN 81 MG: 81 TABLET, COATED ORAL at 09:08

## 2025-05-27 RX ADMIN — CARBOXYMETHYLCELLULOSE SODIUM 1 DROP: 10 GEL OPHTHALMIC at 13:06

## 2025-05-27 ASSESSMENT — PAIN DESCRIPTION - DESCRIPTORS
DESCRIPTORS: CRAMPING
DESCRIPTORS: SORE

## 2025-05-27 ASSESSMENT — PAIN DESCRIPTION - LOCATION
LOCATION: ANKLE
LOCATION: ANKLE;LEG
LOCATION: ANKLE

## 2025-05-27 ASSESSMENT — PAIN SCALES - GENERAL
PAINLEVEL_OUTOF10: 2
PAINLEVEL_OUTOF10: 7
PAINLEVEL_OUTOF10: 4
PAINLEVEL_OUTOF10: 8

## 2025-05-27 ASSESSMENT — PAIN DESCRIPTION - ORIENTATION
ORIENTATION: RIGHT
ORIENTATION: LOWER;RIGHT

## 2025-05-27 ASSESSMENT — PAIN DESCRIPTION - PAIN TYPE: TYPE: ACUTE PAIN

## 2025-05-27 NOTE — PROGRESS NOTES
ACUTE OCCUPATIONAL THERAPY GOALS:   (Developed with and agreed upon by patient and/or caregiver.)  1. Patient will perform lower body dressing with min assist.  2. Patient will perform upper and lower body bathing with min assist.  3. Patient will perform toilet transfers with stand by assist.  4. Patient will participate in 30 + minutes of ADL/ therapeutic exercise/therapeutic activity with min rest breaks to increase activity tolerance for self care.  5. Patient will perform standing grooming tasks x5 mins with stand by assist.  6. Patient will perform ADL functional mobility in room with stand by assist.    Goals to be achieved in 7 days.     OCCUPATIONAL THERAPY Daily Note and PM       OT Visit Days: 3  Acknowledge Orders  Time  OT Charge Capture  Rehab Caseload Tracker      Laurence Kaur is a 85 y.o. female   PRIMARY DIAGNOSIS: Acute stroke due to ischemia (HCC)  Acute stroke due to ischemia (HCC) [I63.9]       Reason for Referral: Generalized Muscle Weakness (M62.81)  Other lack of cordination (R27.8)  Inpatient: Payor: MEDICARE / Plan: MEDICARE PART A AND B / Product Type: *No Product type* /     ASSESSMENT:     REHAB RECOMMENDATIONS:   Recommendation to date pending progress:  Setting:  Post acute rehab     Equipment:    To Be Determined     ASSESSMENT:  Laurence Kaur upon arrival reclined in bed. Patient needed min assist for supine to sit edge of bed. Patient then stood with min assist with rolling walker and completed stand pivot transfer to bedside chair with min assist with extended time to complete, she needs cues for sequencing. Patient then washed face with stand by assist. Patient very fatigued today, attempted B UE exercises yet patient too tired to complete, spO2 94% on room air.  Patient will continue to benefit from skilled OT services to maximize independence and safety with ADL tasks.        [facility] AM-PAC™ “6 Clicks” Daily Activity Inpatient Short Form:    AM-PAC

## 2025-05-27 NOTE — PROGRESS NOTES
ACUTE PHYSICAL THERAPY GOALS:   (Developed with and agreed upon by patient and/or caregiver.)  STG:  (1.)Ms. Kaur will move from supine to sit and sit to supine  with SUPERVISION within 3 treatment day(s).    (2.)Ms. Kaur will transfer from bed to chair and chair to bed with STAND BY ASSIST using the least restrictive device within 3 treatment day(s).    (3.)Ms. Kaur will ambulate with STAND BY ASSIST for 25 feet with the least restrictive device within 3 treatment day(s).     LTG:  (1.)Ms. Kaur will move from supine to sit and sit to supine  in bed with INDEPENDENT within 7 treatment day(s).    (2.)Ms. Kaur will transfer from bed to chair and chair to bed with SUPERVISION using the least restrictive device within 7 treatment day(s).    (3.)Ms. Kaur will ambulate with SUPERVISION for 50 feet with the least restrictive device within 7 treatment day(s).  ________________________________________________________________________________________________    PHYSICAL THERAPY Daily Note and PM  (Link to Caseload Tracking: PT Visit Days : 3  Acknowledge Orders  Time In/Out  PT Charge Capture  Rehab Caseload Tracker    Laurence Kaur is a 85 y.o. female   PRIMARY DIAGNOSIS: Acute stroke due to ischemia (HCC)  Acute stroke due to ischemia (HCC) [I63.9]       Reason for Referral: Generalized Muscle Weakness (M62.81)  Difficulty in walking, Not elsewhere classified (R26.2)  History of falling (Z91.81)  Inpatient: Payor: MEDICARE / Plan: MEDICARE PART A AND B / Product Type: *No Product type* /     ASSESSMENT:     REHAB RECOMMENDATIONS:   Recommendation to date pending progress:  Setting:  Continued physical therapy recommended at discharge.    Equipment:    None     ASSESSMENT:  Ms. Kaur admitted with above diagnosis.  She was brought to the ER with daughter with concerns for slurred speech and fall.  Patient had a fall at home when her knee gave out while standing at the bathroom sink.  Patient with a    Sit to Stand [] [] [] [] [] [x] [] [] [] [] []    Bed to Chair [] [] [] [] [] [x] [] [] [] [] []    Stand to Sit [] [] [] [] [] [x] [] [] [] [] []    BSC [] [] [] [] [] [x] [] [] [] [] []    I=Independent, Mod I=Modified Independent, S=Supervision, SBA=Standby Assistance, CGA=Contact Guard Assistance,   Min=Minimal Assistance, Mod=Moderate Assistance, Max=Maximal Assistance, Total=Total Assistance, NT=Not Tested    GAIT: I Mod I S SBA CGA Min Mod Max Total  NT x2 Comments:   Level of Assistance [] [] [] [] [] [x] [] [] [] [] []    Distance   feet X 2    DME Gait Belt and Rolling Walker    Gait Quality Decreased doug , Decreased step clearance, Decreased step length, and Decreased stance    Weightbearing Status      Stairs      I=Independent, Mod I=Modified Independent, S=Supervision, SBA=Standby Assistance, CGA=Contact Guard Assistance,   Min=Minimal Assistance, Mod=Moderate Assistance, Max=Maximal Assistance, Total=Total Assistance, NT=Not Tested    PLAN:   FREQUENCY AND DURATION: 5 times/week, Daily for duration of hospital stay or until stated goals are met, whichever comes first.    THERAPY PROGNOSIS: Good    PROBLEM LIST:   (Skilled intervention is medically necessary to address:)  Decreased ADL/Functional Activities  Decreased Activity Tolerance  Decreased AROM/PROM  Decreased Balance  Decreased Gait Ability  Decreased Strength  Decreased Transfer Abilities  Increased Pain INTERVENTIONS PLANNED:   (Benefits and precautions of physical therapy have been discussed with the patient.)  Therapeutic Activity  Therapeutic Exercise/HEP  Gait Training  Modalities  Education       TREATMENT:   TREATMENT:   Therapeutic Activity (23 Minutes): Therapeutic activity included Rolling, Supine to Sit, Scooting, Transfer Training, Ambulation on level ground, Sitting balance , Standing balance, and exercises to improve functional Activity tolerance, Balance, Mobility, Strength, and ROM.    TREATMENT GRID:  B LE LAQ and Ankle

## 2025-05-27 NOTE — PLAN OF CARE
Problem: Chronic Conditions and Co-morbidities  Goal: Patient's chronic conditions and co-morbidity symptoms are monitored and maintained or improved  Outcome: Progressing  Flowsheets (Taken 5/26/2025 2013)  Care Plan - Patient's Chronic Conditions and Co-Morbidity Symptoms are Monitored and Maintained or Improved: Monitor and assess patient's chronic conditions and comorbid symptoms for stability, deterioration, or improvement     Problem: Discharge Planning  Goal: Discharge to home or other facility with appropriate resources  Outcome: Progressing  Flowsheets (Taken 5/26/2025 2013)  Discharge to home or other facility with appropriate resources: Identify barriers to discharge with patient and caregiver     Problem: Skin/Tissue Integrity  Goal: Skin integrity remains intact  Description: 1.  Monitor for areas of redness and/or skin breakdown2.  Assess vascular access sites hourly3.  Every 4-6 hours minimum:  Change oxygen saturation probe site4.  Every 4-6 hours:  If on nasal continuous positive airway pressure, respiratory therapy assess nares and determine need for appliance change or resting period  Outcome: Progressing  Flowsheets (Taken 5/26/2025 2013)  Skin Integrity Remains Intact: Monitor for areas of redness and/or skin breakdown     Problem: ABCDS Injury Assessment  Goal: Absence of physical injury  Outcome: Progressing     Problem: Safety - Adult  Goal: Free from fall injury  Outcome: Progressing     Problem: Pain  Goal: Verbalizes/displays adequate comfort level or baseline comfort level  Outcome: Progressing

## 2025-05-27 NOTE — CARE COORDINATION
Patient's plan of care discussed during IDR rounds today. Per MD, she would like to monitor patient's kidney function for another day. CM spoke with liaison at [facility] and they are able to take patient tomorrow for admission if she is medically stable.

## 2025-05-27 NOTE — PROGRESS NOTES
Hospitalist Progress Note   Admit Date:  2025  4:38 PM   Name:  Laurence Kaur   Age:  85 y.o.  Sex:  female  :  1940   MRN:  [id number]   Room:  [facility]/    Presenting/Chief Complaint: No chief complaint on file.     Reason(s) for Admission: Acute stroke due to ischemia (HCC) [I63.9]     Hospital Course:    85 y.o. female with medical history of CHF, COPD, DVT, fibromuscular dysplasia, GERD, hyperlipidemia, hypertension, ANABELLA, osteoarthritis who presented with fall.  She has dry eyes and she was in her face with the baby soap.  When she was washing her right knee gave out and she fell down.  She was complaining of slurred speech.      Patient was admitted with acute CVA.  CT head showed small subacute infarct in the right centrum.  She was started on aspirin and Plavix and high intensity statin.  MRI of head showed acute lacunar infarct in the left centrum and moderate microvascular changes including the punctate chronic microhemorrhages.  SLP evaluated and started on diet. TTE showed normal ejection fraction and indeterminate diastolic dysfunction.  PT/OT recommended rehab.      Subjective & 24hr Events:     Patient alert and orient x 3.  Daughter and grandson at bedside.  Another daughter on speaker phone.  Patient reported desires to continue her Purewic due to joint pain and difficult movement.  Asked for gel to help with her joint pain.  Also asked if RT could help her with education to use spacer with albuterol.  Otherwise she reported some drooling with eating on right corner of her mouth.  No other concerns.  No fever or chills.      Assessment & Plan:     Acute stroke due to ischemia   CT head  shows a small subacute infarct at the right centrum semiovale with chronic periventricular and deep white matter small vessel ischemic changes; no acute intraparenchymal hemorrhage  CTA head/neck shows major carotid and vertebral arteries are patent but there are sections of arterial  WBC, UA 0-3 0 /hpf    RBC, UA 0-3 0 /hpf    BACTERIA, URINE 0 0 /hpf    Urine Culture if Indicated CULTURE NOT INDICATED BY UA RESULT      Epithelial Cells, UA 0-3 0 /hpf    Casts 0 0 /lpf    Crystals 0 0 /LPF    Mucus, UA 0 0 /lpf   CBC with Auto Differential    Collection Time: 05/27/25  6:48 AM   Result Value Ref Range    WBC 9.0 4.3 - 11.1 K/uL    RBC 4.23 4.05 - 5.2 M/uL    Hemoglobin 12.5 11.7 - 15.4 g/dL    Hematocrit 38.5 35.8 - 46.3 %    MCV 91.0 82.0 - 102.0 FL    MCH 29.6 26.1 - 32.9 PG    MCHC 32.5 31.4 - 35.0 g/dL    RDW 14.3 11.9 - 14.6 %    Platelets 218 150 - 450 K/uL    MPV 11.6 9.4 - 12.3 FL    nRBC 0.00 0.0 - 0.2 K/uL    Differential Type AUTOMATED      Neutrophils % 56.2 43.0 - 78.0 %    Lymphocytes % 31.5 13.0 - 44.0 %    Monocytes % 8.4 4.0 - 12.0 %    Eosinophils % 2.9 0.5 - 7.8 %    Basophils % 0.7 0.0 - 2.0 %    Immature Granulocytes % 0.3 0.0 - 5.0 %    Neutrophils Absolute 5.03 1.70 - 8.20 K/UL    Lymphocytes Absolute 2.82 0.50 - 4.60 K/UL    Monocytes Absolute 0.75 0.10 - 1.30 K/UL    Eosinophils Absolute 0.26 0.00 - 0.80 K/UL    Basophils Absolute 0.06 0.00 - 0.20 K/UL    Immature Granulocytes Absolute 0.03 0.0 - 0.5 K/UL   Basic Metabolic Panel w/ Reflex to MG    Collection Time: 05/27/25  6:48 AM   Result Value Ref Range    Sodium 135 (L) 136 - 145 mmol/L    Potassium 3.6 3.5 - 5.1 mmol/L    Chloride 98 98 - 107 mmol/L    CO2 22 20 - 29 mmol/L    Anion Gap 15 7 - 16 mmol/L    Glucose 113 (H) 70 - 99 mg/dL    BUN 59 (H) 8 - 23 MG/DL    Creatinine 1.61 (H) 0.60 - 1.10 MG/DL    Est, Glom Filt Rate 31 (L) >60 ml/min/1.73m2    Calcium 9.1 8.8 - 10.2 MG/DL   Phosphorus    Collection Time: 05/27/25  6:48 AM   Result Value Ref Range    Phosphorus 5.3 (H) 2.5 - 4.5 MG/DL       No results for input(s): \"COVID19\" in the last 72 hours.    Current Meds:  Current Facility-Administered Medications   Medication Dose Route Frequency    0.9 % sodium chloride infusion   IntraVENous Continuous    aspirin

## 2025-05-28 LAB
ALBUMIN SERPL-MCNC: 3 G/DL (ref 3.2–4.6)
ALBUMIN/GLOB SERPL: 1.1 (ref 1–1.9)
ALP SERPL-CCNC: 99 U/L (ref 35–104)
ALT SERPL-CCNC: 20 U/L (ref 8–45)
ANION GAP SERPL CALC-SCNC: 12 MMOL/L (ref 7–16)
AST SERPL-CCNC: 29 U/L (ref 15–37)
BASOPHILS # BLD: 0.07 K/UL (ref 0–0.2)
BASOPHILS NFR BLD: 0.9 % (ref 0–2)
BILIRUB DIRECT SERPL-MCNC: 0.2 MG/DL (ref 0–0.3)
BILIRUB SERPL-MCNC: 0.4 MG/DL (ref 0–1.2)
BUN SERPL-MCNC: 66 MG/DL (ref 8–23)
CALCIUM SERPL-MCNC: 9 MG/DL (ref 8.8–10.2)
CHLORIDE SERPL-SCNC: 100 MMOL/L (ref 98–107)
CK SERPL-CCNC: 213 U/L (ref 21–215)
CO2 SERPL-SCNC: 23 MMOL/L (ref 20–29)
CREAT SERPL-MCNC: 1.42 MG/DL (ref 0.6–1.1)
DIFFERENTIAL METHOD BLD: ABNORMAL
EOSINOPHIL # BLD: 0.28 K/UL (ref 0–0.8)
EOSINOPHIL NFR BLD: 3.6 % (ref 0.5–7.8)
ERYTHROCYTE [DISTWIDTH] IN BLOOD BY AUTOMATED COUNT: 14.1 % (ref 11.9–14.6)
GLOBULIN SER CALC-MCNC: 2.7 G/DL (ref 2.3–3.5)
GLUCOSE SERPL-MCNC: 112 MG/DL (ref 70–99)
HCT VFR BLD AUTO: 36.1 % (ref 35.8–46.3)
HGB BLD-MCNC: 12 G/DL (ref 11.7–15.4)
IMM GRANULOCYTES # BLD AUTO: 0.04 K/UL (ref 0–0.5)
IMM GRANULOCYTES NFR BLD AUTO: 0.5 % (ref 0–5)
LYMPHOCYTES # BLD: 2.54 K/UL (ref 0.5–4.6)
LYMPHOCYTES NFR BLD: 32.3 % (ref 13–44)
MCH RBC QN AUTO: 30.1 PG (ref 26.1–32.9)
MCHC RBC AUTO-ENTMCNC: 33.2 G/DL (ref 31.4–35)
MCV RBC AUTO: 90.5 FL (ref 82–102)
MONOCYTES # BLD: 0.67 K/UL (ref 0.1–1.3)
MONOCYTES NFR BLD: 8.5 % (ref 4–12)
NEUTS SEG # BLD: 4.27 K/UL (ref 1.7–8.2)
NEUTS SEG NFR BLD: 54.2 % (ref 43–78)
NRBC # BLD: 0 K/UL (ref 0–0.2)
PHOSPHATE SERPL-MCNC: 5 MG/DL (ref 2.5–4.5)
PLATELET # BLD AUTO: 210 K/UL (ref 150–450)
PMV BLD AUTO: 11.3 FL (ref 9.4–12.3)
POTASSIUM SERPL-SCNC: 3.8 MMOL/L (ref 3.5–5.1)
PROT SERPL-MCNC: 5.7 G/DL (ref 6.3–8.2)
RBC # BLD AUTO: 3.99 M/UL (ref 4.05–5.2)
SODIUM SERPL-SCNC: 135 MMOL/L (ref 136–145)
WBC # BLD AUTO: 7.9 K/UL (ref 4.3–11.1)

## 2025-05-28 PROCEDURE — 2500000003 HC RX 250 WO HCPCS: Performed by: STUDENT IN AN ORGANIZED HEALTH CARE EDUCATION/TRAINING PROGRAM

## 2025-05-28 PROCEDURE — 94760 N-INVAS EAR/PLS OXIMETRY 1: CPT

## 2025-05-28 PROCEDURE — 82550 ASSAY OF CK (CPK): CPT

## 2025-05-28 PROCEDURE — 80048 BASIC METABOLIC PNL TOTAL CA: CPT

## 2025-05-28 PROCEDURE — 92523 SPEECH SOUND LANG COMPREHEN: CPT

## 2025-05-28 PROCEDURE — 97530 THERAPEUTIC ACTIVITIES: CPT

## 2025-05-28 PROCEDURE — 94664 DEMO&/EVAL PT USE INHALER: CPT

## 2025-05-28 PROCEDURE — 85025 COMPLETE CBC W/AUTO DIFF WBC: CPT

## 2025-05-28 PROCEDURE — 84100 ASSAY OF PHOSPHORUS: CPT

## 2025-05-28 PROCEDURE — 36415 COLL VENOUS BLD VENIPUNCTURE: CPT

## 2025-05-28 PROCEDURE — 6370000000 HC RX 637 (ALT 250 FOR IP): Performed by: STUDENT IN AN ORGANIZED HEALTH CARE EDUCATION/TRAINING PROGRAM

## 2025-05-28 PROCEDURE — 92507 TX SP LANG VOICE COMM INDIV: CPT

## 2025-05-28 PROCEDURE — 1100000000 HC RM PRIVATE

## 2025-05-28 PROCEDURE — 6370000000 HC RX 637 (ALT 250 FOR IP): Performed by: FAMILY MEDICINE

## 2025-05-28 PROCEDURE — 80076 HEPATIC FUNCTION PANEL: CPT

## 2025-05-28 PROCEDURE — 6370000000 HC RX 637 (ALT 250 FOR IP): Performed by: INTERNAL MEDICINE

## 2025-05-28 PROCEDURE — 92526 ORAL FUNCTION THERAPY: CPT

## 2025-05-28 RX ORDER — TRAMADOL HYDROCHLORIDE 50 MG/1
25 TABLET ORAL EVERY 8 HOURS PRN
Qty: 3 TABLET | Refills: 0 | Status: SHIPPED | OUTPATIENT
Start: 2025-05-28 | End: 2025-05-30

## 2025-05-28 RX ORDER — ALPRAZOLAM 0.5 MG
0.5 TABLET ORAL NIGHTLY PRN
Qty: 3 TABLET | Refills: 0 | Status: SHIPPED | OUTPATIENT
Start: 2025-05-28 | End: 2025-05-31

## 2025-05-28 RX ORDER — ASPIRIN 81 MG/1
81 TABLET ORAL DAILY
Qty: 30 TABLET | Refills: 3
Start: 2025-05-28

## 2025-05-28 RX ORDER — CLOPIDOGREL BISULFATE 75 MG/1
75 TABLET ORAL DAILY
Qty: 16 TABLET | Refills: 0
Start: 2025-05-28 | End: 2025-05-30 | Stop reason: HOSPADM

## 2025-05-28 RX ORDER — HYDROCORTISONE 25 MG/G
CREAM TOPICAL 2 TIMES DAILY
Status: DISCONTINUED | OUTPATIENT
Start: 2025-05-28 | End: 2025-05-29

## 2025-05-28 RX ORDER — METAXALONE 800 MG/1
400 TABLET ORAL 2 TIMES DAILY
Qty: 60 TABLET | Refills: 0
Start: 2025-05-28

## 2025-05-28 RX ORDER — BUMETANIDE 2 MG/1
1 TABLET ORAL DAILY
Qty: 30 TABLET | Refills: 0
Start: 2025-05-28

## 2025-05-28 RX ORDER — CELECOXIB 200 MG/1
200 CAPSULE ORAL 3 TIMES DAILY
Status: ON HOLD | COMMUNITY
End: 2025-05-28

## 2025-05-28 RX ORDER — CELECOXIB 200 MG/1
200 CAPSULE ORAL DAILY
Qty: 60 CAPSULE | Refills: 3
Start: 2025-05-28

## 2025-05-28 RX ADMIN — ASPIRIN 81 MG: 81 TABLET, COATED ORAL at 09:27

## 2025-05-28 RX ADMIN — CETIRIZINE HYDROCHLORIDE 5 MG: 5 TABLET ORAL at 09:26

## 2025-05-28 RX ADMIN — SODIUM CHLORIDE, PRESERVATIVE FREE 10 ML: 5 INJECTION INTRAVENOUS at 20:38

## 2025-05-28 RX ADMIN — B-COMPLEX W/ C & FOLIC ACID TAB 1 TABLET: TAB at 09:27

## 2025-05-28 RX ADMIN — HYDROCODONE BITARTRATE AND ACETAMINOPHEN 1 TABLET: 5; 325 TABLET ORAL at 20:32

## 2025-05-28 RX ADMIN — CARBOXYMETHYLCELLULOSE SODIUM 1 DROP: 10 GEL OPHTHALMIC at 15:17

## 2025-05-28 RX ADMIN — CLOPIDOGREL BISULFATE 75 MG: 75 TABLET, FILM COATED ORAL at 09:26

## 2025-05-28 RX ADMIN — ROSUVASTATIN CALCIUM 40 MG: 20 TABLET, FILM COATED ORAL at 17:10

## 2025-05-28 RX ADMIN — TRIAMCINOLONE ACETONIDE: 1 CREAM TOPICAL at 09:25

## 2025-05-28 RX ADMIN — Medication 1 TABLET: at 09:27

## 2025-05-28 RX ADMIN — NYSTATIN 50000 UNITS: 100000 SUSPENSION ORAL at 15:16

## 2025-05-28 RX ADMIN — SERTRALINE 25 MG: 50 TABLET, FILM COATED ORAL at 09:27

## 2025-05-28 RX ADMIN — MICONAZOLE NITRATE: 20 CREAM TOPICAL at 09:24

## 2025-05-28 RX ADMIN — METOPROLOL SUCCINATE 12.5 MG: 25 TABLET, EXTENDED RELEASE ORAL at 09:26

## 2025-05-28 RX ADMIN — GUAIFENESIN 600 MG: 600 TABLET, MULTILAYER, EXTENDED RELEASE ORAL at 09:27

## 2025-05-28 RX ADMIN — CELECOXIB 200 MG: 200 CAPSULE ORAL at 09:27

## 2025-05-28 RX ADMIN — DESOXIMETASONE: 0.5 OINTMENT TOPICAL at 09:32

## 2025-05-28 RX ADMIN — TRIAMCINOLONE ACETONIDE: 1 CREAM TOPICAL at 20:33

## 2025-05-28 RX ADMIN — PANTOPRAZOLE SODIUM 40 MG: 40 TABLET, DELAYED RELEASE ORAL at 05:57

## 2025-05-28 RX ADMIN — MICONAZOLE NITRATE: 20 CREAM TOPICAL at 20:33

## 2025-05-28 RX ADMIN — CARBOXYMETHYLCELLULOSE SODIUM 1 DROP: 10 GEL OPHTHALMIC at 20:33

## 2025-05-28 RX ADMIN — CARBOXYMETHYLCELLULOSE SODIUM 1 DROP: 10 GEL OPHTHALMIC at 09:24

## 2025-05-28 RX ADMIN — NYSTATIN 50000 UNITS: 100000 SUSPENSION ORAL at 09:26

## 2025-05-28 RX ADMIN — METAXALONE 400 MG: 800 TABLET ORAL at 09:27

## 2025-05-28 RX ADMIN — SODIUM CHLORIDE, PRESERVATIVE FREE 10 ML: 5 INJECTION INTRAVENOUS at 09:27

## 2025-05-28 RX ADMIN — AMLODIPINE BESYLATE 2.5 MG: 2.5 TABLET ORAL at 09:27

## 2025-05-28 RX ADMIN — NYSTATIN 50000 UNITS: 100000 SUSPENSION ORAL at 20:32

## 2025-05-28 RX ADMIN — GUAIFENESIN 600 MG: 600 TABLET, MULTILAYER, EXTENDED RELEASE ORAL at 20:32

## 2025-05-28 RX ADMIN — HYDROCORTISONE: 25 CREAM TOPICAL at 21:16

## 2025-05-28 RX ADMIN — METAXALONE 400 MG: 800 TABLET ORAL at 20:32

## 2025-05-28 RX ADMIN — IPRATROPIUM BROMIDE 2 SPRAY: 21 SPRAY, METERED NASAL at 17:10

## 2025-05-28 RX ADMIN — NYSTATIN 50000 UNITS: 100000 SUSPENSION ORAL at 17:10

## 2025-05-28 ASSESSMENT — PAIN DESCRIPTION - LOCATION
LOCATION: ANKLE
LOCATION: ANKLE;LEG
LOCATION: ANKLE

## 2025-05-28 ASSESSMENT — PAIN DESCRIPTION - DESCRIPTORS
DESCRIPTORS: ACHING;CRAMPING
DESCRIPTORS: SORE

## 2025-05-28 ASSESSMENT — PAIN SCALES - GENERAL
PAINLEVEL_OUTOF10: 7
PAINLEVEL_OUTOF10: 2
PAINLEVEL_OUTOF10: 3
PAINLEVEL_OUTOF10: 2

## 2025-05-28 ASSESSMENT — PAIN DESCRIPTION - ORIENTATION
ORIENTATION: RIGHT;LOWER
ORIENTATION: RIGHT

## 2025-05-28 ASSESSMENT — PAIN DESCRIPTION - PAIN TYPE: TYPE: ACUTE PAIN

## 2025-05-28 NOTE — CARE COORDINATION
Patient was supposed to discharge today; however, [facility] can't accept the patient after 4pm. Patient has to have a Holter monitor applied at [facility] before being taken to the facility. Patient will be discharged tomorrow.    Maira Harris RN, BSN  [facility] Care Manager

## 2025-05-28 NOTE — PROGRESS NOTES
0.5 K/UL   Basic Metabolic Panel w/ Reflex to MG    Collection Time: 05/28/25  5:51 AM   Result Value Ref Range    Sodium 135 (L) 136 - 145 mmol/L    Potassium 3.8 3.5 - 5.1 mmol/L    Chloride 100 98 - 107 mmol/L    CO2 23 20 - 29 mmol/L    Anion Gap 12 7 - 16 mmol/L    Glucose 112 (H) 70 - 99 mg/dL    BUN 66 (H) 8 - 23 MG/DL    Creatinine 1.42 (H) 0.60 - 1.10 MG/DL    Est, Glom Filt Rate 36 (L) >60 ml/min/1.73m2    Calcium 9.0 8.8 - 10.2 MG/DL   Phosphorus    Collection Time: 05/28/25  5:51 AM   Result Value Ref Range    Phosphorus 5.0 (H) 2.5 - 4.5 MG/DL   CK    Collection Time: 05/28/25  5:51 AM   Result Value Ref Range    Total  21 - 215 U/L   Hepatic Function Panel    Collection Time: 05/28/25  5:51 AM   Result Value Ref Range    Total Protein 5.7 (L) 6.3 - 8.2 g/dL    Albumin 3.0 (L) 3.2 - 4.6 g/dL    Globulin 2.7 2.3 - 3.5 g/dL    Albumin/Globulin Ratio 1.1 1.0 - 1.9      Total Bilirubin 0.4 0.0 - 1.2 MG/DL    Bilirubin, Direct 0.2 0.0 - 0.3 MG/DL    Alk Phosphatase 99 35 - 104 U/L    AST 29 15 - 37 U/L    ALT 20 8 - 45 U/L       Recent Labs     05/27/25  0936   COVID19 Not detected       Current Meds:  Current Facility-Administered Medications   Medication Dose Route Frequency    diclofenac sodium (VOLTAREN) 1 % gel 2 g  2 g Topical 4x Daily PRN    aspirin EC tablet 81 mg  81 mg Oral Daily    celecoxib (CELEBREX) capsule 200 mg  200 mg Oral Daily    traMADol (ULTRAM) tablet 50 mg  50 mg Oral Q6H PRN    amLODIPine (NORVASC) tablet 2.5 mg  2.5 mg Oral Daily    And    [Held by provider] lisinopril (PRINIVIL;ZESTRIL) tablet 10 mg  10 mg Oral Daily    HYDROcodone-acetaminophen (NORCO) 5-325 MG per tablet 1 tablet  1 tablet Oral Q6H PRN    acetaminophen (TYLENOL) tablet 650 mg  650 mg Oral Q6H PRN    albuterol (PROVENTIL) (2.5 MG/3ML) 0.083% nebulizer solution 2.5 mg  2.5 mg Nebulization Q4H PRN    arformoterol 15 mcg-budesonide 0.25 mg neb solution   Nebulization PRN    ipratropium (ATROVENT) 0.02 %  nebulizer solution 0.5 mg  0.5 mg Nebulization PRN    sodium chloride flush 0.9 % injection 5-40 mL  5-40 mL IntraVENous 2 times per day    sodium chloride flush 0.9 % injection 5-40 mL  5-40 mL IntraVENous PRN    0.9 % sodium chloride infusion   IntraVENous PRN    ondansetron (ZOFRAN-ODT) disintegrating tablet 4 mg  4 mg Oral Q8H PRN    Or    ondansetron (ZOFRAN) injection 4 mg  4 mg IntraVENous Q6H PRN    polyethylene glycol (GLYCOLAX) packet 17 g  17 g Oral Daily PRN    clopidogrel (PLAVIX) tablet 75 mg  75 mg Oral Daily    ALPRAZolam (XANAX) tablet 0.5 mg  0.5 mg Oral Nightly PRN    [Held by provider] bumetanide (BUMEX) tablet 2 mg  2 mg Oral Daily    oyster shell calcium w/D 500-5 MG-MCG tablet 1 tablet  1 tablet Oral Daily    carboxymethylcellulose (THERATEARS) 1 % ophthalmic gel 1 drop  1 drop Both Eyes TID    miconazole (MICOTIN) 2 % cream   Topical BID    desoximetasone (TOPICORT) 0.05 % ointment (Patient Supplied)   Topical Daily    cetirizine (ZYRTEC) tablet 5 mg  5 mg Oral Daily    guaiFENesin (MUCINEX) extended release tablet 600 mg  600 mg Oral BID    ipratropium (ATROVENT) 0.03 % nasal spray 2 spray  2 spray Each Nostril Q12H    metaxalone (SKELAXIN) tablet 400 mg  400 mg Oral BID    metoprolol succinate (TOPROL XL) extended release tablet 12.5 mg  12.5 mg Oral Daily    multivitamin 1 tablet  1 tablet Oral Daily    nystatin (MYCOSTATIN) 717812 UNIT/ML suspension 50,000 Units  0.5 mL Oral 4x Daily    pantoprazole (PROTONIX) tablet 40 mg  40 mg Oral QAM AC    rimegepant sulfate TBDP 75 mg (Patient Supplied)  75 mg Oral Every Other Day    rosuvastatin (CRESTOR) tablet 40 mg  40 mg Oral QPM    sertraline (ZOLOFT) tablet 25 mg  25 mg Oral Daily    triamcinolone (KENALOG) 0.1 % cream   Topical BID       Signed:  Gerri Ghosh DO    Part of this note may have been written by using a voice dictation software.  The note has been proof read but may still contain some grammatical/other typographical errors.

## 2025-05-28 NOTE — PROGRESS NOTES
GOALS:  LTG: Patient will maintain adequate hydration/nutrition with optimum safety and efficiency of swallowing function with PO intake without overt signs or symptoms of aspiration for the highest appropriate diet level.  STG:  Patient will consume regular textures and thin liquids without overt signs or symptoms of airway compromise. Updated 2025    LTG: Patient will develop functional and intelligible speech and utilize compensatory strategies to improve communication across environments.  STG: Ongoing 2025  Patient will fill in carrier phrase/complete automatic speech tasks with 100% intelligibility given minimal cueing.  Patient will repeat words, phrases, sentences with 100% intelligibility given minimal cueing.  Patient will utilize compensatory strategies across tasks with 100% intelligibility given minimal cueing.    SPEECH LANGUAGE PATHOLOGY: DYSPHAGIA and Cognitive- Communicative Daily Note #1  and Evaluation    Acknowledge Order  I  Therapy Time  I   Charges     I  Rehab Caseload Tracker      NAME: Laurence Kaur  : 1940  MRN: [id number]    ADMISSION DATE: 2025  PRIMARY DIAGNOSIS: Acute stroke due to ischemia (HCC)    ICD-10: Treatment Diagnosis: R13.12 Dysphagia, Oropharyngeal Phase    RECOMMENDATIONS   Diet:    Regular Consistency  Thin Liquids    Medication: as tolerated   Compensatory Swallowing Strategies:   Slow rate of intake  Small bites/sips  Upright as possible for all oral intake   Therapeutic Intervention:   Patient/family education  Dysphagia treatment  Speech treatment   Patient continues to require skilled intervention:  Yes. Recommend ongoing speech therapy services during this hospitalization.     Anticipated Discharge Needs: Ongoing speech therapy is recommended at next level of care.      ASSESSMENT    Dysphagia: Patient without overt signs of aspiration with PO trials of thin liquids and solids. Patient denies difficulty with PO intake on this date.

## 2025-05-28 NOTE — DISCHARGE SUMMARY
Hospitalist Discharge Summary   Admit Date:  2025  4:38 PM   DC Note date: 2025  Name:  Laurence Kaur   Age:  85 y.o.  Sex:  female  :  1940   MRN:  [id number]   Room:  [facility]  PCP:  Yury Adamson MD    Presenting Complaint: No chief complaint on file.     Initial Admission Diagnosis: Acute stroke due to ischemia (HCC) [I63.9]     Problem List for this Hospitalization (present on admission):    Principal Problem:    Acute stroke due to ischemia (HCC)  Active Problems:    Class 3 severe obesity due to excess calories with serious comorbidity and body mass index (BMI) of 40.0 to 44.9 in adult (HCC)    Lymphedema of both lower extremities    Other hemorrhoids    Dyslipidemia    Primary hypertension    Chronic diastolic heart failure (HCC)    ANABELLA (obstructive sleep apnea)    Primary osteoarthritis of both knees    Gastroesophageal reflux disease without esophagitis    Mild persistent asthma without complication    LIVE (acute kidney injury)    Rectal bleeding  Resolved Problems:    * No resolved hospital problems. *      Hospital Course:  85 y.o. female with medical history of CHF, COPD, DVT, fibromuscular dysplasia, GERD, hyperlipidemia, hypertension, ANABELLA, osteoarthritis who presented with fall.  She has dry eyes and she was in her face with the baby soap.  When she was washing her right knee gave out and she fell down.  She was complaining of slurred speech.      Patient was admitted with acute CVA.  CT head showed small subacute infarct in the right centrum. MRI of head showed acute lacunar infarct in the left centrum and moderate microvascular changes including the punctate chronic microhemorrhages.  CTA head/neck shows major carotid and vertebral arteries are patent but there are sections of arterial fibromuscular dysplasia in both the right and left internal carotid and upper left vertebral artery; no aneurysm, stenosis or dissections present; variant collateral vessels noted at the  abnormalities; severe degenerative changes of the knee joint.  Discussed with her supportive care to rest, ice and elevate.     SLP evaluated and started on diet. TTE showed normal ejection fraction and indeterminate diastolic dysfunction.  PT/OT recommended rehab.     Pt was alert and oriented x3 on day of discharge .  Multiple family members at bedside updated.  Her dysarthria has resolved.  Denies any pain, SOB or discomfort, or melena or hematochezia. She was discharged in medically stable condition. Patient will need to follow up with her PCP in 3-5 days for repeat CBC and BMP.  Follow-up with neurology in 3-4 weeks. Return precautions given and pt verbalized understanding.       Disposition: SNF Rehab.    Patient has no signs or symptoms of active tuberculosis.  Diet: ADULT DIET; Regular; Low Fat/Low Chol/High Fiber/MALU; No Added Salt (3-4 gm)  Code Status: Full Code    Follow Ups:  Follow-up Information       Follow up With Specialties Details Why Contact Info    [facility] (Link) Assisted Living Facility, Skilled Nursing Facility, Rehabilitation   [address] [id number]  [phone]    Yury Adamson MD Internal Medicine Follow up in 1 week(s) Hospital follow up [address] [facility] [address]  [phone]      Tati Shaw APRN Neurology, Nurse Practitioner, Gerontology Follow up in 3 week(s) CVA follow up [address]  [address]  [facility] [address]  [phone]      [facility] CARDIOLOGY Cardiology Go to Go to [facility] 5/28 to pickup cardiac monitor [address]  [facility] [address]  [phone]            Future Appointments         Provider Specialty Dept Phone    6/27/2025 10:30 AM [facility] GENERAL NURSING Cardiology [phone]    10/17/2025 3:40 PM Tati Shaw APRN Neurology [phone]    10/31/2025 9:15 AM Byron Prince MD Cardiology [phone]

## 2025-05-28 NOTE — PROGRESS NOTES
ACUTE PHYSICAL THERAPY GOALS:   (Developed with and agreed upon by patient and/or caregiver.)  STG:  (1.)Ms. aKur will move from supine to sit and sit to supine  with SUPERVISION within 3 treatment day(s).    (2.)Ms. Kaur will transfer from bed to chair and chair to bed with STAND BY ASSIST using the least restrictive device within 3 treatment day(s).    (3.)Ms. Kaur will ambulate with STAND BY ASSIST for 25 feet with the least restrictive device within 3 treatment day(s).     LTG:  (1.)Ms. Kaur will move from supine to sit and sit to supine  in bed with INDEPENDENT within 7 treatment day(s).    (2.)Ms. Kaur will transfer from bed to chair and chair to bed with SUPERVISION using the least restrictive device within 7 treatment day(s).    (3.)Ms. Kaur will ambulate with SUPERVISION for 50 feet with the least restrictive device within 7 treatment day(s).  ________________________________________________________________________________________________    PHYSICAL THERAPY Daily Note and PM  (Link to Caseload Tracking: PT Visit Days : 4  Acknowledge Orders  Time In/Out  PT Charge Capture  Rehab Caseload Tracker    Laurence Kaur is a 85 y.o. female   PRIMARY DIAGNOSIS: Acute stroke due to ischemia (HCC)  Acute stroke due to ischemia (HCC) [I63.9]       Reason for Referral: Generalized Muscle Weakness (M62.81)  Difficulty in walking, Not elsewhere classified (R26.2)  History of falling (Z91.81)  Inpatient: Payor: MEDICARE / Plan: MEDICARE PART A AND B / Product Type: *No Product type* /     ASSESSMENT:     REHAB RECOMMENDATIONS:   Recommendation to date pending progress:  Setting:  Continued physical therapy recommended at discharge.    Equipment:    None     ASSESSMENT:  Ms. Kaur admitted with above diagnosis.  She was brought to the ER with daughter with concerns for slurred speech and fall.  Patient had a fall at home when her knee gave out while standing at the bathroom sink.  Patient with a

## 2025-05-29 PROBLEM — K62.5 RECTAL BLEEDING: Status: ACTIVE | Noted: 2025-05-29

## 2025-05-29 LAB
ANION GAP SERPL CALC-SCNC: 13 MMOL/L (ref 7–16)
BASOPHILS # BLD: 0.06 K/UL (ref 0–0.2)
BASOPHILS NFR BLD: 0.9 % (ref 0–2)
BUN SERPL-MCNC: 55 MG/DL (ref 8–23)
CALCIUM SERPL-MCNC: 9.1 MG/DL (ref 8.8–10.2)
CHLORIDE SERPL-SCNC: 104 MMOL/L (ref 98–107)
CO2 SERPL-SCNC: 22 MMOL/L (ref 20–29)
CREAT SERPL-MCNC: 1.05 MG/DL (ref 0.6–1.1)
DIFFERENTIAL METHOD BLD: ABNORMAL
EOSINOPHIL # BLD: 0.23 K/UL (ref 0–0.8)
EOSINOPHIL NFR BLD: 3.6 % (ref 0.5–7.8)
ERYTHROCYTE [DISTWIDTH] IN BLOOD BY AUTOMATED COUNT: 14.2 % (ref 11.9–14.6)
GLUCOSE SERPL-MCNC: 116 MG/DL (ref 70–99)
HCT VFR BLD AUTO: 34.7 % (ref 35.8–46.3)
HGB BLD-MCNC: 11.1 G/DL (ref 11.7–15.4)
IMM GRANULOCYTES # BLD AUTO: 0.02 K/UL (ref 0–0.5)
IMM GRANULOCYTES NFR BLD AUTO: 0.3 % (ref 0–5)
LYMPHOCYTES # BLD: 2.25 K/UL (ref 0.5–4.6)
LYMPHOCYTES NFR BLD: 35 % (ref 13–44)
MCH RBC QN AUTO: 29.3 PG (ref 26.1–32.9)
MCHC RBC AUTO-ENTMCNC: 32 G/DL (ref 31.4–35)
MCV RBC AUTO: 91.6 FL (ref 82–102)
MONOCYTES # BLD: 0.7 K/UL (ref 0.1–1.3)
MONOCYTES NFR BLD: 10.9 % (ref 4–12)
NEUTS SEG # BLD: 3.17 K/UL (ref 1.7–8.2)
NEUTS SEG NFR BLD: 49.3 % (ref 43–78)
NRBC # BLD: 0 K/UL (ref 0–0.2)
PLATELET # BLD AUTO: 208 K/UL (ref 150–450)
PMV BLD AUTO: 11.9 FL (ref 9.4–12.3)
POTASSIUM SERPL-SCNC: 3.6 MMOL/L (ref 3.5–5.1)
RBC # BLD AUTO: 3.79 M/UL (ref 4.05–5.2)
SODIUM SERPL-SCNC: 139 MMOL/L (ref 136–145)
WBC # BLD AUTO: 6.4 K/UL (ref 4.3–11.1)

## 2025-05-29 PROCEDURE — 6370000000 HC RX 637 (ALT 250 FOR IP): Performed by: FAMILY MEDICINE

## 2025-05-29 PROCEDURE — 97530 THERAPEUTIC ACTIVITIES: CPT

## 2025-05-29 PROCEDURE — 92507 TX SP LANG VOICE COMM INDIV: CPT

## 2025-05-29 PROCEDURE — 85025 COMPLETE CBC W/AUTO DIFF WBC: CPT

## 2025-05-29 PROCEDURE — 6370000000 HC RX 637 (ALT 250 FOR IP): Performed by: INTERNAL MEDICINE

## 2025-05-29 PROCEDURE — 36415 COLL VENOUS BLD VENIPUNCTURE: CPT

## 2025-05-29 PROCEDURE — 97535 SELF CARE MNGMENT TRAINING: CPT

## 2025-05-29 PROCEDURE — 80048 BASIC METABOLIC PNL TOTAL CA: CPT

## 2025-05-29 PROCEDURE — 99221 1ST HOSP IP/OBS SF/LOW 40: CPT | Performed by: PHYSICIAN ASSISTANT

## 2025-05-29 PROCEDURE — 1100000000 HC RM PRIVATE

## 2025-05-29 PROCEDURE — 2500000003 HC RX 250 WO HCPCS: Performed by: STUDENT IN AN ORGANIZED HEALTH CARE EDUCATION/TRAINING PROGRAM

## 2025-05-29 PROCEDURE — 92526 ORAL FUNCTION THERAPY: CPT

## 2025-05-29 PROCEDURE — 6370000000 HC RX 637 (ALT 250 FOR IP): Performed by: STUDENT IN AN ORGANIZED HEALTH CARE EDUCATION/TRAINING PROGRAM

## 2025-05-29 RX ORDER — SENNA AND DOCUSATE SODIUM 50; 8.6 MG/1; MG/1
2 TABLET, FILM COATED ORAL 2 TIMES DAILY
Status: DISCONTINUED | OUTPATIENT
Start: 2025-05-29 | End: 2025-05-30 | Stop reason: HOSPADM

## 2025-05-29 RX ORDER — HYDROCORTISONE ACETATE 25 MG/1
25 SUPPOSITORY RECTAL 2 TIMES DAILY
Status: DISCONTINUED | OUTPATIENT
Start: 2025-05-29 | End: 2025-05-30 | Stop reason: HOSPADM

## 2025-05-29 RX ORDER — POLYETHYLENE GLYCOL 3350 17 G/17G
17 POWDER, FOR SOLUTION ORAL DAILY
Status: DISCONTINUED | OUTPATIENT
Start: 2025-05-29 | End: 2025-05-30 | Stop reason: HOSPADM

## 2025-05-29 RX ADMIN — NYSTATIN 50000 UNITS: 100000 SUSPENSION ORAL at 21:11

## 2025-05-29 RX ADMIN — NYSTATIN 50000 UNITS: 100000 SUSPENSION ORAL at 12:14

## 2025-05-29 RX ADMIN — SERTRALINE 25 MG: 50 TABLET, FILM COATED ORAL at 09:21

## 2025-05-29 RX ADMIN — ROSUVASTATIN CALCIUM 40 MG: 20 TABLET, FILM COATED ORAL at 17:38

## 2025-05-29 RX ADMIN — HYDROCORTISONE: 25 CREAM TOPICAL at 09:37

## 2025-05-29 RX ADMIN — POLYETHYLENE GLYCOL 3350 17 G: 17 POWDER, FOR SOLUTION ORAL at 12:14

## 2025-05-29 RX ADMIN — B-COMPLEX W/ C & FOLIC ACID TAB 1 TABLET: TAB at 09:23

## 2025-05-29 RX ADMIN — ASPIRIN 81 MG: 81 TABLET, COATED ORAL at 09:21

## 2025-05-29 RX ADMIN — NYSTATIN 50000 UNITS: 100000 SUSPENSION ORAL at 09:23

## 2025-05-29 RX ADMIN — METAXALONE 400 MG: 800 TABLET ORAL at 09:21

## 2025-05-29 RX ADMIN — SENNOSIDES, DOCUSATE SODIUM 2 TABLET: 50; 8.6 TABLET, FILM COATED ORAL at 21:11

## 2025-05-29 RX ADMIN — MICONAZOLE NITRATE: 20 CREAM TOPICAL at 21:13

## 2025-05-29 RX ADMIN — AMLODIPINE BESYLATE 2.5 MG: 2.5 TABLET ORAL at 09:21

## 2025-05-29 RX ADMIN — GUAIFENESIN 600 MG: 600 TABLET, MULTILAYER, EXTENDED RELEASE ORAL at 21:11

## 2025-05-29 RX ADMIN — CELECOXIB 200 MG: 200 CAPSULE ORAL at 09:21

## 2025-05-29 RX ADMIN — PANTOPRAZOLE SODIUM 40 MG: 40 TABLET, DELAYED RELEASE ORAL at 05:46

## 2025-05-29 RX ADMIN — SODIUM CHLORIDE, PRESERVATIVE FREE 10 ML: 5 INJECTION INTRAVENOUS at 09:28

## 2025-05-29 RX ADMIN — GUAIFENESIN 600 MG: 600 TABLET, MULTILAYER, EXTENDED RELEASE ORAL at 09:22

## 2025-05-29 RX ADMIN — HYDROCORTISONE ACETATE 25 MG: 25 SUPPOSITORY RECTAL at 10:18

## 2025-05-29 RX ADMIN — TRIAMCINOLONE ACETONIDE: 1 CREAM TOPICAL at 21:13

## 2025-05-29 RX ADMIN — SODIUM CHLORIDE, PRESERVATIVE FREE 10 ML: 5 INJECTION INTRAVENOUS at 21:13

## 2025-05-29 RX ADMIN — METOPROLOL SUCCINATE 12.5 MG: 25 TABLET, EXTENDED RELEASE ORAL at 09:21

## 2025-05-29 RX ADMIN — Medication 1 TABLET: at 09:22

## 2025-05-29 RX ADMIN — CLOPIDOGREL BISULFATE 75 MG: 75 TABLET, FILM COATED ORAL at 09:22

## 2025-05-29 RX ADMIN — CARBOXYMETHYLCELLULOSE SODIUM 1 DROP: 10 GEL OPHTHALMIC at 21:12

## 2025-05-29 RX ADMIN — SENNOSIDES, DOCUSATE SODIUM 2 TABLET: 50; 8.6 TABLET, FILM COATED ORAL at 12:14

## 2025-05-29 RX ADMIN — NYSTATIN 50000 UNITS: 100000 SUSPENSION ORAL at 17:38

## 2025-05-29 RX ADMIN — METAXALONE 400 MG: 800 TABLET ORAL at 21:11

## 2025-05-29 RX ADMIN — CETIRIZINE HYDROCHLORIDE 5 MG: 5 TABLET ORAL at 09:23

## 2025-05-29 RX ADMIN — CARBOXYMETHYLCELLULOSE SODIUM 1 DROP: 10 GEL OPHTHALMIC at 09:28

## 2025-05-29 RX ADMIN — MICONAZOLE NITRATE: 20 CREAM TOPICAL at 09:37

## 2025-05-29 RX ADMIN — HYDROCODONE BITARTRATE AND ACETAMINOPHEN 1 TABLET: 5; 325 TABLET ORAL at 21:11

## 2025-05-29 RX ADMIN — IPRATROPIUM BROMIDE 2 SPRAY: 21 SPRAY, METERED NASAL at 05:47

## 2025-05-29 RX ADMIN — TRIAMCINOLONE ACETONIDE: 1 CREAM TOPICAL at 09:37

## 2025-05-29 ASSESSMENT — PAIN SCALES - GENERAL
PAINLEVEL_OUTOF10: 3
PAINLEVEL_OUTOF10: 0
PAINLEVEL_OUTOF10: 8

## 2025-05-29 ASSESSMENT — PAIN DESCRIPTION - ORIENTATION: ORIENTATION: RIGHT

## 2025-05-29 ASSESSMENT — PAIN DESCRIPTION - LOCATION: LOCATION: LEG

## 2025-05-29 NOTE — PROGRESS NOTES
ACUTE PHYSICAL THERAPY GOALS:   (Developed with and agreed upon by patient and/or caregiver.)  STG:  (1.)Ms. Kaur will move from supine to sit and sit to supine  with SUPERVISION within 3 treatment day(s).    (2.)Ms. Kaur will transfer from bed to chair and chair to bed with STAND BY ASSIST using the least restrictive device within 3 treatment day(s).    (3.)Ms. Kaur will ambulate with STAND BY ASSIST for 25 feet with the least restrictive device within 3 treatment day(s).     LTG:  (1.)Ms. Kaur will move from supine to sit and sit to supine  in bed with INDEPENDENT within 7 treatment day(s).    (2.)Ms. Kaur will transfer from bed to chair and chair to bed with SUPERVISION using the least restrictive device within 7 treatment day(s).    (3.)Ms. Kaur will ambulate with SUPERVISION for 50 feet with the least restrictive device within 7 treatment day(s).  ________________________________________________________________________________________________    PHYSICAL THERAPY Daily Note and AM  (Link to Caseload Tracking: PT Visit Days : 5  Acknowledge Orders  Time In/Out  PT Charge Capture  Rehab Caseload Tracker    Laurence Kaur is a 85 y.o. female   PRIMARY DIAGNOSIS: Acute stroke due to ischemia (HCC)  Acute stroke due to ischemia (HCC) [I63.9]       Reason for Referral: Generalized Muscle Weakness (M62.81)  Difficulty in walking, Not elsewhere classified (R26.2)  History of falling (Z91.81)  Inpatient: Payor: MEDICARE / Plan: MEDICARE PART A AND B / Product Type: *No Product type* /     ASSESSMENT:     REHAB RECOMMENDATIONS:   Recommendation to date pending progress:  Setting:  Continued physical therapy recommended at discharge.    Equipment:    None     ASSESSMENT:  Ms. Kaur admitted with above diagnosis.  She was brought to the ER with daughter with concerns for slurred speech and fall.  Patient had a fall at home when her knee gave out while standing at the bathroom sink.  Patient with a

## 2025-05-29 NOTE — PLAN OF CARE
Problem: Discharge Planning  Goal: Discharge to home or other facility with appropriate resources  5/29/2025 1307 by Maria Isabel Madison RN  Outcome: Progressing  5/28/2025 2312 by Misael العلي RN  Outcome: Progressing     Problem: Skin/Tissue Integrity  Goal: Skin integrity remains intact  Description: 1.  Monitor for areas of redness and/or skin breakdown2.  Assess vascular access sites hourly3.  Every 4-6 hours minimum:  Change oxygen saturation probe site4.  Every 4-6 hours:  If on nasal continuous positive airway pressure, respiratory therapy assess nares and determine need for appliance change or resting period  5/29/2025 1307 by Maria Isabel Madison RN  Outcome: Progressing  5/28/2025 2312 by Misael العلي RN  Outcome: Progressing     Problem: ABCDS Injury Assessment  Goal: Absence of physical injury  5/29/2025 1307 by Maria Isabel Madison RN  Outcome: Progressing  5/28/2025 2312 by Misael العلي RN  Outcome: Progressing     Problem: Safety - Adult  Goal: Free from fall injury  5/29/2025 1307 by Maria Isabel Madison RN  Outcome: Progressing  5/28/2025 2312 by Misael العلي RN  Outcome: Progressing

## 2025-05-29 NOTE — CARE COORDINATION
Patient's plan of care discussed during IDR rounds. Patient was going to be discharged yesterday, however, transportation could not be coordinated in order for patient to get her holter monitor and be at accepting SNF before 4pm. Today, patient has developed rectal bleeding. Work-up is pending. CM to notify liaison at [facility] as patient will  not be medically ready to discharge to [facility] today. CM will continue to follow and assist with transition to [facility] once medically stable for discharge.

## 2025-05-29 NOTE — PROGRESS NOTES
Hospitalist Progress Note   Admit Date:  2025  4:38 PM   Name:  Laurence Kaur   Age:  85 y.o.  Sex:  female  :  1940   MRN:  [id number]   Room:  [facility]/    Presenting/Chief Complaint: No chief complaint on file.     Reason(s) for Admission: Acute stroke due to ischemia (HCC) [I63.9]     Hospital Course:   85 y.o. female with medical history of CHF, COPD, DVT, fibromuscular dysplasia, GERD, hyperlipidemia, hypertension, ANABELLA, osteoarthritis who presented with fall.  She has dry eyes and she was in her face with the baby soap.  When she was washing her right knee gave out and she fell down.  She was complaining of slurred speech.      Patient was admitted with acute CVA.  CT head showed small subacute infarct in the right centrum. MRI of head showed acute lacunar infarct in the left centrum and moderate microvascular changes including the punctate chronic microhemorrhages.  CTA head/neck shows major carotid and vertebral arteries are patent but there are sections of arterial fibromuscular dysplasia in both the right and left internal carotid and upper left vertebral artery; no aneurysm, stenosis or dissections present; variant collateral vessels noted at the [place].  Discussed with neurology Dr Hoyt on  who recommended no change in management with CTA findings.  Neurology recommended DAPT for 21 days then monotherapy with aspirin indefinitely.  Continue statin at prior home dose.  Neurology recommended to screen for occult atrial fibrillation with 30-day cardiac event monitor followed by prolonged monitoring with loop recorder if necessary.  Discussed with [facility] who arranged for her to have Holter monitor as an outpatient.    Unfortunately patient developed rectal bleeding during hospital course related to her hemorrhoids.  Discussed with GI Dr. Ellis who recommended hold off on colonoscopy at this time as hemorrhoidal bleeding likely.  Limit blood thinners if possible  on admission) could be polypharmacy and pre-renal azotemia, ATN with recent contrast use.  UA 5/26 not consistent with infection  Avoid nephrotoxic meds  Cont decreased dose of Celebrex and Toradol was stopped previously  Hold home lisinopril and Bumex and can resume on discharge at lower dose  Off of miVF and encourage po hydration  CTM BMP    Chronic CHF  Lymphedema  TTE 5/24/2025 with EF 60-65% with indeterminant diastolic function.  Patient follows [facility] Dr. Prince outpatient  Monitor I's and O's  Hold home lisinopril and Bumex for now due to elevated creatinine  Follow-up with [facility] outpatient    Osteoarthritis  Continue Tylenol and tramadol as needed  Continue Celebrex daily and Skelaxin  Cont Voltaren gel prn    Anxiety  Continue home Xanax and Zoloft    Asthma  Continue home inhalers    GERD  Continue home PPI    Hypertension  Continue home Norvasc  Hold home Bumex and lisinopril due to LIVE but can resume at lower dose on discharge     Prediabetes  HA1C 6.2 this admission  Hold off on SSI at this time    ANABELLA  Continue CPAP nightly    Migraine  Continue home patient supplied Nurtec        Anticipated Discharge Arrangements:   Holding off on discharge today d/t rectal bleeding. Anticipate discharge tomorrow if bleeding improves and Hgb stable. STR when ready.    PT/OT evals ordered?  Therapy evals ordered  Diet:  ADULT DIET; Regular; 3 carb choices (45 gm/meal); Low Fat/Low Chol/High Fiber/MALU; Low Sodium (2 gm)  VTE prophylaxis: SCD's   Code status: Full Code      Non-peripheral Lines and Tubes (if present):              Telemetry (if present):  Cardiac/Telemetry Monitor On: No        Hospital Problems:  Principal Problem:    Acute stroke due to ischemia (HCC)  Active Problems:    Class 3 severe obesity due to excess calories with serious comorbidity and body mass index (BMI) of 40.0 to 44.9 in adult (HCC)    Lymphedema of both lower extremities    Other hemorrhoids

## 2025-05-29 NOTE — PROGRESS NOTES
ACUTE OCCUPATIONAL THERAPY GOALS:   (Developed with and agreed upon by patient and/or caregiver.)  1. Patient will perform lower body dressing with min assist.  2. Patient will perform upper and lower body bathing with min assist.  3. Patient will perform toilet transfers with stand by assist.  4. Patient will participate in 30 + minutes of ADL/ therapeutic exercise/therapeutic activity with min rest breaks to increase activity tolerance for self care.  5. Patient will perform standing grooming tasks x5 mins with stand by assist.  6. Patient will perform ADL functional mobility in room with stand by assist.    Goals to be achieved in 7 days.     OCCUPATIONAL THERAPY Daily Note and AM       OT Visit Days: 4  Acknowledge Orders  Time  OT Charge Capture  Rehab Caseload Tracker      Laurence Kaur is a 85 y.o. female   PRIMARY DIAGNOSIS: Acute stroke due to ischemia (HCC)  Acute stroke due to ischemia (HCC) [I63.9]       Reason for Referral: Generalized Muscle Weakness (M62.81)  Other lack of cordination (R27.8)  Inpatient: Payor: MEDICARE / Plan: MEDICARE PART A AND B / Product Type: *No Product type* /     ASSESSMENT:     REHAB RECOMMENDATIONS:   Recommendation to date pending progress:  Setting:  Post acute rehab     Equipment:    To Be Determined     ASSESSMENT:  Laurence Kaur upon arrival reclined in bed. Patient completed supine to sit edge of bed with stand by assist with extended time to complete. She needed max assist for donning socks. She then stood with min assist and completed stand pivot transfer with rolling walker contact guard assist to sit in bedside chair. Patient will continue to benefit from skilled OT services to maximize independence and safety with ADL tasks.      [facility] AM-PAC™ “6 Clicks” Daily Activity Inpatient Short Form:    AM-PAC Daily Activity - Inpatient   How much help is needed for putting on and taking off regular lower body clothing?: A Lot  How much help is

## 2025-05-29 NOTE — CONSULTS
Chart review performed. Pt with onset of rectal bleeding after initiation of DAPT. Discussed with MD. Will continue use of Anusol and closely monitor. Hgb stable. Bleeding likely more anorectal in nature. Will hold on colonoscopy for now given age. Will reconsider with continued/heavy bleeding or drop in Hgb. If needed, can also place on PPI BID. Pt and family feel comfortable with this plan.

## 2025-05-29 NOTE — PROGRESS NOTES
GOALS:  LTG: Patient will maintain adequate hydration/nutrition with optimum safety and efficiency of swallowing function with PO intake without overt signs or symptoms of aspiration for the highest appropriate diet level.  STG:  Patient will consume regular textures and thin liquids without overt signs or symptoms of airway compromise. Updated 2025    LTG: Patient will develop functional and intelligible speech and utilize compensatory strategies to improve communication across environments.  STG: Ongoing 2025  Patient will fill in carrier phrase/complete automatic speech tasks with 100% intelligibility given minimal cueing.  Patient will repeat words, phrases, sentences with 100% intelligibility given minimal cueing.  Patient will utilize compensatory strategies across tasks with 100% intelligibility given minimal cueing.    SPEECH LANGUAGE PATHOLOGY: DYSPHAGIA and Cognitive- Communicative Daily Note #2    Acknowledge Order  I  Therapy Time  I   Charges     I  Rehab Caseload Tracker      NAME: Laurence Kaur  : 1940  MRN: [id number]    ADMISSION DATE: 2025  PRIMARY DIAGNOSIS: Acute stroke due to ischemia (HCC)    ICD-10: Treatment Diagnosis: R13.12 Dysphagia, Oropharyngeal Phase    RECOMMENDATIONS   Diet:    Regular Consistency  Thin Liquids    Medication: as tolerated   Compensatory Swallowing Strategies:   Slow rate of intake  Small bites/sips  Upright as possible for all oral intake   Therapeutic Intervention:   Patient/family education  Dysphagia treatment  Speech treatment   Patient continues to require skilled intervention:  Yes. Recommend ongoing speech therapy services during this hospitalization.     Anticipated Discharge Needs: Ongoing speech therapy is recommended at next level of care.      ASSESSMENT    Dysphagia: Patient without overt signs of aspiration with PO intake. Diet advanced to regular textures with thin liquids on previous date. Patient reports diet well tolerated

## 2025-05-30 VITALS
SYSTOLIC BLOOD PRESSURE: 129 MMHG | RESPIRATION RATE: 16 BRPM | DIASTOLIC BLOOD PRESSURE: 72 MMHG | HEART RATE: 70 BPM | WEIGHT: 241.2 LBS | BODY MASS INDEX: 40.19 KG/M2 | TEMPERATURE: 97.5 F | OXYGEN SATURATION: 91 % | HEIGHT: 65 IN

## 2025-05-30 LAB
BASOPHILS # BLD: 0.06 K/UL (ref 0–0.2)
BASOPHILS NFR BLD: 1 % (ref 0–2)
DIFFERENTIAL METHOD BLD: ABNORMAL
EOSINOPHIL # BLD: 0.24 K/UL (ref 0–0.8)
EOSINOPHIL NFR BLD: 3.9 % (ref 0.5–7.8)
ERYTHROCYTE [DISTWIDTH] IN BLOOD BY AUTOMATED COUNT: 13.7 % (ref 11.9–14.6)
HCT VFR BLD AUTO: 34.7 % (ref 35.8–46.3)
HGB BLD-MCNC: 11.4 G/DL (ref 11.7–15.4)
IMM GRANULOCYTES # BLD AUTO: 0.01 K/UL (ref 0–0.5)
IMM GRANULOCYTES NFR BLD AUTO: 0.2 % (ref 0–5)
LYMPHOCYTES # BLD: 2.48 K/UL (ref 0.5–4.6)
LYMPHOCYTES NFR BLD: 40.1 % (ref 13–44)
MCH RBC QN AUTO: 29.8 PG (ref 26.1–32.9)
MCHC RBC AUTO-ENTMCNC: 32.9 G/DL (ref 31.4–35)
MCV RBC AUTO: 90.6 FL (ref 82–102)
MONOCYTES # BLD: 0.66 K/UL (ref 0.1–1.3)
MONOCYTES NFR BLD: 10.7 % (ref 4–12)
NEUTS SEG # BLD: 2.74 K/UL (ref 1.7–8.2)
NEUTS SEG NFR BLD: 44.1 % (ref 43–78)
NRBC # BLD: 0 K/UL (ref 0–0.2)
PLATELET # BLD AUTO: 204 K/UL (ref 150–450)
PMV BLD AUTO: 11.3 FL (ref 9.4–12.3)
RBC # BLD AUTO: 3.83 M/UL (ref 4.05–5.2)
WBC # BLD AUTO: 6.2 K/UL (ref 4.3–11.1)

## 2025-05-30 PROCEDURE — 6370000000 HC RX 637 (ALT 250 FOR IP): Performed by: FAMILY MEDICINE

## 2025-05-30 PROCEDURE — 6370000000 HC RX 637 (ALT 250 FOR IP): Performed by: INTERNAL MEDICINE

## 2025-05-30 PROCEDURE — 6370000000 HC RX 637 (ALT 250 FOR IP): Performed by: STUDENT IN AN ORGANIZED HEALTH CARE EDUCATION/TRAINING PROGRAM

## 2025-05-30 PROCEDURE — 2500000003 HC RX 250 WO HCPCS: Performed by: STUDENT IN AN ORGANIZED HEALTH CARE EDUCATION/TRAINING PROGRAM

## 2025-05-30 PROCEDURE — 85025 COMPLETE CBC W/AUTO DIFF WBC: CPT

## 2025-05-30 PROCEDURE — 36415 COLL VENOUS BLD VENIPUNCTURE: CPT

## 2025-05-30 RX ORDER — HYDROCORTISONE ACETATE 25 MG/1
25 SUPPOSITORY RECTAL 2 TIMES DAILY
Qty: 28 SUPPOSITORY | Refills: 0
Start: 2025-05-30 | End: 2025-06-13

## 2025-05-30 RX ORDER — SENNA AND DOCUSATE SODIUM 50; 8.6 MG/1; MG/1
2 TABLET, FILM COATED ORAL 2 TIMES DAILY
Qty: 60 TABLET | Refills: 0
Start: 2025-05-30

## 2025-05-30 RX ORDER — TRAMADOL HYDROCHLORIDE 50 MG/1
25 TABLET ORAL EVERY 8 HOURS PRN
Qty: 3 TABLET | Refills: 0 | Status: SHIPPED | OUTPATIENT
Start: 2025-05-30 | End: 2025-06-02

## 2025-05-30 RX ORDER — POLYETHYLENE GLYCOL 3350 17 G/17G
17 POWDER, FOR SOLUTION ORAL DAILY PRN
Qty: 527 G | Refills: 0
Start: 2025-05-30

## 2025-05-30 RX ADMIN — CELECOXIB 200 MG: 200 CAPSULE ORAL at 09:22

## 2025-05-30 RX ADMIN — METOPROLOL SUCCINATE 12.5 MG: 25 TABLET, EXTENDED RELEASE ORAL at 09:18

## 2025-05-30 RX ADMIN — IPRATROPIUM BROMIDE 2 SPRAY: 21 SPRAY, METERED NASAL at 05:43

## 2025-05-30 RX ADMIN — PANTOPRAZOLE SODIUM 40 MG: 40 TABLET, DELAYED RELEASE ORAL at 05:43

## 2025-05-30 RX ADMIN — ASPIRIN 81 MG: 81 TABLET, COATED ORAL at 09:20

## 2025-05-30 RX ADMIN — HYDROCORTISONE ACETATE 25 MG: 25 SUPPOSITORY RECTAL at 09:25

## 2025-05-30 RX ADMIN — AMLODIPINE BESYLATE 2.5 MG: 2.5 TABLET ORAL at 09:18

## 2025-05-30 RX ADMIN — Medication 1 TABLET: at 09:19

## 2025-05-30 RX ADMIN — SODIUM CHLORIDE, PRESERVATIVE FREE 10 ML: 5 INJECTION INTRAVENOUS at 09:24

## 2025-05-30 RX ADMIN — CETIRIZINE HYDROCHLORIDE 5 MG: 5 TABLET ORAL at 09:18

## 2025-05-30 RX ADMIN — NYSTATIN 50000 UNITS: 100000 SUSPENSION ORAL at 09:15

## 2025-05-30 RX ADMIN — METAXALONE 400 MG: 800 TABLET ORAL at 09:15

## 2025-05-30 RX ADMIN — SERTRALINE 25 MG: 50 TABLET, FILM COATED ORAL at 09:19

## 2025-05-30 RX ADMIN — GUAIFENESIN 600 MG: 600 TABLET, MULTILAYER, EXTENDED RELEASE ORAL at 09:20

## 2025-05-30 RX ADMIN — B-COMPLEX W/ C & FOLIC ACID TAB 1 TABLET: TAB at 09:18

## 2025-05-30 NOTE — PLAN OF CARE
Problem: Discharge Planning  Goal: Discharge to home or other facility with appropriate resources  5/30/2025 1109 by Ward Moses RN  Outcome: Adequate for Discharge  5/30/2025 0618 by Misael العلي RN  Outcome: Progressing     Problem: Skin/Tissue Integrity  Goal: Skin integrity remains intact  Description: 1.  Monitor for areas of redness and/or skin breakdown2.  Assess vascular access sites hourly3.  Every 4-6 hours minimum:  Change oxygen saturation probe site4.  Every 4-6 hours:  If on nasal continuous positive airway pressure, respiratory therapy assess nares and determine need for appliance change or resting period  5/30/2025 1109 by Ward Moses RN  Outcome: Adequate for Discharge  5/30/2025 0618 by Misael العلي RN  Outcome: Progressing     Problem: ABCDS Injury Assessment  Goal: Absence of physical injury  5/30/2025 1109 by Ward Moses RN  Outcome: Adequate for Discharge  5/30/2025 0618 by Misael العلي RN  Outcome: Progressing     Problem: Safety - Adult  Goal: Free from fall injury  5/30/2025 1109 by Ward Moses RN  Outcome: Adequate for Discharge  5/30/2025 0618 by Misael العلي RN  Outcome: Progressing     Problem: Pain  Goal: Verbalizes/displays adequate comfort level or baseline comfort level  5/30/2025 1109 by Ward Moses RN  Outcome: Adequate for Discharge  5/30/2025 0618 by Misael العلي RN  Outcome: Progressing

## 2025-05-30 NOTE — CARE COORDINATION
Patient discharging to [facility]. Notified [employer] that patient must be taken to [facility] first to have a holter monitor placed. Patient and nurse notified of discharge.    [facility] [address]  Report [phone]  Transport time 11:30      Maira Harris RN, BSN  [place] Care Manager

## 2025-05-30 NOTE — PROGRESS NOTES
TRANSFER - OUT REPORT:    Verbal report given to nurse Heredia on Laurence Kaur  being transferred to [facility] for routine progression of patient care       Report consisted of patient's Situation, Background, Assessment and   Recommendations(SBAR).     Information from the following report(s) Nurse Handoff Report was reviewed with the receiving nurse.                  Opportunity for questions and clarification was provided.

## 2025-06-13 ENCOUNTER — OFFICE VISIT (OUTPATIENT)
Dept: NEUROLOGY | Age: 85
End: 2025-06-13

## 2025-06-13 VITALS
DIASTOLIC BLOOD PRESSURE: 64 MMHG | HEIGHT: 65 IN | HEART RATE: 70 BPM | OXYGEN SATURATION: 100 % | BODY MASS INDEX: 40.14 KG/M2 | SYSTOLIC BLOOD PRESSURE: 140 MMHG

## 2025-06-13 DIAGNOSIS — I69.322 DYSARTHRIA AS LATE EFFECT OF CEREBROVASCULAR ACCIDENT (CVA): ICD-10-CM

## 2025-06-13 DIAGNOSIS — I77.3 FIBROMUSCULAR DYSPLASIA: ICD-10-CM

## 2025-06-13 DIAGNOSIS — I69.351 HEMIPARESIS AFFECTING RIGHT SIDE AS LATE EFFECT OF CEREBROVASCULAR ACCIDENT (HCC): ICD-10-CM

## 2025-06-13 DIAGNOSIS — Z09 HOSPITAL DISCHARGE FOLLOW-UP: Primary | ICD-10-CM

## 2025-06-13 DIAGNOSIS — R73.03 PREDIABETES: ICD-10-CM

## 2025-06-13 DIAGNOSIS — I10 HTN, GOAL BELOW 140/90: ICD-10-CM

## 2025-06-13 DIAGNOSIS — I63.81 LEFT SIDED LACUNAR INFARCTION (HCC): ICD-10-CM

## 2025-06-13 DIAGNOSIS — I89.0 LYMPHEDEMA OF BOTH LOWER EXTREMITIES: ICD-10-CM

## 2025-06-13 DIAGNOSIS — M25.561 ACUTE PAIN OF RIGHT KNEE: ICD-10-CM

## 2025-06-13 DIAGNOSIS — E66.813 CLASS 3 SEVERE OBESITY DUE TO EXCESS CALORIES WITH SERIOUS COMORBIDITY AND BODY MASS INDEX (BMI) OF 40.0 TO 44.9 IN ADULT (HCC): ICD-10-CM

## 2025-06-13 DIAGNOSIS — Z91.81 HISTORY OF RECENT FALL: ICD-10-CM

## 2025-06-13 DIAGNOSIS — G47.33 OSA ON CPAP: ICD-10-CM

## 2025-06-13 PROBLEM — I63.9 CEREBROVASCULAR ACCIDENT (HCC): Status: ACTIVE | Noted: 2025-06-02

## 2025-06-13 PROBLEM — R53.81 DEBILITY: Status: ACTIVE | Noted: 2025-06-02

## 2025-06-13 PROBLEM — I63.9 ACUTE STROKE DUE TO ISCHEMIA (HCC): Status: RESOLVED | Noted: 2025-05-23 | Resolved: 2025-06-13

## 2025-06-13 PROBLEM — K64.8 OTHER HEMORRHOIDS: Status: RESOLVED | Noted: 2020-01-16 | Resolved: 2025-06-13

## 2025-06-13 PROBLEM — F41.1 GENERALIZED ANXIETY DISORDER: Status: ACTIVE | Noted: 2025-06-03

## 2025-06-13 RX ORDER — TRAMADOL HYDROCHLORIDE 50 MG/1
25 TABLET ORAL EVERY 8 HOURS PRN
COMMUNITY
Start: 2025-06-03

## 2025-06-13 RX ORDER — BUMETANIDE 1 MG/1
2 TABLET ORAL DAILY
COMMUNITY

## 2025-06-13 RX ORDER — PANTOPRAZOLE SODIUM 40 MG/1
40 TABLET, DELAYED RELEASE ORAL DAILY
COMMUNITY

## 2025-06-13 RX ORDER — CLOPIDOGREL BISULFATE 75 MG/1
75 TABLET ORAL DAILY
COMMUNITY

## 2025-06-13 RX ORDER — WHEAT DEXTRIN 3 G/3.8 G
4 POWDER (GRAM) ORAL
COMMUNITY

## 2025-06-13 RX ORDER — ATORVASTATIN CALCIUM 80 MG/1
80 TABLET, FILM COATED ORAL DAILY
COMMUNITY

## 2025-06-13 ASSESSMENT — ENCOUNTER SYMPTOMS
ALLERGIC/IMMUNOLOGIC NEGATIVE: 1
EYES NEGATIVE: 1
GASTROINTESTINAL NEGATIVE: 1
APNEA: 1

## 2025-06-13 NOTE — PROGRESS NOTES
Continue ST.   Fibromuscular dysplasia  Continue asa and high intensity statin therapy.   HTN, goal below 140/90  Stable. Continue amlodipine, metoprolol, benazepril, bumex  Recommend keeping BP and HR log. Avoid precipitous drops in BP or drastic fluctuations.    Prediabetes  Last A1C 6.2 at goal of less than 7  Diet and exercise discussed.   ANABELLA on CPAP  She is compliant with CPAP nightly.   Lymphedema of both lower extremities  Recommend lymphedema therapy with PT after skilled rehab.   Acute pain of right knee  Followed by PCP.  Discussed conservative pain management.   History of recent fall  Discussed fall risk and prevention.   Class 3 severe obesity due to excess calories with serious comorbidity and body mass index (BMI) of 40.0 to 44.9 in adult (HCC)  Diet and exercise discussed.       Keep previously scheduled appointment for 10/17/2025 at 3:40 PM        Tati L Rice, APRN  [facility] [address], [address] [place] [address]  Phone:[phone]

## 2025-06-27 ENCOUNTER — TELEPHONE (OUTPATIENT)
Age: 85
End: 2025-06-27

## 2025-06-27 NOTE — TELEPHONE ENCOUNTER
Daughter, Mi, wanted to know how much longer patient is to wear monitor, but was told that she was to wear it 2 more days per phone. Daughter wanted to confirm that patient was to send the monitor back and when will the results be done. Daughter informed that patient is to mail the monitor back per instructions, it will take about a week maybe a few days longer to get the results with holiday coming up. Will call with results, if needed to be seen prior to next appointment, we will call her. Daughter voiced understanding//skylar

## 2025-06-27 NOTE — TELEPHONE ENCOUNTER
Patients daughter called stating she has the following issues :    Wearing a 30 day monitor  Has questions in regard to monitor    Please call and advise.

## 2025-07-15 ENCOUNTER — RESULTS FOLLOW-UP (OUTPATIENT)
Age: 85
End: 2025-07-15

## 2025-07-15 NOTE — RESULT ENCOUNTER NOTE
Please call patient.  I think this was ordered from the hospital due to a stroke.  She is my patient.  Can you let her know that it did not show any significant arrhythmias or atrial fibrillation.  There was no arrhythmias that would cause her to have a stroke.  Thank you

## 2025-07-15 NOTE — TELEPHONE ENCOUNTER
Patient called with results voiced understanding//abdonab    ----- Message from Dr. Byron Prince MD sent at 7/15/2025  4:11 PM EDT -----  Please call patient.  I think this was ordered from the hospital due to a stroke.  She is my patient.  Can you let her know that it did not show any significant arrhythmias or atrial fibrillation.  There was no arrhythmias that would cause her to have a stroke.  Thank you